# Patient Record
Sex: MALE | Race: OTHER | URBAN - METROPOLITAN AREA
[De-identification: names, ages, dates, MRNs, and addresses within clinical notes are randomized per-mention and may not be internally consistent; named-entity substitution may affect disease eponyms.]

---

## 2018-11-07 ENCOUNTER — INPATIENT (INPATIENT)
Facility: HOSPITAL | Age: 33
LOS: 6 days | Discharge: HOME | DRG: 101 | End: 2018-11-14
Attending: INTERNAL MEDICINE | Admitting: INTERNAL MEDICINE
Payer: MEDICAID

## 2018-11-07 VITALS
WEIGHT: 160.06 LBS | TEMPERATURE: 98 F | DIASTOLIC BLOOD PRESSURE: 79 MMHG | OXYGEN SATURATION: 99 % | HEART RATE: 93 BPM | RESPIRATION RATE: 16 BRPM | SYSTOLIC BLOOD PRESSURE: 123 MMHG | HEIGHT: 69 IN

## 2018-11-07 LAB
ALBUMIN SERPL ELPH-MCNC: 3.4 G/DL — LOW (ref 3.5–5)
ALP SERPL-CCNC: 35 U/L — LOW (ref 40–120)
ALT FLD-CCNC: 32 U/L DA — SIGNIFICANT CHANGE UP (ref 10–60)
ANION GAP SERPL CALC-SCNC: 8 MMOL/L — SIGNIFICANT CHANGE UP (ref 5–17)
AST SERPL-CCNC: 24 U/L — SIGNIFICANT CHANGE UP (ref 10–40)
BASOPHILS # BLD AUTO: 0.1 K/UL — SIGNIFICANT CHANGE UP (ref 0–0.2)
BASOPHILS NFR BLD AUTO: 0.7 % — SIGNIFICANT CHANGE UP (ref 0–2)
BILIRUB SERPL-MCNC: 0.4 MG/DL — SIGNIFICANT CHANGE UP (ref 0.2–1.2)
BUN SERPL-MCNC: 15 MG/DL — SIGNIFICANT CHANGE UP (ref 7–18)
CALCIUM SERPL-MCNC: 8.9 MG/DL — SIGNIFICANT CHANGE UP (ref 8.4–10.5)
CHLORIDE SERPL-SCNC: 104 MMOL/L — SIGNIFICANT CHANGE UP (ref 96–108)
CO2 SERPL-SCNC: 27 MMOL/L — SIGNIFICANT CHANGE UP (ref 22–31)
CREAT SERPL-MCNC: 1.18 MG/DL — SIGNIFICANT CHANGE UP (ref 0.5–1.3)
EOSINOPHIL # BLD AUTO: 0.5 K/UL — SIGNIFICANT CHANGE UP (ref 0–0.5)
EOSINOPHIL NFR BLD AUTO: 5.7 % — SIGNIFICANT CHANGE UP (ref 0–6)
ETHANOL SERPL-MCNC: <3 MG/DL — SIGNIFICANT CHANGE UP (ref 0–10)
GLUCOSE SERPL-MCNC: 90 MG/DL — SIGNIFICANT CHANGE UP (ref 70–99)
HCT VFR BLD CALC: 41.9 % — SIGNIFICANT CHANGE UP (ref 39–50)
HGB BLD-MCNC: 14.2 G/DL — SIGNIFICANT CHANGE UP (ref 13–17)
LYMPHOCYTES # BLD AUTO: 1.6 K/UL — SIGNIFICANT CHANGE UP (ref 1–3.3)
LYMPHOCYTES # BLD AUTO: 17.2 % — SIGNIFICANT CHANGE UP (ref 13–44)
MAGNESIUM SERPL-MCNC: 1.8 MG/DL — SIGNIFICANT CHANGE UP (ref 1.6–2.6)
MCHC RBC-ENTMCNC: 31.8 PG — SIGNIFICANT CHANGE UP (ref 27–34)
MCHC RBC-ENTMCNC: 34 GM/DL — SIGNIFICANT CHANGE UP (ref 32–36)
MCV RBC AUTO: 93.5 FL — SIGNIFICANT CHANGE UP (ref 80–100)
MONOCYTES # BLD AUTO: 0.8 K/UL — SIGNIFICANT CHANGE UP (ref 0–0.9)
MONOCYTES NFR BLD AUTO: 8.2 % — SIGNIFICANT CHANGE UP (ref 2–14)
NEUTROPHILS # BLD AUTO: 6.5 K/UL — SIGNIFICANT CHANGE UP (ref 1.8–7.4)
NEUTROPHILS NFR BLD AUTO: 68.1 % — SIGNIFICANT CHANGE UP (ref 43–77)
PLATELET # BLD AUTO: 226 K/UL — SIGNIFICANT CHANGE UP (ref 150–400)
POTASSIUM SERPL-MCNC: 4.2 MMOL/L — SIGNIFICANT CHANGE UP (ref 3.5–5.3)
POTASSIUM SERPL-SCNC: 4.2 MMOL/L — SIGNIFICANT CHANGE UP (ref 3.5–5.3)
PROT SERPL-MCNC: 7.4 G/DL — SIGNIFICANT CHANGE UP (ref 6–8.3)
RBC # BLD: 4.48 M/UL — SIGNIFICANT CHANGE UP (ref 4.2–5.8)
RBC # FLD: 11.8 % — SIGNIFICANT CHANGE UP (ref 10.3–14.5)
SODIUM SERPL-SCNC: 139 MMOL/L — SIGNIFICANT CHANGE UP (ref 135–145)
WBC # BLD: 9.5 K/UL — SIGNIFICANT CHANGE UP (ref 3.8–10.5)
WBC # FLD AUTO: 9.5 K/UL — SIGNIFICANT CHANGE UP (ref 3.8–10.5)

## 2018-11-07 PROCEDURE — 71045 X-RAY EXAM CHEST 1 VIEW: CPT | Mod: 26

## 2018-11-07 NOTE — ED PROVIDER NOTE - MEDICAL DECISION MAKING DETAILS
33 year old M Pt w/ seizure, unknown history as Pt is not providing history, seizure protocol, will check labs, CAT scan, and reassess

## 2018-11-07 NOTE — ED PROVIDER NOTE - PROGRESS NOTE DETAILS
Pt not speaking, not giving any verbal information on multiple reevaluations, but is breathing and not in any distress. When attempted to get CT, pt became extremely hostile, agitated and angry. Pt was screaming and getting up in a threatening posture, yelling and spitting. We tried multiple times to reason with him, offering food, etc and pt seemed to initially agree, but then started yelling and cursing. Pt received 4mg Versed for agitation and aggression. CT scan was performed. Will admit pt for possibly prolonged post-ictal stage. Did not give pt Haldol because of lower seizure threshold. Scribe Statement (Attending): I Tabitha Mai (Scibe) attest that this documentation has been prepared under the direction and in the presence of Christianne Clayton). Pt not speaking, not giving any verbal information on multiple reevaluations, but is breathing and not in any distress. When attempted to get CT, pt became extremely hostile, agitated and angry. Pt was screaming and getting up in a threatening posture, yelling and spitting. We tried multiple times to reason with him, offering food, etc and pt seemed to initially agree, but then started yelling and cursing sitting up from CT table and swinging his arms in aggressive manner. The staff was forced to leave the CT scan room due to risk of bodily harm.  Pt received 4mg Versed for agitation and aggression with me and multiple nurses and PCAs and security personel present.  CT scan was performed to rule out ICH. Will admit pt for altered mental status prolonged post-ictal stage. Did not give pt Haldol because it might lower seizure threshold. AMS possibly due to postictal state vs psych vs tox. after CT patient was undressed, placed on monitor placed on 1:1 for his own and staff safety. once stabilized, patient will benefit from psych evaluation while inpatient.

## 2018-11-07 NOTE — ED PROVIDER NOTE - OBJECTIVE STATEMENT
33 year old M Pt w/ PMHx of Seizures [place] x today. Pt is sleeping but arousal-able. History limited. 33 year old M Pt w/ PMHx of Seizures [place] x today. Pt is sleeping but arousal-able. History limited, patient not speaking at all. according to EMS report, [place] and have [place]. HE had a seizure 10 days prior, denies N/V

## 2018-11-07 NOTE — ED ADULT NURSE NOTE - ED STAT RN HANDOFF DETAILS 2
Received pt in bed Aox3, transported pt to room,. No nursing intervention required by documenting RN.

## 2018-11-07 NOTE — ED ADULT NURSE NOTE - OBJECTIVE STATEMENT
Pt [place] seizures [place]. Pt has a laceration on his left eyebrow.

## 2018-11-07 NOTE — ED ADULT NURSE NOTE - NSIMPLEMENTINTERV_GEN_ALL_ED
Implemented All Fall with Harm Risk Interventions:  [place] to call system. Call bell, personal items and telephone within reach. Instruct patient to call for assistance. Room bathroom lighting operational. Non-slip footwear when patient is off stretcher. Physically safe environment: no spills, clutter or unnecessary equipment. Stretcher in lowest position, wheels locked, appropriate side rails in place. Provide visual cue, wrist band, yellow gown, etc. Monitor gait and stability. Monitor for mental status changes and reorient to person, place, and time. Review medications for side effects contributing to fall risk. Reinforce activity limits and safety measures with patient and family. Provide visual clues: red socks.

## 2018-11-08 DIAGNOSIS — F19.10 OTHER PSYCHOACTIVE SUBSTANCE ABUSE, UNCOMPLICATED: ICD-10-CM

## 2018-11-08 DIAGNOSIS — Z29.9 ENCOUNTER FOR PROPHYLACTIC MEASURES, UNSPECIFIED: ICD-10-CM

## 2018-11-08 DIAGNOSIS — R41.82 ALTERED MENTAL STATUS, UNSPECIFIED: ICD-10-CM

## 2018-11-08 DIAGNOSIS — G93.40 ENCEPHALOPATHY, UNSPECIFIED: ICD-10-CM

## 2018-11-08 LAB
ALBUMIN SERPL ELPH-MCNC: 3.3 G/DL — LOW (ref 3.5–5)
ALP SERPL-CCNC: 34 U/L — LOW (ref 40–120)
ALT FLD-CCNC: 30 U/L DA — SIGNIFICANT CHANGE UP (ref 10–60)
ANION GAP SERPL CALC-SCNC: 7 MMOL/L — SIGNIFICANT CHANGE UP (ref 5–17)
APPEARANCE UR: CLEAR — SIGNIFICANT CHANGE UP
AST SERPL-CCNC: 22 U/L — SIGNIFICANT CHANGE UP (ref 10–40)
BILIRUB DIRECT SERPL-MCNC: 0.2 MG/DL — SIGNIFICANT CHANGE UP (ref 0–0.2)
BILIRUB INDIRECT FLD-MCNC: 0.3 MG/DL — SIGNIFICANT CHANGE UP (ref 0.2–1)
BILIRUB SERPL-MCNC: 0.5 MG/DL — SIGNIFICANT CHANGE UP (ref 0.2–1.2)
BILIRUB UR-MCNC: NEGATIVE — SIGNIFICANT CHANGE UP
BUN SERPL-MCNC: 15 MG/DL — SIGNIFICANT CHANGE UP (ref 7–18)
CALCIUM SERPL-MCNC: 8.9 MG/DL — SIGNIFICANT CHANGE UP (ref 8.4–10.5)
CHLORIDE SERPL-SCNC: 103 MMOL/L — SIGNIFICANT CHANGE UP (ref 96–108)
CO2 SERPL-SCNC: 31 MMOL/L — SIGNIFICANT CHANGE UP (ref 22–31)
COLOR SPEC: YELLOW — SIGNIFICANT CHANGE UP
CREAT SERPL-MCNC: 1.2 MG/DL — SIGNIFICANT CHANGE UP (ref 0.5–1.3)
DIFF PNL FLD: NEGATIVE — SIGNIFICANT CHANGE UP
GLUCOSE SERPL-MCNC: 74 MG/DL — SIGNIFICANT CHANGE UP (ref 70–99)
GLUCOSE UR QL: NEGATIVE — SIGNIFICANT CHANGE UP
KETONES UR-MCNC: NEGATIVE — SIGNIFICANT CHANGE UP
LEUKOCYTE ESTERASE UR-ACNC: NEGATIVE — SIGNIFICANT CHANGE UP
MAGNESIUM SERPL-MCNC: 1.8 MG/DL — SIGNIFICANT CHANGE UP (ref 1.6–2.6)
NITRITE UR-MCNC: NEGATIVE — SIGNIFICANT CHANGE UP
PCP SPEC-MCNC: SIGNIFICANT CHANGE UP
PH UR: 7 — SIGNIFICANT CHANGE UP (ref 5–8)
PHOSPHATE SERPL-MCNC: 4 MG/DL — SIGNIFICANT CHANGE UP (ref 2.5–4.5)
POTASSIUM SERPL-MCNC: 4.1 MMOL/L — SIGNIFICANT CHANGE UP (ref 3.5–5.3)
POTASSIUM SERPL-SCNC: 4.1 MMOL/L — SIGNIFICANT CHANGE UP (ref 3.5–5.3)
PROT SERPL-MCNC: 7.3 G/DL — SIGNIFICANT CHANGE UP (ref 6–8.3)
PROT UR-MCNC: 30 MG/DL
SODIUM SERPL-SCNC: 141 MMOL/L — SIGNIFICANT CHANGE UP (ref 135–145)
SP GR SPEC: 1.01 — SIGNIFICANT CHANGE UP (ref 1.01–1.02)
UROBILINOGEN FLD QL: 1

## 2018-11-08 PROCEDURE — 99254 IP/OBS CNSLTJ NEW/EST MOD 60: CPT

## 2018-11-08 PROCEDURE — 95819 EEG AWAKE AND ASLEEP: CPT | Mod: 26

## 2018-11-08 PROCEDURE — 93010 ELECTROCARDIOGRAM REPORT: CPT

## 2018-11-08 PROCEDURE — 99222 1ST HOSP IP/OBS MODERATE 55: CPT

## 2018-11-08 PROCEDURE — 99285 EMERGENCY DEPT VISIT HI MDM: CPT

## 2018-11-08 PROCEDURE — 70450 CT HEAD/BRAIN W/O DYE: CPT | Mod: 26

## 2018-11-08 RX ORDER — MIDAZOLAM HYDROCHLORIDE 1 MG/ML
4 INJECTION, SOLUTION INTRAMUSCULAR; INTRAVENOUS ONCE
Qty: 0 | Refills: 0 | Status: DISCONTINUED | OUTPATIENT
Start: 2018-11-08 | End: 2018-11-08

## 2018-11-08 RX ORDER — SODIUM CHLORIDE 9 MG/ML
1000 INJECTION, SOLUTION INTRAVENOUS
Qty: 0 | Refills: 0 | Status: DISCONTINUED | OUTPATIENT
Start: 2018-11-08 | End: 2018-11-12

## 2018-11-08 RX ORDER — LEVETIRACETAM 250 MG/1
1000 TABLET, FILM COATED ORAL ONCE
Qty: 0 | Refills: 0 | Status: COMPLETED | OUTPATIENT
Start: 2018-11-08 | End: 2018-11-08

## 2018-11-08 RX ORDER — THIAMINE MONONITRATE (VIT B1) 100 MG
100 TABLET ORAL DAILY
Qty: 0 | Refills: 0 | Status: COMPLETED | OUTPATIENT
Start: 2018-11-08 | End: 2018-11-12

## 2018-11-08 RX ORDER — POTASSIUM CHLORIDE 20 MEQ
40 PACKET (EA) ORAL EVERY 4 HOURS
Qty: 0 | Refills: 0 | Status: COMPLETED | OUTPATIENT
Start: 2018-11-08 | End: 2018-11-08

## 2018-11-08 RX ORDER — HEPARIN SODIUM 5000 [USP'U]/ML
5000 INJECTION INTRAVENOUS; SUBCUTANEOUS EVERY 8 HOURS
Qty: 0 | Refills: 0 | Status: DISCONTINUED | OUTPATIENT
Start: 2018-11-08 | End: 2018-11-14

## 2018-11-08 RX ORDER — FOLIC ACID 0.8 MG
1 TABLET ORAL DAILY
Qty: 0 | Refills: 0 | Status: DISCONTINUED | OUTPATIENT
Start: 2018-11-08 | End: 2018-11-14

## 2018-11-08 RX ORDER — SODIUM CHLORIDE 9 MG/ML
1000 INJECTION INTRAMUSCULAR; INTRAVENOUS; SUBCUTANEOUS ONCE
Qty: 0 | Refills: 0 | Status: COMPLETED | OUTPATIENT
Start: 2018-11-08 | End: 2018-11-08

## 2018-11-08 RX ORDER — LEVETIRACETAM 250 MG/1
1000 TABLET, FILM COATED ORAL EVERY 12 HOURS
Qty: 0 | Refills: 0 | Status: DISCONTINUED | OUTPATIENT
Start: 2018-11-08 | End: 2018-11-14

## 2018-11-08 RX ADMIN — LEVETIRACETAM 400 MILLIGRAM(S): 250 TABLET, FILM COATED ORAL at 02:00

## 2018-11-08 RX ADMIN — HEPARIN SODIUM 5000 UNIT(S): 5000 INJECTION INTRAVENOUS; SUBCUTANEOUS at 21:50

## 2018-11-08 RX ADMIN — Medication 100 MILLIGRAM(S): at 11:46

## 2018-11-08 RX ADMIN — LEVETIRACETAM 400 MILLIGRAM(S): 250 TABLET, FILM COATED ORAL at 17:18

## 2018-11-08 RX ADMIN — SODIUM CHLORIDE 100 MILLILITER(S): 9 INJECTION, SOLUTION INTRAVENOUS at 06:29

## 2018-11-08 RX ADMIN — MIDAZOLAM HYDROCHLORIDE 4 MILLIGRAM(S): 1 INJECTION, SOLUTION INTRAMUSCULAR; INTRAVENOUS at 02:00

## 2018-11-08 RX ADMIN — HEPARIN SODIUM 5000 UNIT(S): 5000 INJECTION INTRAVENOUS; SUBCUTANEOUS at 06:29

## 2018-11-08 RX ADMIN — SODIUM CHLORIDE 1000 MILLILITER(S): 9 INJECTION INTRAMUSCULAR; INTRAVENOUS; SUBCUTANEOUS at 02:30

## 2018-11-08 RX ADMIN — SODIUM CHLORIDE 100 MILLILITER(S): 9 INJECTION, SOLUTION INTRAVENOUS at 09:18

## 2018-11-08 NOTE — H&P ADULT - PROBLEM SELECTOR PLAN 1
-Patient brought in to ED after witnessed seizure/syncope by bystander  -Aggressive and combative  -U tox+ve for cannabis. BAL -ve, however seizures can still be a possibility 2/2 to Alcohol withdrawl  -Will start on CIWA protocol  -Ativan 2 mg Q4 prn  -seizure precautions  -fall precautions  -Neurology consult -Patient brought in to ED after witnessed seizure/syncope by bystander  -Aggressive and combative  -U tox+ve for cannabis. BAL -ve, however seizures can still be a possibility 2/2 to Alcohol withdrawl  -Will start on CIWA protocol  -Ativan 2 mg Q4 prn  -Banana bag, IV thiamine, folic Acid   -seizure precautions  -fall precautions  -received Kejuan luisra in Ed. Will hold on Anti-epileptics for now  -Might need an EEG. If neurological cause of AMS ruled out and patient still combative, might need psych evaluation.   -Neurology consult

## 2018-11-08 NOTE — H&P ADULT - HISTORY OF PRESENT ILLNESS
Patient is a 34 y/o M unknown PMHx was brought in by EMS after a witnessed seizure by a bystander. No description of seizure activity available. Patient remained post ictal for 5 mins as per EMS note. EMS reported patient as AAO x 3. Patient revealed he has history of seizure and had a seizure 10 days ago. In Ed patient was unresponsive. As per Ed provider patient initially unresponsive later became very aggressive and combative during CT scan, requiring extra staff to calm him down and received versed . On Resident evaluation patient was initially sleeping and woke up after 5 mins he tried to walk out of bed, patient is awake and alert but doesn't answer questions. He was eagerly eating food provided to him. ROS are unable to obtain. In Ed patient's vitals were stable. CT head showed Soft tissue swelling in the  left periorbital region and overlying the left forehead. Fracture deformities of both nasal bones, the left maxillary sinus, lateral wall the left orbit and left zygomatic arch which are probably chronic. urine tox is positive for Cannabis. BAL is <3. un known if uses Alcohol However; Alcohol with drawl seizures can occur 48 hours post Alcohol use.

## 2018-11-08 NOTE — CONSULT NOTE ADULT - SUBJECTIVE AND OBJECTIVE BOX
Patient is a 33y old  Male who presents with a chief complaint of encephalopathy/seizure (08 Nov 2018 03:56)      HPI:  Homeless; unknown history; urine positive for   PAST MEDICAL & SURGICAL HISTORY:  Seizures  No significant past surgical history      FAMILY HISTORY:  No pertinent family history in first degree relatives        Social Hx:  Nonsmoker, no drug or alcohol use    MEDICATIONS  (STANDING):  folic acid 1 milliGRAM(s) Oral daily  heparin  Injectable 5000 Unit(s) SubCutaneous every 8 hours  levETIRAcetam  IVPB 1000 milliGRAM(s) IV Intermittent every 12 hours  multivitamin/thiamine/folic acid in sodium chloride 0.9% 1000 milliLiter(s) (100 mL/Hr) IV Continuous <Continuous>  thiamine Injectable 100 milliGRAM(s) IV Push daily       Allergies    No Known Allergies    Intolerances        ROS: Pertinent positives in HPI, all other ROS were reviewed and are negative.      Vital Signs Last 24 Hrs  T(C): 36.4 (08 Nov 2018 08:55), Max: 37.1 (08 Nov 2018 07:15)  T(F): 97.6 (08 Nov 2018 08:55), Max: 98.7 (08 Nov 2018 07:15)  HR: 70 (08 Nov 2018 08:55) (60 - 93)  BP: 102/65 (08 Nov 2018 08:55) (100/60 - 123/79)  BP(mean): --  RR: 18 (08 Nov 2018 08:55) (15 - 18)  SpO2: 100% (08 Nov 2018 08:55) (97% - 100%)        Constitutional: awake and alert.  HEENT: PERRLA, EOMI,   Neck: Supple.  Respiratory: Breath sounds are clear bilaterally  Cardiovascular: S1 and S2, regular / irregular rhythm  Gastrointestinal: soft, nontender  Extremities:  no edema  Vascular: Caritid Bruit - no  Musculoskeletal: no joint swelling/tenderness, no abnormal movements  Skin: No rashes    Neurological exam:  HF: Stuporous resists efforts; CHRISTAL; OS conjunctival and eyelid orbital swelling forehead abrasion. EOM full. face symmetrical. Moves alll 4 extremities . DTR symmetrical; plantar flexor. neck supple  CCoord:  Not tested  Gait/Balance: Cannot test        Labs:                        14.2   9.5   )-----------( 226      ( 07 Nov 2018 21:13 )             41.9     11-08    141  |  103  |  15  ----------------------------<  74  4.1   |  31  |  1.20    Ca    8.9      08 Nov 2018 05:46  Phos  4.0     11-08  Mg     1.8     11-08    TPro  7.3  /  Alb  3.3<L>  /  TBili  0.5  /  DBili  0.2  /  AST  22  /  ALT  30  /  AlkPhos  34<L>  11-08            Radiology report:  - CT head:    < from: CT Head No Cont (11.08.18 @ 01:32) >  EXAM:  CT BRAIN                            PROCEDURE DATE:  11/08/2018          INTERPRETATION:  CLINICAL INFORMATION: Status post seizure.    TECHNIQUE:  Axial CT images were acquired through the head.  Intravenous contrast: None  Two-dimensional reformats were generated.    COMPARISON STUDY: None    FINDINGS:   There is no CT evidence of acute intracranial hemorrhage,  mass effect,   midline shift or acute territorial infarct. The basal cisterns are patent.    The ventricles and sulci are normal in size.      There is mild mucosal thickening in the right maxillary sinus.    The mastoid air cells and middle ear cavities are clear.    There is soft tissue swelling in the left periorbital region and   overlying the left forehead.  There is no calvarial or skull base   fracture.    There are fracture deformities in both nasal bones, in the posterior   lateral wall the left maxillary sinus, in the lateral wall the left orbit   and in the left zygomatic arch without overlying soft tissue swelling or   soft tissue gas which are probably chronic.    IMPRESSION:   Soft tissue swelling in the  left periorbital region and overlying the   left forehead.  Nno CT evidence of acute intracranial hemorrhage, mass   effect, acute territorial infarct or calvarial fracture.  Follow-up MRI   can be obtained as clinically warranted to assess for seizure focus.    Fracture deformities of both nasal bones, the left maxillary sinus,   lateral wall the left orbit and left zygomatic arch which are probably   chronic.                MICHAEL MEYERS M.D.,ATTENDING RADIOLOGIST  This document has been electronically signed. Nov 8 2018  2:24AM

## 2018-11-08 NOTE — CONSULT NOTE ADULT - ASSESSMENT
Stupor: Unlikely encephalitis/meningitis; SAH based on the physical examination and CT head; Withdrawal, post ictal encepahalopathy, head injury or other pathology may need to an investigated. Plan EEG, repeat CT or MRI

## 2018-11-08 NOTE — EEG REPORT - NS EEG TEXT BOX
[facility]  Report of Routine EEG with Video      [facility]: [address], [address] [place] [address], Phone: [phone]  [facility]: [address] [place] [address], Phone: [phone]  Office: [address] [facility], [place] [address], Phone: [phone]    Patient Name: VAN WOMACK    Age: 33 year  : 1985  Patient ID: -, MRN #: -, Location: -  Referring Physician: DR LLANOS  EEG #:     Study Date: 2018		    Technical Information:					  On Instrument: -  Placement and Labeling of Electrodes:  The EEG was performed utilizing 20 channels referential EEG connections (coronal over temporal over parasagittal montage) using all standard 10-20 electrode placements with EKG.  Recording was at a sampling rate of 256 samples per second per channel.  Time synchronized digital video recording was done simultaneously with EEG recording.  A low light infrared camera was used for low light recording.  Ramon and seizure detection algorithms were utilized.    History:  -33 years old evaluated for seizures  -    Medication	  Home Medications: Depakote:  (2018 02:23) 	    Study Interpretation:    FINDINGS:  The background was continuous, spontaneously variable and reactive.   During wakefulness, poorly organized 7 Hz broadly distributed posterior dominant rhythm.     Background Slowing:  Background predominantly consisted of theta, delta and faster activities, superimposed high amplitude 2-2.5 Hz activity and diffuse 4 Hz activity.    Focal Slowing:   None were present.    Sleep Background:  Stage II sleep transients were not recorded.    Other Non-Epileptiform Findings:  None were present.    Interictal Epileptiform Activity:   None were present.    Events:  Clinical events: None recorded.  Seizures: None recorded.    Activation Procedures:   Hyperventilation was not performed.    Photic stimulation was performed and did not elicit any abnormalities.      Artifacts:  Intermittent myogenic and movement artifacts were noted.    ECG:  The heart rate on single channel ECG was predominantly between 60-70 BPM.      EEG Summary/Classification:   Abnormal EEG in the awake / drowsy state(s).  Highly rhythmic frontally predominant background slowing without evolution.    EEG Impression/Clinical Correlate:  Abnormal EEG study.  Moderate nonspecific diffuse or multifocal cerebral dysfunction.   Highly rhythmic frontally predominant background slowing without evolution.  Recommend repeat interval study.      Melyssa Mehta MD  Epilepsy Fellow, [facility] [place]      ________________________	  Germain Loo M.D.  Attending Physician, [facility]  Report of Routine EEG with Video      [facility]: [address], [address] [place], [place] [address], Phone: [phone]  [facility]: [address] [place] [address], Phone: [phone]  Office: 1 [facility], [address], [place] [address], Phone: [phone]    Patient Name: VAN WOMACK    Age: 33 year  : 1985  Patient ID: -, MRN #: -, Location: -  Referring Physician: DR LLANOS  EEG #:     Study Date: 2018		    Technical Information:					  On Instrument: -  Placement and Labeling of Electrodes:  The EEG was performed utilizing 20 channels referential EEG connections (coronal over temporal over parasagittal montage) using all standard 10-20 electrode placements with EKG.  Recording was at a sampling rate of 256 samples per second per channel.  Time synchronized digital video recording was done simultaneously with EEG recording.  A low light infrared camera was used for low light recording.  Ramon and seizure detection algorithms were utilized.    History:  -33 years old evaluated for seizures  -    Medication	  Home Medications: Depakote:  (2018 02:23) 	    Study Interpretation:    FINDINGS:  The background was continuous, spontaneously variable and reactive.   During wakefulness, poorly organized 7 Hz broadly distributed posterior dominant rhythm.     Background Slowing:  Background predominantly consisted of theta, delta and faster activities, superimposed high amplitude 2-2.5 Hz activity and diffuse 4 Hz activity.    Focal Slowing:   None were present.    Sleep Background:  Stage II sleep transients were not recorded.    Other Non-Epileptiform Findings:  None were present.    Interictal Epileptiform Activity:   None were present.    Events:  Clinical events: None recorded.  Seizures: None recorded.    Activation Procedures:   Hyperventilation was not performed.    Photic stimulation was performed and did not elicit any abnormalities.      Artifacts:  Intermittent myogenic and movement artifacts were noted.    ECG:  The heart rate on single channel ECG was predominantly between 60-70 BPM.      EEG Summary/Classification:  Abnormal EEG in the awake / drowsy state(s).  Atypical looking highly rhythmic bifrontally predominant generalized/multifocal background slowing without evolution.    EEG Impression/Clinical Correlate:  Abnormal EEG study.  Moderate diffuse or multifocal cerebral dysfunction. Atypical looking EEG, due to fairly rhythmic component, recommend repeat interval study if symptoms not improving.    Melyssa Mehta MD  Epilepsy Fellow, [facility]      ________________________	  Germain Loo M.D.  Attending Physician, [facility]

## 2018-11-08 NOTE — H&P ADULT - PROBLEM SELECTOR PLAN 3
RISK                                                          Points  [] Previous VTE                                           3  [] Thrombophilia                                        2  [] Lower limb paralysis                              2   [] Current Cancer                                       2   [x] Immobilization > 24 hrs                        1  [] ICU/CCU stay > 24 hours                       1  [] Age > 60                                                   1    DVT prophylaxis with heparin sc

## 2018-11-08 NOTE — H&P ADULT - ASSESSMENT
Patient is a 34 y/o M unknown PMHx was brought in by EMS after a witnessed seizure by a bystander. No description of seizure activity available. Patient remained post ictal for 5 mins as per EMS note. EMS reported patient as AAO x 3. Patient revealed he has history of seizure and had a seizure 10 days ago. In Ed patient was unresponsive. As per Ed provider patient initially unresponsive later became very aggressive and combative during CT scan, requiring extra staff to calm him down and received versed.

## 2018-11-08 NOTE — H&P ADULT - NSHPPHYSICALEXAM_GEN_ALL_CORE
________________________________________________  PHYSICAL EXAM:  GENERAL: NAD, poorly groomed   HEENT: Normocephalic; left raccoon eye, conjunctivae and sclerae clear; dry mucous membranes; left forehead abrasion currently healing   NECK : supple  CHEST/LUNG: Clear to auscultation bilaterally with good air entry   HEART: S1 S2  regular; no murmurs, gallops or rubs  ABDOMEN: Soft, Nontender, Nondistended; Bowel sounds present  EXTREMITIES: no cyanosis; no edema; no calf tenderness  SKIN: warm and dry; no rash  NERVOUS SYSTEM:  Awake and alert;

## 2018-11-08 NOTE — PATIENT PROFILE ADULT - NSPROCHRONICPAIN_GEN_A_NUR
Pt rescheduled for 1:30 injection appt today.  Spoke with Dr. Rios who gives VO for pt to continue on Invega Sustenna 234 mg Q 4 weeks.  Ok to give injection today.  With regard to provider appt, will text Dr. Rios if pt should be seen today or if reports sx are stable, okay to wait until next opening.     unable to assess; pt doesn't want to respond

## 2018-11-08 NOTE — ED ADULT NURSE REASSESSMENT NOTE - NS ED NURSE REASSESS COMMENT FT1
Pt sleeping but arousable. pt on 1:1 direct observation, mediated per order. Pt given food, pt passed urine, urine sent to lab. pt not in distress.

## 2018-11-09 LAB
ANION GAP SERPL CALC-SCNC: 7 MMOL/L — SIGNIFICANT CHANGE UP (ref 5–17)
BUN SERPL-MCNC: 19 MG/DL — HIGH (ref 7–18)
CALCIUM SERPL-MCNC: 9.3 MG/DL — SIGNIFICANT CHANGE UP (ref 8.4–10.5)
CHLORIDE SERPL-SCNC: 104 MMOL/L — SIGNIFICANT CHANGE UP (ref 96–108)
CO2 SERPL-SCNC: 25 MMOL/L — SIGNIFICANT CHANGE UP (ref 22–31)
CREAT SERPL-MCNC: 1.12 MG/DL — SIGNIFICANT CHANGE UP (ref 0.5–1.3)
GLUCOSE SERPL-MCNC: 92 MG/DL — SIGNIFICANT CHANGE UP (ref 70–99)
HCT VFR BLD CALC: 43.8 % — SIGNIFICANT CHANGE UP (ref 39–50)
HGB BLD-MCNC: 14.6 G/DL — SIGNIFICANT CHANGE UP (ref 13–17)
MAGNESIUM SERPL-MCNC: 2 MG/DL — SIGNIFICANT CHANGE UP (ref 1.6–2.6)
MCHC RBC-ENTMCNC: 31.5 PG — SIGNIFICANT CHANGE UP (ref 27–34)
MCHC RBC-ENTMCNC: 33.4 GM/DL — SIGNIFICANT CHANGE UP (ref 32–36)
MCV RBC AUTO: 94.4 FL — SIGNIFICANT CHANGE UP (ref 80–100)
PHOSPHATE SERPL-MCNC: 4.2 MG/DL — SIGNIFICANT CHANGE UP (ref 2.5–4.5)
PLATELET # BLD AUTO: 224 K/UL — SIGNIFICANT CHANGE UP (ref 150–400)
POTASSIUM SERPL-MCNC: 3.9 MMOL/L — SIGNIFICANT CHANGE UP (ref 3.5–5.3)
POTASSIUM SERPL-SCNC: 3.9 MMOL/L — SIGNIFICANT CHANGE UP (ref 3.5–5.3)
RBC # BLD: 4.64 M/UL — SIGNIFICANT CHANGE UP (ref 4.2–5.8)
RBC # FLD: 11.4 % — SIGNIFICANT CHANGE UP (ref 10.3–14.5)
SODIUM SERPL-SCNC: 136 MMOL/L — SIGNIFICANT CHANGE UP (ref 135–145)
WBC # BLD: 8.5 K/UL — SIGNIFICANT CHANGE UP (ref 3.8–10.5)
WBC # FLD AUTO: 8.5 K/UL — SIGNIFICANT CHANGE UP (ref 3.8–10.5)

## 2018-11-09 PROCEDURE — 99232 SBSQ HOSP IP/OBS MODERATE 35: CPT

## 2018-11-09 RX ADMIN — HEPARIN SODIUM 5000 UNIT(S): 5000 INJECTION INTRAVENOUS; SUBCUTANEOUS at 21:24

## 2018-11-09 RX ADMIN — LEVETIRACETAM 400 MILLIGRAM(S): 250 TABLET, FILM COATED ORAL at 05:11

## 2018-11-09 RX ADMIN — Medication 100 MILLIGRAM(S): at 12:20

## 2018-11-09 RX ADMIN — LEVETIRACETAM 400 MILLIGRAM(S): 250 TABLET, FILM COATED ORAL at 17:18

## 2018-11-09 RX ADMIN — HEPARIN SODIUM 5000 UNIT(S): 5000 INJECTION INTRAVENOUS; SUBCUTANEOUS at 05:11

## 2018-11-09 RX ADMIN — SODIUM CHLORIDE 100 MILLILITER(S): 9 INJECTION, SOLUTION INTRAVENOUS at 09:12

## 2018-11-09 NOTE — PROGRESS NOTE ADULT - SUBJECTIVE AND OBJECTIVE BOX
PGY1 Note discussed with supervising resident and primary attending.    Patient is a 33y old  Male who presents with a chief complaint of encephalopathy/seizure (2018 10:16)      INTERVAL HPI/OVERNIGHT EVENTS: Neurology recommended MRI after abnormal EEG study. We are doing Xray skull to rule out any metallic object.  is working on placement to shelter since pt is homeless.    MEDICATIONS  (STANDING):  folic acid 1 milliGRAM(s) Oral daily  heparin  Injectable 5000 Unit(s) SubCutaneous every 8 hours  levETIRAcetam  IVPB 1000 milliGRAM(s) IV Intermittent every 12 hours  multivitamin/thiamine/folic acid in sodium chloride 0.9% 1000 milliLiter(s) (100 mL/Hr) IV Continuous <Continuous>  thiamine Injectable 100 milliGRAM(s) IV Push daily    MEDICATIONS  (PRN):  LORazepam   Injectable 2 milliGRAM(s) IntraMuscular every 4 hours PRN CIWA >7      Allergies    No Known Allergies    Intolerances        REVIEW OF SYSTEMS:  CONSTITUTIONAL: No fever, weight loss, or fatigue  RESPIRATORY: No cough, wheezing, chills or hemoptysis; No shortness of breath  CARDIOVASCULAR: No chest pain, palpitations, dizziness, or leg swelling  GASTROINTESTINAL: No abdominal or epigastric pain. No nausea, vomiting, or hematemesis; No diarrhea or constipation. No melena or hematochezia.  NEUROLOGICAL: No headaches, memory loss, loss of strength, numbness, or tremors  SKIN: No itching, burning, rashes, or lesions     Vital Signs Last 24 Hrs  T(C): 36.8 (2018 04:56), Max: 36.8 (2018 04:56)  T(F): 98.2 (2018 04:56), Max: 98.2 (2018 04:56)  HR: 66 (2018 04:56) (61 - 66)  BP: 112/68 (2018 04:56) (112/68 - 115/73)  BP(mean): --  RR: 16 (2018 04:56) (16 - 18)  SpO2: 100% (2018 04:56) (100% - 100%)    PHYSICAL EXAM:  GENERAL: NAD, well-groomed, well-developed  HEAD:  Atraumatic, Normocephalic  EYES: EOMI, PERRLA, conjunctiva and sclera clear  NECK: Supple, No JVD, Normal thyroid  CHEST/LUNG: Clear to percussion bilaterally; No rales, rhonchi, wheezing, or rubs  HEART: Regular rate and rhythm; No murmurs, rubs, or gallops  ABDOMEN: Soft, Nontender, Nondistended; Bowel sounds present  NERVOUS SYSTEM:  Alert & Oriented X3, Good concentration; Motor Strength 5/5 B/L   EXTREMITIES:  2+ Peripheral Pulses, No clubbing, cyanosis, or edema  SKIN;    LABS:                        14.6   8.5   )-----------( 224      ( 2018 07:09 )             43.8         136  |  104  |  19<H>  ----------------------------<  92  3.9   |  25  |  1.12    Ca    9.3      2018 07:09  Phos  4.2       Mg     2.0         TPro  7.3  /  Alb  3.3<L>  /  TBili  0.5  /  DBili  0.2  /  AST  22  /  ALT  30  /  AlkPhos  34<L>  -08      Urinalysis Basic - ( 2018 02:50 )    Color: Yellow / Appearance: Clear / S.010 / pH: x  Gluc: x / Ketone: Negative  / Bili: Negative / Urobili: 1   Blood: x / Protein: 30 mg/dL / Nitrite: Negative   Leuk Esterase: Negative / RBC: 0-2 /HPF / WBC 0-2 /HPF   Sq Epi: x / Non Sq Epi: Few /HPF / Bacteria: Moderate /HPF      CAPILLARY BLOOD GLUCOSE          RADIOLOGY & ADDITIONAL TESTS:    Imaging Personally Reviewed:  [ ] YES  [ ] NO    Consultant(s) Notes Reviewed:  [ ] YES  [ ] NO

## 2018-11-09 NOTE — CHART NOTE - NSCHARTNOTEFT_GEN_A_CORE
11: 19: Per treatment team, patient off CIWA.  LCSW attempted to meet patient for discharge planning.  Patient asleep, not responsive to LCSW.  Patient remains on 1:1 for risk of harm to self/ agitation.

## 2018-11-09 NOTE — PROGRESS NOTE ADULT - ASSESSMENT
Likely withdrawal seizures and encephalopathy; superficial scalp bruises and ecchymoses and sub-conjunctival hemorrhages without visual impairment; social concern - homelessness and alcohol habit; recommend social work consultation

## 2018-11-09 NOTE — PROGRESS NOTE ADULT - SUBJECTIVE AND OBJECTIVE BOX
INTERVAL HPI/OVERNIGHT EVENTS: No recurrence of seizures; quiet cooperative and pleasant    HEALTH ISSUES - PROBLEM Dx:  Prophylactic measure: Prophylactic measure  Substance abuse: Substance abuse  Encephalopathy: Encephalopathy          MEDICATIONS  (STANDING):  folic acid 1 milliGRAM(s) Oral daily  heparin  Injectable 5000 Unit(s) SubCutaneous every 8 hours  levETIRAcetam  IVPB 1000 milliGRAM(s) IV Intermittent every 12 hours  multivitamin/thiamine/folic acid in sodium chloride 0.9% 1000 milliLiter(s) (100 mL/Hr) IV Continuous <Continuous>  thiamine Injectable 100 milliGRAM(s) IV Push daily    MEDICATIONS  (PRN):  LORazepam   Injectable 2 milliGRAM(s) IntraMuscular every 4 hours PRN CIWA >7      Allergies    No Known Allergies    Intolerances        REVIEW OF SYSTEMS    REVIEW OF SYSTEMS:    CONSTITUTIONAL: No weakness, fatigue, malaise, fevers or chills, no weight change, appetite change  EYES: No visual changes; No double vision,  No vertigo, eye pain  Ears: no otalgia, no otorhea, no hearing loss, tinnitus  Nose: no epistaxis, rhinorrhea, post-discharge, sinus pressure  Throat: no throat pain, no oral lesions, tooth pain   NECK: No pain or stiffness  RESPIRATORY: No cough (productive or dry), wheezing, hemoptysis; No shortness of breath, orthnopnea, PND, BROWN, snoring,  CARDIOVASCULAR: No chest pain or palpitations, no leg edema, no claudication    GASTROINTESTINAL: No abdominal or epigastric pain. No nausea, vomiting, or hematemesis; No diarrhea or constipation. No melena or hematochezia.  GENITOURINARY: No dysuria, frequency, urgency or hematuria, no pelvic pain, urinary incontinence, urgency  Muscloskeletal: no joints or muscle pain, no swelling in joints or muscles  NEUROLOGICAL: No numbness or weakness, headache, memory loss, seizures, dizziness, vertigo, syncope, ataxia  SKIN: No pruritis, rashes, lesions or new moles  Psych: No anxiety, sadness, insomnia, suicide thoughts  Endocrine: No Heat or Cold intolerance, polydipsia, polyphagia  Heme/Lymph: no LN enlargement, no easy bruising or bleeding         Vital Signs Last 24 Hrs  T(C): 36.8 (2018 04:56), Max: 36.8 (2018 04:56)  T(F): 98.2 (2018 04:56), Max: 98.2 (2018 04:56)  HR: 66 (2018 04:56) (61 - 66)  BP: 112/68 (2018 04:56) (112/68 - 115/73)  BP(mean): --  RR: 16 (2018 04:56) (16 - 18)  SpO2: 100% (2018 04:56) (100% - 100%)    PHYSICAL EXAM:    Alert, awake, oriented in time, place, and person with normal immediate and 5 minute recall, fluent speech with normal naming, repetition and comprehension, Pupils are equal and reactive to light and accomodation. Visual fields are full by confrontation testing. Funduscopy reveals normal discs and retina. Extraocular movements are normal without nystagmus. Facial sensations, jaw strength, facial movements, hearing, palate, neck, shoulder and tongue are normal and symmetrical. Sensation for touch, pin, temperature and vibration, position sense are normal and symmetrical in the extremities and /or the trunk. Tone is normal in the extremities. Strength is 5/5. Muscle spindle reflexes (DTR) are normal. Plantar responses are flexor. Finger-to-nose and heel-to-shin are normal. Romberg sign is normal; Heel-walking and toe-walking are normal. Tandem gait is normal.      LABS:                        14.6   8.5   )-----------( 224      ( 2018 07:09 )             43.8     11    136  |  104  |  19<H>  ----------------------------<  92  3.9   |  25  |  1.12    Ca    9.3      2018 07:09  Phos  4.2     11-  Mg     2.0     -    TPro  7.3  /  Alb  3.3<L>  /  TBili  0.5  /  DBili  0.2  /  AST  22  /  ALT  30  /  AlkPhos  34<L>  11-08      Urinalysis Basic - ( 2018 02:50 )    Color: Yellow / Appearance: Clear / S.010 / pH: x  Gluc: x / Ketone: Negative  / Bili: Negative / Urobili: 1   Blood: x / Protein: 30 mg/dL / Nitrite: Negative   Leuk Esterase: Negative / RBC: 0-2 /HPF / WBC 0-2 /HPF   Sq Epi: x / Non Sq Epi: Few /HPF / Bacteria: Moderate /HPF        RADIOLOGY & ADDITIONAL TESTS:  < from: CT Head No Cont (18 @ 01:32) >      PROCEDURE DATE:  2018          INTERPRETATION:  CLINICAL INFORMATION: Status post seizure.    TECHNIQUE:  Axial CT images were acquired through the head.  Intravenous contrast: None  Two-dimensional reformats were generated.    COMPARISON STUDY: None    FINDINGS:   There is no CT evidence of acute intracranial hemorrhage,  mass effect,   midline shift or acute territorial infarct. The basal cisterns are patent.    The ventricles and sulci are normal in size.      There is mild mucosal thickening in the right maxillary sinus.    The mastoid air cells and middle ear cavities are clear.    There is soft tissue swelling in the left periorbital region and   overlying the left forehead.  There is no calvarial or skull base   fracture.    There are fracture deformities in both nasal bones, in the posterior   lateral wall the left maxillary sinus, in the lateral wall the left orbit   and in the left zygomatic arch without overlying soft tissue swelling or   soft tissue gas which are probably chronic.    IMPRESSION:   Soft tissue swelling in the  left periorbital region and overlying the   left forehead.  Nno CT evidence of acute intracranial hemorrhage, mass   effect, acute territorial infarct or calvarial fracture.  Follow-up MRI   can be obtained as clinically warranted to assess for seizure focus.    Fracture deformities of both nasal bones, the left maxillary sinus,   lateral wall the left orbit and left zygomatic arch which are probably   chronic.        MICHAEL MEYERS M.D.,ATTENDING RADIOLOGIST    EEG  Generalized symmetrical slowing There are no epileptiform discharges: consistent with diffuse encephalopathy

## 2018-11-09 NOTE — PROGRESS NOTE ADULT - ASSESSMENT
Patient is a 32 y/o M unknown PMHx was brought in by EMS after a witnessed seizure by a bystander. No description of seizure activity available. Patient remained post ictal for 5 mins as per EMS note. EMS reported patient as AAO x 3. Patient revealed he has history of seizure and had a seizure 10 days ago. In Ed patient was unresponsive. As per Ed provider patient initially unresponsive later became very aggressive and combative during CT scan, requiring extra staff to calm him down and received versed.

## 2018-11-09 NOTE — PROGRESS NOTE ADULT - PROBLEM SELECTOR PLAN 1
-Patient brought in to ED after witnessed seizure/syncope by bystander  -Aggressive and combative  -U tox+ve for cannabis. BAL -ve, however seizures can still be a possibility 2/2 to Alcohol withdrawl  -on CIWA protocol  -Ativan 2 mg Q4 prn  -Banana bag, IV thiamine, folic Acid day 2  -seizure precautions  -fall precautions  -received Keppra in Ed.   -EEG showed abnormal waveform pattren. Neurology recommended MRI for brain.

## 2018-11-10 RX ADMIN — HEPARIN SODIUM 5000 UNIT(S): 5000 INJECTION INTRAVENOUS; SUBCUTANEOUS at 05:10

## 2018-11-10 RX ADMIN — Medication 100 MILLIGRAM(S): at 12:06

## 2018-11-10 RX ADMIN — LEVETIRACETAM 400 MILLIGRAM(S): 250 TABLET, FILM COATED ORAL at 17:14

## 2018-11-10 RX ADMIN — HEPARIN SODIUM 5000 UNIT(S): 5000 INJECTION INTRAVENOUS; SUBCUTANEOUS at 14:50

## 2018-11-10 RX ADMIN — LEVETIRACETAM 400 MILLIGRAM(S): 250 TABLET, FILM COATED ORAL at 05:09

## 2018-11-10 RX ADMIN — HEPARIN SODIUM 5000 UNIT(S): 5000 INJECTION INTRAVENOUS; SUBCUTANEOUS at 21:21

## 2018-11-10 RX ADMIN — Medication 1 MILLIGRAM(S): at 12:06

## 2018-11-10 NOTE — PROGRESS NOTE ADULT - SUBJECTIVE AND OBJECTIVE BOX
VAN WOMACK  MR# [id number]  33yMale        Patient is a 33y old  Male who presents with a chief complaint of encephalopathy/seizure (09 Nov 2018 15:44)      INTERVAL HPI/OVERNIGHT EVENTS:  Patient seen and examined at bedside. Remains on 1:1, awaiting on MRI of the brain once stable. No notations of chest pain, palpitation, SOB, orthopnea, nausea, vomiting or abdominal pain.    ALLERGIES  No Known Allergies      MEDICATIONS  folic acid 1 milliGRAM(s) Oral daily  heparin  Injectable 5000 Unit(s) SubCutaneous every 8 hours  levETIRAcetam  IVPB 1000 milliGRAM(s) IV Intermittent every 12 hours  LORazepam   Injectable 2 milliGRAM(s) IntraMuscular every 4 hours PRN CIWA >7  multivitamin/thiamine/folic acid in sodium chloride 0.9% 1000 milliLiter(s) IV Continuous <Continuous>  thiamine Injectable 100 milliGRAM(s) IV Push daily              REVIEW OF SYSTEMS:  CONSTITUTIONAL: No fever, weight loss, or fatigue  EYES: No eye pain, visual disturbances, or discharge  ENT:  No difficulty hearing, tinnitus, vertigo; No sinus or throat pain  NECK: No pain or stiffness  RESPIRATORY: No cough, wheezing, chills or hemoptysis; No Shortness of Breath  CARDIOVASCULAR: No chest pain, palpitations, passing out, dizziness, or leg swelling  GASTROINTESTINAL: No abdominal or epigastric pain. No nausea, vomiting, or hematemesis; No diarrhea or constipation. No melena or hematochezia.  GENITOURINARY: No dysuria, frequency, hematuria, or incontinence  NEUROLOGICAL: No headaches, memory loss, loss of strength, numbness, or tremors  SKIN: No itching, burning, rashes, or lesions   LYMPH Nodes: No enlarged glands  ENDOCRINE: No heat or cold intolerance; No hair loss  MUSCULOSKELETAL: No joint pain or swelling; No muscle, back, or extremity pain  PSYCHIATRIC: No depression, anxiety, mood swings, or difficulty sleeping  HEME/LYMPH: No easy bruising, or bleeding gums  ALLERGY AND IMMUNOLOGIC: No hives or eczema	    [ ] All others negative	  [ ] Unable to obtain      T(C): 36.1 (11-10-18 @ 13:59), Max: 36.9 (11-09-18 @ 21:30)  T(F): 97 (11-10-18 @ 13:59), Max: 98.4 (11-09-18 @ 21:30)  HR: 78 (11-10-18 @ 13:59) (58 - 78)  BP: 129/69 (11-10-18 @ 13:59) (93/49 - 131/71)  RR: 15 (11-10-18 @ 13:59) (15 - 18)  SpO2: 99% (11-10-18 @ 13:59) (95% - 99%)  Wt(kg): --    I&O's Summary        PHYSICAL EXAM:  A X O x  HEAD:  Atraumatic, Normocephalic  EYES: EOMI, PERRLA, conjunctiva and sclera clear  NECK: Supple, No JVD, Normal thyroid  Resp: CTAB, No crackles, wheezing,   CVS: Regular rate and rhythm; No discernable murmurs, rubs, or gallops  ABD: Soft, Nontender, Nondistended; Bowel sounds present  EXTREMITIES:  2+ Peripheral Pulses, No edema  LYMPH: No dicernable lymphadenopathy noted  GENERAL: NAD, well-groomed, well-developed      LABS:                        14.6   8.5   )-----------( 224      ( 09 Nov 2018 07:09 )             43.8     11-09    136  |  104  |  19<H>  ----------------------------<  92  3.9   |  25  |  1.12    Ca    9.3      09 Nov 2018 07:09  Phos  4.2     11-09  Mg     2.0     11-09          CAPILLARY BLOOD GLUCOSE          Troponins:  ProBNP:  Lipid Profile:   HgA1c:  TSH:           RADIOLOGY & ADDITIONAL TESTS:    Imaging Personally Reviewed:  [ ] YES  [ ] NO      Consultant(s) Notes Reviewed:  [x ] YES  [ ] NO    Care Discussed with Consultants/Other Providers [ x] YES  [ ] NO          PAST MEDICAL & SURGICAL HISTORY:  Seizures  No significant past surgical history        Altered mental status  No pertinent family history in first degree relatives  Handoff  MEWS Score  Seizures  Altered mental status  Prophylactic measure  Substance abuse  Encephalopathy  No significant past surgical history  No significant past surgical history  No significant past surgical history  A-SEIZURE  Seizures

## 2018-11-11 RX ADMIN — HEPARIN SODIUM 5000 UNIT(S): 5000 INJECTION INTRAVENOUS; SUBCUTANEOUS at 15:05

## 2018-11-11 RX ADMIN — Medication 100 MILLIGRAM(S): at 12:27

## 2018-11-11 RX ADMIN — HEPARIN SODIUM 5000 UNIT(S): 5000 INJECTION INTRAVENOUS; SUBCUTANEOUS at 05:09

## 2018-11-11 RX ADMIN — LEVETIRACETAM 400 MILLIGRAM(S): 250 TABLET, FILM COATED ORAL at 17:39

## 2018-11-11 RX ADMIN — LEVETIRACETAM 400 MILLIGRAM(S): 250 TABLET, FILM COATED ORAL at 05:10

## 2018-11-11 RX ADMIN — HEPARIN SODIUM 5000 UNIT(S): 5000 INJECTION INTRAVENOUS; SUBCUTANEOUS at 21:57

## 2018-11-11 NOTE — PROGRESS NOTE ADULT - SUBJECTIVE AND OBJECTIVE BOX
PGY1 Note discussed with supervising resident and primary attending.    Patient is a 33y old  Male who presents with a chief complaint of encephalopathy/seizure (10 Nov 2018 20:05)      INTERVAL HPI/OVERNIGHT EVENTS:  is working on shelter placement since he is homeless.   MRI brain tomorrow if he is agreeable.    MEDICATIONS  (STANDING):  folic acid 1 milliGRAM(s) Oral daily  heparin  Injectable 5000 Unit(s) SubCutaneous every 8 hours  levETIRAcetam  IVPB 1000 milliGRAM(s) IV Intermittent every 12 hours  multivitamin/thiamine/folic acid in sodium chloride 0.9% 1000 milliLiter(s) (100 mL/Hr) IV Continuous <Continuous>  thiamine Injectable 100 milliGRAM(s) IV Push daily    MEDICATIONS  (PRN):  LORazepam   Injectable 2 milliGRAM(s) IntraMuscular every 4 hours PRN CIWA >7      Allergies    No Known Allergies    Intolerances        REVIEW OF SYSTEMS:  CONSTITUTIONAL: No fever, weight loss, or fatigue  RESPIRATORY: No cough, wheezing, chills or hemoptysis; No shortness of breath  CARDIOVASCULAR: No chest pain, palpitations, dizziness, or leg swelling  GASTROINTESTINAL: No abdominal or epigastric pain. No nausea, vomiting, or hematemesis; No diarrhea or constipation. No melena or hematochezia.  NEUROLOGICAL: No headaches, memory loss, loss of strength, numbness, or tremors  SKIN: No itching, burning, rashes, or lesions     Vital Signs Last 24 Hrs  T(C): 37 (11 Nov 2018 05:03), Max: 37 (11 Nov 2018 05:03)  T(F): 98.6 (11 Nov 2018 05:03), Max: 98.6 (11 Nov 2018 05:03)  HR: 94 (11 Nov 2018 05:03) (67 - 94)  BP: 120/72 (11 Nov 2018 05:03) (117/69 - 120/72)  BP(mean): --  RR: 18 (11 Nov 2018 05:03) (16 - 18)  SpO2: 98% (11 Nov 2018 05:03) (98% - 99%)    PHYSICAL EXAM:  GENERAL: NAD, well-groomed, well-developed  HEAD:  Atraumatic, Normocephalic  EYES: EOMI, PERRLA, conjunctiva and sclera clear  NECK: Supple, No JVD, Normal thyroid  CHEST/LUNG: Clear to percussion bilaterally; No rales, rhonchi, wheezing, or rubs  HEART: Regular rate and rhythm; No murmurs, rubs, or gallops  ABDOMEN: Soft, Nontender, Nondistended; Bowel sounds present  NERVOUS SYSTEM:  Alert & Oriented X3, Good concentration; Motor Strength 5/5 B/L   EXTREMITIES:  2+ Peripheral Pulses, No clubbing, cyanosis, or edema  SKIN;    LABS:              CAPILLARY BLOOD GLUCOSE

## 2018-11-12 ENCOUNTER — TRANSCRIPTION ENCOUNTER (OUTPATIENT)
Age: 33
End: 2018-11-12

## 2018-11-12 PROCEDURE — 70551 MRI BRAIN STEM W/O DYE: CPT | Mod: 26

## 2018-11-12 RX ADMIN — HEPARIN SODIUM 5000 UNIT(S): 5000 INJECTION INTRAVENOUS; SUBCUTANEOUS at 05:28

## 2018-11-12 RX ADMIN — Medication 1 MILLIGRAM(S): at 12:16

## 2018-11-12 RX ADMIN — Medication 1 MILLIGRAM(S): at 11:46

## 2018-11-12 RX ADMIN — LEVETIRACETAM 400 MILLIGRAM(S): 250 TABLET, FILM COATED ORAL at 17:06

## 2018-11-12 RX ADMIN — Medication 100 MILLIGRAM(S): at 11:46

## 2018-11-12 RX ADMIN — LEVETIRACETAM 400 MILLIGRAM(S): 250 TABLET, FILM COATED ORAL at 05:28

## 2018-11-12 RX ADMIN — HEPARIN SODIUM 5000 UNIT(S): 5000 INJECTION INTRAVENOUS; SUBCUTANEOUS at 13:42

## 2018-11-12 NOTE — PROGRESS NOTE ADULT - ATTENDING COMMENTS
Agreed and concurred on the above.    D/C planning for a.m. pending MRI brain.
Agreed and concurred on the above.    D/C planning for a.m. pending MRI brain.

## 2018-11-12 NOTE — PROGRESS NOTE ADULT - PROBLEM SELECTOR PLAN 1
-Patient brought in to ED after witnessed seizure/syncope by bystander  -Aggressive and combative  -U tox+ve for cannabis. BAL -ve, however seizures can still be a possibility 2/2 to Alcohol withdrawl  -on [facility] protocol  -Ativan 2 mg Q4 prn  -Banana bag, IV thiamine, folic Acid day 2  -seizure precautions  -fall precautions  -received Keppra in Ed.   -EEG showed abnormal waveform pattren. Neurology recommended MRI for brain.  -Off 1:1 observation, no further episodes of agitation, cooperative. CIWA scoring low, declining etoh rehab on d/c. Awaiting MRI brain prior to d/c planning to shelter, likely today.

## 2018-11-12 NOTE — PROGRESS NOTE ADULT - SUBJECTIVE AND OBJECTIVE BOX
VAN WOMACK  MR# [id number]  33yMale        Patient is a 33y old  Male who presents with a chief complaint of encephalopathy/seizure (11 Nov 2018 14:18)      INTERVAL HPI/OVERNIGHT EVENTS:  Patient seen and examined at bedside. Off 1:1 observation, no further episodes of agitation, cooperative. CIWA scoring low, declining etoh rehab on d/c. Awaiting MRI brain prior to d/c planning to shelter, likely today. No notations of chest pain, palpitation, SOB, orthopnea, nausea, vomiting or abdominal pain.    ALLERGIES  No Known Allergies      MEDICATIONS  folic acid 1 milliGRAM(s) Oral daily  heparin  Injectable 5000 Unit(s) SubCutaneous every 8 hours  levETIRAcetam  IVPB 1000 milliGRAM(s) IV Intermittent every 12 hours  LORazepam   Injectable 2 milliGRAM(s) IntraMuscular every 4 hours PRN CIWA >7  thiamine Injectable 100 milliGRAM(s) IV Push daily              REVIEW OF SYSTEMS:  CONSTITUTIONAL: No fever, weight loss, or fatigue  EYES: No eye pain, visual disturbances, or discharge  ENT:  No difficulty hearing, tinnitus, vertigo; No sinus or throat pain  NECK: No pain or stiffness  RESPIRATORY: No cough, wheezing, chills or hemoptysis; No Shortness of Breath  CARDIOVASCULAR: No chest pain, palpitations, passing out, dizziness, or leg swelling  GASTROINTESTINAL: No abdominal or epigastric pain. No nausea, vomiting, or hematemesis; No diarrhea or constipation. No melena or hematochezia.  GENITOURINARY: No dysuria, frequency, hematuria, or incontinence  NEUROLOGICAL: No headaches, memory loss, loss of strength, numbness, or tremors  SKIN: No itching, burning, rashes, or lesions   LYMPH Nodes: No enlarged glands  ENDOCRINE: No heat or cold intolerance; No hair loss  MUSCULOSKELETAL: No joint pain or swelling; No muscle, back, or extremity pain  PSYCHIATRIC: No depression, anxiety, mood swings, or difficulty sleeping  HEME/LYMPH: No easy bruising, or bleeding gums  ALLERGY AND IMMUNOLOGIC: No hives or eczema	    [ ] All others negative	  [ ] Unable to obtain      T(C): 36.7 (11-12-18 @ 05:14), Max: 36.7 (11-12-18 @ 05:14)  T(F): 98 (11-12-18 @ 05:14), Max: 98 (11-12-18 @ 05:14)  HR: 69 (11-12-18 @ 05:14) (56 - 69)  BP: 146/76 (11-12-18 @ 05:14) (112/67 - 146/76)  RR: 16 (11-12-18 @ 05:14) (16 - 18)  SpO2: 100% (11-12-18 @ 05:14) (99% - 100%)  Wt(kg): --    I&O's Summary        PHYSICAL EXAM:  A X O x  HEAD:  Atraumatic, Normocephalic  EYES: EOMI, PERRLA, conjunctiva and sclera clear  NECK: Supple, No JVD, Normal thyroid  Resp: CTAB, No crackles, wheezing,   CVS: Regular rate and rhythm; No discernable murmurs, rubs, or gallops  ABD: Soft, Nontender, Nondistended; Bowel sounds present  EXTREMITIES:  2+ Peripheral Pulses, No edema  LYMPH: No dicernable lymphadenopathy noted  GENERAL: NAD, well-groomed, well-developed      LABS:              CAPILLARY BLOOD GLUCOSE          Troponins:  ProBNP:  Lipid Profile:   HgA1c:  TSH:           RADIOLOGY & ADDITIONAL TESTS:    Imaging Personally Reviewed:  [ ] YES  [ ] NO      Consultant(s) Notes Reviewed:  [x ] YES  [ ] NO    Care Discussed with Consultants/Other Providers [ x] YES  [ ] NO          PAST MEDICAL & SURGICAL HISTORY:  Seizures  No significant past surgical history        Altered mental status  No pertinent family history in first degree relatives  Handoff  MEWS Score  Seizures  Altered mental status  Prophylactic measure  Substance abuse  Encephalopathy  No significant past surgical history  No significant past surgical history  No significant past surgical history  A-SEIZURE  Seizures

## 2018-11-12 NOTE — DISCHARGE NOTE ADULT - PLAN OF CARE
Resolved Pt was admitted for alcohol withdrawal seizure.  Pt was started on CIWA protocol and was given IV fluids and IV thiamine. [facility] EEG was done and neurologist read it as abnormal EEG activity and we planned MRI. Pt was initially very aggressive but currently he is calmed down. On discharge CIWA is zero and he has not required any ativan in last 24 hours.  Pt is cleared for discharge per neurology. prevention of further episodes. Pt is started on Keppra. pt has no seizure while admitted.

## 2018-11-12 NOTE — DISCHARGE NOTE ADULT - CARE PLAN
Principal Discharge DX:	Encephalopathy  Goal:	Resolved  Assessment and plan of treatment:	Pt was admitted for alcohol withdrawal seizure.  Pt was started on CIWA protocol and was given IV fluids and IV thiamine. Pt was observed for seizures and was started on Keppra Neurology recommended EEG. EEG was done and neurologist read it as abnormal EEG activity and we planned MRI. Pt was initially very aggressive but currently he is calmed down. On discharge CIWA is zero and he has not required any ativan in last 24 hours.  Pt is cleared for discharge per neurology.  Secondary Diagnosis:	Seizures  Goal:	prevention of further episodes.  Assessment and plan of treatment:	Pt is started on Keppra. pt has no seizure while admitted.

## 2018-11-12 NOTE — DISCHARGE NOTE ADULT - HOSPITAL COURSE
HPI:  Patient is a 32 y/o M unknown PMHx was brought in by EMS after a witnessed seizure by a bystander. No description of seizure activity available. Patient remained post ictal for 5 mins as per EMS note. EMS reported patient as AAO x 3. Patient revealed he has history of seizure and had a seizure 10 days ago. In Ed patient was unresponsive. As per Ed provider patient initially unresponsive later became very aggressive and combative during CT scan, requiring extra staff to calm him down and received versed . On Resident evaluation patient was initially sleeping and woke up after 5 mins he tried to walk out of bed, patient is awake and alert but doesn't answer questions. He was eagerly eating food provided to him. ROS are unable to obtain. In Ed patient's vitals were stable. CT head showed Soft tissue swelling in the  left periorbital region and overlying the left forehead. Fracture deformities of both nasal bones, the left maxillary sinus, lateral wall the left orbit and left zygomatic arch which are probably chronic. urine tox is positive for Cannabis. BAL is <3. un known if uses Alcohol However; Alcohol with drawl seizures can occur 48 hours post Alcohol use.  Pt was started on CIWA protocol and was given IV fluids and IV thiamine. Pt was observed for seizures and was started on Keppra Neurology recommended EEG. EEG was done and neurologist read it as abnormal EEG activity and we planned MRI. Pt was initially very aggressive but currently he is calmed down. On discharge CIWA is zero and he has not required any ativan in last 24 hours.  Pt is cleared for discharge per neurology.

## 2018-11-12 NOTE — DISCHARGE NOTE ADULT - MEDICATION SUMMARY - MEDICATIONS TO TAKE
I will START or STAY ON the medications listed below when I get home from the hospital:    Keppra 1000 mg oral tablet  -- 1 tab(s) by mouth 2 times a day   -- Check with your doctor before becoming pregnant.  It is very important that you take or use this exactly as directed.  Do not skip doses or discontinue unless directed by your doctor.  May cause drowsiness or dizziness.  Obtain medical advice before taking any non-prescription drugs as some may affect the action of this medication.  Swallow whole.  Do not crush.  This drug may impair the ability to drive or operate machinery.  Use care until you become familiar with its effects.    -- Indication: For Anti seizure medication.

## 2018-11-12 NOTE — DISCHARGE NOTE ADULT - PATIENT PORTAL LINK FT
You can access the [facility] Patient Portal, offered by [facility], by registering with the following website: [email or web address]

## 2018-11-12 NOTE — DISCHARGE NOTE ADULT - COMMUNITY RESOURCES
[facility]: [address] [place] [address], [phone], 11/20/18, 3:30 PM, with Dr. Chi.

## 2018-11-12 NOTE — PROGRESS NOTE ADULT - ASSESSMENT
Patient is a 34 y/o M unknown PMHx was brought in by EMS after a witnessed seizure by a bystander. No description of seizure activity available. Patient remained post ictal for 5 mins as per EMS note. EMS reported patient as AAO x 3. Patient revealed he has history of seizure and had a seizure 10 days ago. In Ed patient was unresponsive. As per Ed provider patient initially unresponsive later became very aggressive and combative during CT scan, requiring extra staff to calm him down and received versed.       Off 1:1 observation, no further episodes of agitation, cooperative. CIWA scoring low, declining etoh rehab on d/c. Awaiting MRI brain prior to d/c planning to shelter, likely today.

## 2018-11-13 RX ORDER — DIVALPROEX SODIUM 500 MG/1
0 TABLET, DELAYED RELEASE ORAL
Qty: 0 | Refills: 0 | COMMUNITY

## 2018-11-13 RX ORDER — LEVETIRACETAM 250 MG/1
1 TABLET, FILM COATED ORAL
Qty: 28 | Refills: 0
Start: 2018-11-13 | End: 2018-11-26

## 2018-11-13 RX ORDER — LEVETIRACETAM 250 MG/1
1 TABLET, FILM COATED ORAL
Qty: 60 | Refills: 0 | OUTPATIENT
Start: 2018-11-13 | End: 2018-12-12

## 2018-11-13 RX ADMIN — HEPARIN SODIUM 5000 UNIT(S): 5000 INJECTION INTRAVENOUS; SUBCUTANEOUS at 21:25

## 2018-11-13 RX ADMIN — HEPARIN SODIUM 5000 UNIT(S): 5000 INJECTION INTRAVENOUS; SUBCUTANEOUS at 05:28

## 2018-11-13 RX ADMIN — LEVETIRACETAM 400 MILLIGRAM(S): 250 TABLET, FILM COATED ORAL at 17:40

## 2018-11-13 RX ADMIN — LEVETIRACETAM 400 MILLIGRAM(S): 250 TABLET, FILM COATED ORAL at 05:28

## 2018-11-13 NOTE — PROGRESS NOTE ADULT - ASSESSMENT
Patient is a 34 y/o M unknown PMHx was brought in by EMS after a witnessed seizure by a bystander. No description of seizure activity available. Patient remained post ictal for 5 mins as per EMS note. EMS reported patient as AAO x 3. Patient revealed he has history of seizure and had a seizure 10 days ago. In Ed patient was unresponsive. As per Ed provider patient initially unresponsive later became very aggressive and combative during CT scan, requiring extra staff to calm him down and received versed.       Off 1:1 observation, no further episodes of agitation, cooperative. CIWA scoring low, declining etoh rehab on d/c. Awaiting MRI brain prior to d/c planning to shelter, likely in a.m.. Medically cleared for d/c, awaiting d/c pending shelter placement and medication arrangement with outpt pt medical follow ups.

## 2018-11-13 NOTE — PROGRESS NOTE ADULT - SUBJECTIVE AND OBJECTIVE BOX
VAN WOMACK  MR# [id number]  33yMale        Patient is a 33y old  Male who presents with a chief complaint of encephalopathy/seizure (12 Nov 2018 11:16)      INTERVAL HPI/OVERNIGHT EVENTS:  Patient seen and examined at bedside. Medically cleared for d/c, awaiting d/c pending shelter placement and medication arrangement with outpt pt medical follow ups. No notations of chest pain, palpitation, SOB, orthopnea, nausea, vomiting or abdominal pain.    ALLERGIES  No Known Allergies      MEDICATIONS  folic acid 1 milliGRAM(s) Oral daily  heparin  Injectable 5000 Unit(s) SubCutaneous every 8 hours  levETIRAcetam  IVPB 1000 milliGRAM(s) IV Intermittent every 12 hours  LORazepam   Injectable 2 milliGRAM(s) IntraMuscular every 4 hours PRN CIWA >7              REVIEW OF SYSTEMS:  CONSTITUTIONAL: No fever, weight loss, or fatigue  EYES: No eye pain, visual disturbances, or discharge  ENT:  No difficulty hearing, tinnitus, vertigo; No sinus or throat pain  NECK: No pain or stiffness  RESPIRATORY: No cough, wheezing, chills or hemoptysis; No Shortness of Breath  CARDIOVASCULAR: No chest pain, palpitations, passing out, dizziness, or leg swelling  GASTROINTESTINAL: No abdominal or epigastric pain. No nausea, vomiting, or hematemesis; No diarrhea or constipation. No melena or hematochezia.  GENITOURINARY: No dysuria, frequency, hematuria, or incontinence  NEUROLOGICAL: No headaches, memory loss, loss of strength, numbness, or tremors  SKIN: No itching, burning, rashes, or lesions   LYMPH Nodes: No enlarged glands  ENDOCRINE: No heat or cold intolerance; No hair loss  MUSCULOSKELETAL: No joint pain or swelling; No muscle, back, or extremity pain  PSYCHIATRIC: No depression, anxiety, mood swings, or difficulty sleeping  HEME/LYMPH: No easy bruising, or bleeding gums  ALLERGY AND IMMUNOLOGIC: No hives or eczema	    [ ] All others negative	  [ ] Unable to obtain      T(C): 36.7 (11-13-18 @ 13:42), Max: 36.7 (11-13-18 @ 13:42)  T(F): 98 (11-13-18 @ 13:42), Max: 98 (11-13-18 @ 13:42)  HR: 59 (11-13-18 @ 13:42) (54 - 65)  BP: 117/63 (11-13-18 @ 13:42) (113/63 - 126/72)  RR: 16 (11-13-18 @ 13:42) (15 - 16)  SpO2: 98% (11-13-18 @ 13:42) (98% - 100%)  Wt(kg): --    I&O's Summary        PHYSICAL EXAM:  A X O x  HEAD:  Atraumatic, Normocephalic  EYES: EOMI, PERRLA, conjunctiva and sclera clear  NECK: Supple, No JVD, Normal thyroid  Resp: CTAB, No crackles, wheezing,   CVS: Regular rate and rhythm; No discernable murmurs, rubs, or gallops  ABD: Soft, Nontender, Nondistended; Bowel sounds present  EXTREMITIES:  2+ Peripheral Pulses, No edema  LYMPH: No dicernable lymphadenopathy noted  GENERAL: NAD, well-groomed, well-developed      LABS:              CAPILLARY BLOOD GLUCOSE          Troponins:  ProBNP:  Lipid Profile:   HgA1c:  TSH:           RADIOLOGY & ADDITIONAL TESTS:    Imaging Personally Reviewed:  [ ] YES  [ ] NO      Consultant(s) Notes Reviewed:  [x ] YES  [ ] NO    Care Discussed with Consultants/Other Providers [ x] YES  [ ] NO          PAST MEDICAL & SURGICAL HISTORY:  Seizures  No significant past surgical history        Altered mental status  No pertinent family history in first degree relatives  Handoff  MEWS Score  Seizures  Encephalopathy  Altered mental status  Prophylactic measure  Substance abuse  Encephalopathy  No significant past surgical history  No significant past surgical history  No significant past surgical history  A-SEIZURE  Seizures

## 2018-11-13 NOTE — PROGRESS NOTE ADULT - PROBLEM SELECTOR PLAN 1
-Patient brought in to ED after witnessed seizure/syncope by bystander  -Aggressive and combative  -U tox+ve for cannabis. BAL -ve, however seizures can still be a possibility 2/2 to Alcohol withdrawl  -on [facility] protocol  -Ativan 2 mg Q4 prn  -Banana bag, IV thiamine, folic Acid day 2  -seizure precautions  -fall precautions  -received Keppra in Ed.   -EEG showed abnormal waveform pattren. Neurology recommended MRI for brain.  -Off 1:1 observation, no further episodes of agitation, cooperative. CIWA scoring low, declining etoh rehab on d/c. Awaiting MRI brain prior to d/c planning to shelter, likely today. -Patient brought in to ED after witnessed seizure/syncope by bystander  -Aggressive and combative on admission  -U tox+ve for cannabis. BAL -ve, however seizures can still be a possibility 2/2 to Alcohol withdrawl  -on [facility] protocol  -Ativan 2 mg Q4 prn  -Banana bag, IV thiamine, folic Acid day 2  -seizure precautions  -fall precautions  -received Keppra in Ed.   -EEG showed abnormal waveform pattren. Neurology recommended MRI for brain.  -Off 1:1 observation, no further episodes of agitation, cooperative. CIWA scoring low, declining etoh rehab on d/c. MRI brain prior WNL. D/C planning to shelter, likely today.

## 2018-11-13 NOTE — PROGRESS NOTE ADULT - REASON FOR ADMISSION
encephalopathy/seizure

## 2018-11-14 VITALS
TEMPERATURE: 98 F | HEART RATE: 48 BPM | OXYGEN SATURATION: 100 % | RESPIRATION RATE: 18 BRPM | DIASTOLIC BLOOD PRESSURE: 52 MMHG | SYSTOLIC BLOOD PRESSURE: 103 MMHG

## 2018-11-14 PROCEDURE — 82962 GLUCOSE BLOOD TEST: CPT

## 2018-11-14 PROCEDURE — 85027 COMPLETE CBC AUTOMATED: CPT

## 2018-11-14 PROCEDURE — 95957 EEG DIGITAL ANALYSIS: CPT

## 2018-11-14 PROCEDURE — 80048 BASIC METABOLIC PNL TOTAL CA: CPT

## 2018-11-14 PROCEDURE — 70450 CT HEAD/BRAIN W/O DYE: CPT

## 2018-11-14 PROCEDURE — 96374 THER/PROPH/DIAG INJ IV PUSH: CPT

## 2018-11-14 PROCEDURE — 81001 URINALYSIS AUTO W/SCOPE: CPT

## 2018-11-14 PROCEDURE — 80053 COMPREHEN METABOLIC PANEL: CPT

## 2018-11-14 PROCEDURE — 83735 ASSAY OF MAGNESIUM: CPT

## 2018-11-14 PROCEDURE — 99285 EMERGENCY DEPT VISIT HI MDM: CPT | Mod: 25

## 2018-11-14 PROCEDURE — 95819 EEG AWAKE AND ASLEEP: CPT

## 2018-11-14 PROCEDURE — 80307 DRUG TEST PRSMV CHEM ANLYZR: CPT

## 2018-11-14 PROCEDURE — 84100 ASSAY OF PHOSPHORUS: CPT

## 2018-11-14 PROCEDURE — 70551 MRI BRAIN STEM W/O DYE: CPT

## 2018-11-14 PROCEDURE — 71045 X-RAY EXAM CHEST 1 VIEW: CPT

## 2018-11-14 PROCEDURE — 96375 TX/PRO/DX INJ NEW DRUG ADDON: CPT

## 2018-11-14 PROCEDURE — 80076 HEPATIC FUNCTION PANEL: CPT

## 2018-11-14 PROCEDURE — 93005 ELECTROCARDIOGRAM TRACING: CPT

## 2018-11-14 RX ADMIN — Medication 1 MILLIGRAM(S): at 11:10

## 2018-11-14 RX ADMIN — HEPARIN SODIUM 5000 UNIT(S): 5000 INJECTION INTRAVENOUS; SUBCUTANEOUS at 05:43

## 2018-11-14 RX ADMIN — LEVETIRACETAM 400 MILLIGRAM(S): 250 TABLET, FILM COATED ORAL at 05:43

## 2018-11-14 RX ADMIN — HEPARIN SODIUM 5000 UNIT(S): 5000 INJECTION INTRAVENOUS; SUBCUTANEOUS at 14:31

## 2019-06-22 ENCOUNTER — EMERGENCY (EMERGENCY)
Facility: HOSPITAL | Age: 34
LOS: 0 days | Discharge: HOME | End: 2019-06-22
Attending: EMERGENCY MEDICINE | Admitting: EMERGENCY MEDICINE
Payer: MEDICAID

## 2019-06-22 VITALS
OXYGEN SATURATION: 98 % | HEART RATE: 93 BPM | RESPIRATION RATE: 18 BRPM | DIASTOLIC BLOOD PRESSURE: 85 MMHG | TEMPERATURE: 98 F | SYSTOLIC BLOOD PRESSURE: 125 MMHG

## 2019-06-22 DIAGNOSIS — G40.909 EPILEPSY, UNSPECIFIED, NOT INTRACTABLE, WITHOUT STATUS EPILEPTICUS: ICD-10-CM

## 2019-06-22 DIAGNOSIS — R56.9 UNSPECIFIED CONVULSIONS: ICD-10-CM

## 2019-06-22 PROCEDURE — 99284 EMERGENCY DEPT VISIT MOD MDM: CPT

## 2019-06-22 NOTE — ED PROVIDER NOTE - OBJECTIVE STATEMENT
36y m hx seizures presenting to ED from psych facility [facility] after seizure. Pt compliant with depakote, follows care with neurologist at residential care facility. Last seizure was one month ago. Currently post-ictal. He denies chest pain, shortness of breath, abdominal pain, or other complaints 36y m hx seizures presenting to ED from psych facility [facility] after seizure. Pt compliant with depakote, follows care with neurologist at residential care facility. Last seizure was one month ago. Currently gradually returning back to baseline per staff member from psych facility. He denies chest pain, shortness of breath, abdominal pain, or other complaints

## 2019-06-22 NOTE — ED PROVIDER NOTE - CLINICAL SUMMARY MEDICAL DECISION MAKING FREE TEXT BOX
Patient presented s/p seizure. Patient on depakote for seizure and at baseline has 1-2 breakthrough seizures per month. This current seizure was similar to prior seizures per aide at bedside. On arrival patient asymptomatic. FS WNL. Observed in ED without recurrence. Patient tolerated PO without difficulty. Will discharge home with outpatient follow up. Patient agreeable with plan. Agrees to return to ED for any new or worsening symptoms.

## 2019-06-22 NOTE — ED PROVIDER NOTE - PHYSICAL EXAMINATION
CONSTITUTIONAL: Well-developed; well-nourished;   SKIN: warm, dry  HEAD: Normocephalic; atraumatic.  EYES: no conjunctival injection. PERRL.   ENT: No nasal discharge; airway clear.  NECK: Supple; non tender.  CARD: S1, S2 normal; no murmurs, gallops, or rubs. Regular rate and rhythm.   RESP: No wheezes, rales or rhonchi.  ABD: soft ntnd  EXT: Normal ROM.  No clubbing, cyanosis or edema.   NEURO: AAO x3; , grossly unremarkable; lethargic  PSYCH: Cooperative, appropriate. CONSTITUTIONAL: Well-developed; well-nourished; in no acute distress. +soiled pants s/p urinary incontinence   SKIN: warm, dry  HEAD: Normocephalic; atraumatic.  EYES: no conj injection  ENT: no abrasions or lacerations noted; No nasal discharge; airway clear.  NECK: Supple; non tender.  CARD: S1, S2 normal; no murmurs, gallops, or rubs. Regular rate and rhythm.   RESP: No wheezes, rales or rhonchi.  ABD: soft ntnd  EXT: Normal ROM.  No clubbing, cyanosis or edema.   NEURO: answering questions appropriately, A&Ox3  PSYCH: Cooperative, appropriate.

## 2019-06-22 NOTE — ED PROVIDER NOTE - ATTENDING CONTRIBUTION TO CARE
36 year old male, pmhx seizures on depakote, presenting s/p seizure from psych facility. Per aide at bedside and patient, patient has 1-2 breakthrough seizures per month which is his baseline, and this was typical of those prior seizures. Patient at this time denies complaints. Denies fevers, headache, vision changes, weakness/numbness, confusion, URI symptoms, neck pain, chest pain, back pain, dyspnea, cough, palpitations, nausea, vomiting, abdominal pain, diarrhea, constipation, blood in stool/dark stools, urinary symptoms, penile discharge/testicular pain, leg swelling, rash, recent travel or sick contacts.    Vital Signs: I have reviewed the initial vital signs.  Constitutional: NAD, well-nourished, appears stated age, no acute distress.  HEENT: Airway patent, moist MM, no erythema/swelling/deformity of oral structures. EOMI, PERRLA.  CV: regular rate, regular rhythm, well-perfused extremities, 2+ b/l DP and radial pulses equal.  Lungs: BCTA, no increased WOB.  ABD: NTND, no guarding or rebound, no pulsatile mass, no hernias.   MSK: Neck supple, nontender, nl ROM, no stepoff. Chest nontender. Back nontender in TLS spine or to b/l bony structures or flanks. Ext nontender, nl rom, no deformity.   INTEG: Skin warm, dry, no rash.  NEURO: A&Ox3, normal strength, nl sensation throughout, normal speech.   PSYCH: Calm, cooperative, normal affect and interaction.    Will check FS but otherwise patient with baseline breakthrough seizures 1-2x per month. Likely discharge back to facility.

## 2019-06-22 NOTE — ED ADULT NURSE REASSESSMENT NOTE - NS ED NURSE REASSESS COMMENT FT1
Pt tolerated PO food and is awake, alert and oriented x 3. Pt DC, no IV access, awaiting transportation back to [facility].

## 2019-06-22 NOTE — ED PROVIDER NOTE - NSFOLLOWUPINSTRUCTIONS_ED_ALL_ED_FT
Seizure, Adult  A seizure is a sudden burst of abnormal electrical activity in the brain. The abnormal activity temporarily interrupts normal brain function, causing a person to experience any of the following:    Involuntary movements.  Changes in awareness or consciousness.  Uncontrollable shaking (convulsions).    Seizures usually last from 30 seconds to 2 minutes. They usually do not cause permanent brain damage unless they are prolonged.    What can cause a seizure to happen?     Seizures can happen for many reasons including:    A fever.  Low blood sugar.  A medicine.  An illnesses.  A brain injury.    Some people who have a seizure never have another one. People who have repeated seizures have a condition called epilepsy.    What are the symptoms of a seizure?     Symptoms of a seizure vary greatly from person to person. They include:    Convulsions.  Stiffening of the body.  Involuntary movements of the arms or legs.  Loss of consciousness.  Breathing problems.  Falling suddenly.  Confusion.  Head nodding.  Eye blinking or fluttering.  Lip smacking.  Drooling.  Rapid eye movements.  Grunting.  Loss of bladder control and bowel control.  Staring.  Unresponsiveness.    Some people have symptoms right before a seizure happens (aura) and right after a seizure happens. Symptoms of an aura include:    Fear or anxiety.  Nausea.  Feeling like the room is spinning (vertigo).  A feeling of having seen or heard something before (orly vu).  Odd tastes or smells.  Changes in vision, such as seeing flashing lights or spots.    Symptoms that may follow a seizure include:    Confusion.  Sleepiness.  Headache.  Weakness of one side of the body.    Follow these instructions at home:  Medicines     Image   Take over-the-counter and prescription medicines only as told by your health care provider.  Avoid any substances that may prevent your medicine from working properly, such as alcohol.  Activity     Do not drive, swim, or do any other activities that would be dangerous if you had another seizure. Wait until your health care provider approves.  If you live in the [place] check with your local DMV (department of motor vehicles) to find out about the local driving laws. Each state has specific rules about when you can legally return to driving.  Get enough rest. Lack of sleep can make seizures more likely to occur.  Educating others     Teach friends and family what to do if you have a seizure. They should:    Lay you on the ground to prevent a fall.  Cushion your head and body.  Loosen any tight clothing around your neck.  Turn you on your side. If vomiting occurs, this helps keep your airway clear.  Stay with you until you recover.  Not hold you down. Holding you down will not stop the seizure.  Not put anything in your mouth.  Know whether or not you need emergency care.    General instructions     Contact your health care provider each time you have a seizure.  Avoid anything that has ever triggered a seizure for you.  Keep a seizure diary. Record what you remember about each seizure, especially anything that might have triggered the seizure.  Keep all follow-up visits as told by your health care provider. This is important.  Contact a health care provider if:  You have another seizure.  You have seizures more often.  Your seizure symptoms change.  You continue to have seizures with treatment.  You have symptoms of an infection or illness. They might increase your risk of having a seizure.  Get help right away if:  You have a seizure:    That lasts longer than 5 minutes.  That is different than previous seizures.  That leaves you unable to speak or use a part of your body.  That makes it harder to breathe.  After a head injury.    You have:    Multiple seizures in a row.  Confusion or a severe headache right after a seizure.    You are having seizures more often.  You do not wake up immediately after a seizure.  You injure yourself during a seizure.  These symptoms may represent a serious problem that is an emergency. Do not wait to see if the symptoms will go away. Get medical help right away. Call your local emergency services (911 in the [place] Do not drive yourself to the hospital.     This information is not intended to replace advice given to you by your health care provider. Make sure you discuss any questions you have with your health care provider.

## 2019-06-30 ENCOUNTER — EMERGENCY (EMERGENCY)
Facility: HOSPITAL | Age: 34
LOS: 0 days | Discharge: HOME | End: 2019-06-30
Attending: STUDENT IN AN ORGANIZED HEALTH CARE EDUCATION/TRAINING PROGRAM | Admitting: STUDENT IN AN ORGANIZED HEALTH CARE EDUCATION/TRAINING PROGRAM
Payer: MEDICAID

## 2019-06-30 VITALS — TEMPERATURE: 98 F | SYSTOLIC BLOOD PRESSURE: 142 MMHG | DIASTOLIC BLOOD PRESSURE: 74 MMHG | HEART RATE: 82 BPM

## 2019-06-30 VITALS
RESPIRATION RATE: 17 BRPM | SYSTOLIC BLOOD PRESSURE: 130 MMHG | HEART RATE: 85 BPM | DIASTOLIC BLOOD PRESSURE: 77 MMHG | WEIGHT: 160.06 LBS | OXYGEN SATURATION: 98 % | TEMPERATURE: 98 F

## 2019-06-30 DIAGNOSIS — F20.9 SCHIZOPHRENIA, UNSPECIFIED: ICD-10-CM

## 2019-06-30 DIAGNOSIS — Z04.6 ENCOUNTER FOR GENERAL PSYCHIATRIC EXAMINATION, REQUESTED BY AUTHORITY: ICD-10-CM

## 2019-06-30 LAB
ALBUMIN SERPL ELPH-MCNC: 4.1 G/DL — SIGNIFICANT CHANGE UP (ref 3.5–5.2)
ALP SERPL-CCNC: 11 U/L — LOW (ref 30–115)
ALT FLD-CCNC: 23 U/L — SIGNIFICANT CHANGE UP (ref 0–41)
ANION GAP SERPL CALC-SCNC: 10 MMOL/L — SIGNIFICANT CHANGE UP (ref 7–14)
APAP SERPL-MCNC: <5 UG/ML — LOW (ref 10–30)
AST SERPL-CCNC: 49 U/L — HIGH (ref 0–41)
BASE EXCESS BLDV CALC-SCNC: 2.8 MMOL/L — HIGH (ref -2–2)
BASOPHILS # BLD AUTO: 0.01 K/UL — SIGNIFICANT CHANGE UP (ref 0–0.2)
BASOPHILS NFR BLD AUTO: 0.1 % — SIGNIFICANT CHANGE UP (ref 0–1)
BILIRUB SERPL-MCNC: 0.4 MG/DL — SIGNIFICANT CHANGE UP (ref 0.2–1.2)
BUN SERPL-MCNC: 19 MG/DL — SIGNIFICANT CHANGE UP (ref 10–20)
CA-I SERPL-SCNC: 1.33 MMOL/L — HIGH (ref 1.12–1.3)
CALCIUM SERPL-MCNC: 10 MG/DL — SIGNIFICANT CHANGE UP (ref 8.5–10.1)
CHLORIDE SERPL-SCNC: 105 MMOL/L — SIGNIFICANT CHANGE UP (ref 98–110)
CO2 SERPL-SCNC: 26 MMOL/L — SIGNIFICANT CHANGE UP (ref 17–32)
CREAT SERPL-MCNC: 1.3 MG/DL — SIGNIFICANT CHANGE UP (ref 0.7–1.5)
EOSINOPHIL # BLD AUTO: 0.01 K/UL — SIGNIFICANT CHANGE UP (ref 0–0.7)
EOSINOPHIL NFR BLD AUTO: 0.1 % — SIGNIFICANT CHANGE UP (ref 0–8)
ETHANOL SERPL-MCNC: <10 MG/DL — SIGNIFICANT CHANGE UP
GAS PNL BLDV: 142 MMOL/L — SIGNIFICANT CHANGE UP (ref 136–145)
GAS PNL BLDV: SIGNIFICANT CHANGE UP
GLUCOSE SERPL-MCNC: 93 MG/DL — SIGNIFICANT CHANGE UP (ref 70–99)
HCO3 BLDV-SCNC: 29 MMOL/L — SIGNIFICANT CHANGE UP (ref 22–29)
HCT VFR BLD CALC: 41.1 % — LOW (ref 42–52)
HCT VFR BLDA CALC: 48.7 % — HIGH (ref 34–44)
HGB BLD CALC-MCNC: 15.9 G/DL — SIGNIFICANT CHANGE UP (ref 14–18)
HGB BLD-MCNC: 14.4 G/DL — SIGNIFICANT CHANGE UP (ref 14–18)
IMM GRANULOCYTES NFR BLD AUTO: 0.5 % — HIGH (ref 0.1–0.3)
LACTATE BLDV-MCNC: 1.7 MMOL/L — HIGH (ref 0.5–1.6)
LACTATE SERPL-SCNC: 2 MMOL/L — SIGNIFICANT CHANGE UP (ref 0.5–2.2)
LIDOCAIN IGE QN: 28 U/L — SIGNIFICANT CHANGE UP (ref 7–60)
LYMPHOCYTES # BLD AUTO: 2.19 K/UL — SIGNIFICANT CHANGE UP (ref 1.2–3.4)
LYMPHOCYTES # BLD AUTO: 23.2 % — SIGNIFICANT CHANGE UP (ref 20.5–51.1)
MAGNESIUM SERPL-MCNC: 1.9 MG/DL — SIGNIFICANT CHANGE UP (ref 1.8–2.4)
MCHC RBC-ENTMCNC: 32.6 PG — HIGH (ref 27–31)
MCHC RBC-ENTMCNC: 35 G/DL — SIGNIFICANT CHANGE UP (ref 32–37)
MCV RBC AUTO: 93 FL — SIGNIFICANT CHANGE UP (ref 80–94)
MONOCYTES # BLD AUTO: 1.2 K/UL — HIGH (ref 0.1–0.6)
MONOCYTES NFR BLD AUTO: 12.7 % — HIGH (ref 1.7–9.3)
NEUTROPHILS # BLD AUTO: 5.98 K/UL — SIGNIFICANT CHANGE UP (ref 1.4–6.5)
NEUTROPHILS NFR BLD AUTO: 63.4 % — SIGNIFICANT CHANGE UP (ref 42.2–75.2)
NRBC # BLD: 0 /100 WBCS — SIGNIFICANT CHANGE UP (ref 0–0)
PCO2 BLDV: 47 MMHG — SIGNIFICANT CHANGE UP (ref 41–51)
PH BLDV: 7.39 — SIGNIFICANT CHANGE UP (ref 7.26–7.43)
PLATELET # BLD AUTO: 119 K/UL — LOW (ref 130–400)
PO2 BLDV: 49 MMHG — HIGH (ref 20–40)
POTASSIUM BLDV-SCNC: 3.9 MMOL/L — SIGNIFICANT CHANGE UP (ref 3.3–5.6)
POTASSIUM SERPL-MCNC: 5.5 MMOL/L — HIGH (ref 3.5–5)
POTASSIUM SERPL-SCNC: 5.5 MMOL/L — HIGH (ref 3.5–5)
PROT SERPL-MCNC: 7.5 G/DL — SIGNIFICANT CHANGE UP (ref 6–8)
RBC # BLD: 4.42 M/UL — LOW (ref 4.7–6.1)
RBC # FLD: 12.4 % — SIGNIFICANT CHANGE UP (ref 11.5–14.5)
SALICYLATES SERPL-MCNC: <0.3 MG/DL — LOW (ref 4–30)
SAO2 % BLDV: 84 % — SIGNIFICANT CHANGE UP
SODIUM SERPL-SCNC: 141 MMOL/L — SIGNIFICANT CHANGE UP (ref 135–146)
VALPROATE SERPL-MCNC: 95 UG/ML — SIGNIFICANT CHANGE UP (ref 50–100)
WBC # BLD: 9.44 K/UL — SIGNIFICANT CHANGE UP (ref 4.8–10.8)
WBC # FLD AUTO: 9.44 K/UL — SIGNIFICANT CHANGE UP (ref 4.8–10.8)

## 2019-06-30 PROCEDURE — 93010 ELECTROCARDIOGRAM REPORT: CPT

## 2019-06-30 PROCEDURE — 99053 MED SERV 10PM-8AM 24 HR FAC: CPT

## 2019-06-30 PROCEDURE — 70450 CT HEAD/BRAIN W/O DYE: CPT | Mod: 26

## 2019-06-30 PROCEDURE — 99284 EMERGENCY DEPT VISIT MOD MDM: CPT

## 2019-06-30 NOTE — ED PROVIDER NOTE - OBJECTIVE STATEMENT
Patient is a 35 yo M w/ hx of seizure disorder on depakote, schizophrenia on respiradone Patient is a 35 yo M w/ hx of seizure disorder on depakote, schizophrenia on respiradone and clonazepam brought in by police after found walking on the [facility] bridge. Patient states he was visiting his mother in [place]; does not recall walking on the bridge but states he was on the bus on his way home to [facility]. Patient denies taking drugs; denies suicidal/homicidal ideations, denies auditory/visual hallucinations. Per nurse at [facility] (at [phone]) patient is usually sleepy at baseline but no further information given about patient's mental status.

## 2019-06-30 NOTE — ED ADULT NURSE NOTE - OBJECTIVE STATEMENT
Pt BIBA after seen [place] coming from [place]. Pt reports that he was not trying to hurt himself and did not have money to get a ride across the bridge. Pt lives in [facility]. No distressed noted. Responds to questions.

## 2019-06-30 NOTE — ED ADULT NURSE REASSESSMENT NOTE - NS ED NURSE REASSESS COMMENT FT1
Patient being discharged from the Er, no signs or symptoms of SOB, discomfort or pain. Discharge instructions provided with understanding noted,.

## 2019-06-30 NOTE — ED PROVIDER NOTE - ATTENDING CONTRIBUTION TO CARE
37 yo male PMH as noted, resident at [facility] found  walking on the [facility] [address], stated he sis not have money for transportation.  Denies any specific complaints, there are no signs of trauma, exam as note.  Patient is a very poor historian, will check labs and observe in ED prior to dispo.

## 2019-06-30 NOTE — ED PROVIDER NOTE - PHYSICAL EXAMINATION
CONSTITUTIONAL: Well-developed; well-nourished; in no acute distress.   SKIN: warm, dry.  HEAD: Normocephalic; atraumatic.  EYES: Pupils 7mm bilaterally; reactive to light bilaterally. EOMI, no conjunctival erythema  ENT: No nasal discharge; airway clear.  NECK: Supple; non tender. no midline cervical tenderness.   CARD: S1, S2 normal; no murmurs, gallops, or rubs. Regular rate and rhythm.   RESP: No wheezes, rales or rhonchi.  ABD: soft ntnd.  EXT: Normal ROM.   NEURO: Alert, oriented x2; does not know date. Moving all four extremities symmetrically.   PSYCH: Cooperative, appropriate. Denies suicidal/homicidal ideation.

## 2019-06-30 NOTE — ED ADULT NURSE REASSESSMENT NOTE - NS ED NURSE REASSESS COMMENT FT1
Initial shift assessment complete, all lines and tubes intact and patent. No signs or symptoms of SOB, discomfort or pain. Alert and oriented x 4. Initial shift assessment complete, all lines and tubes intact and patent. No signs or symptoms of SOB, discomfort or pain. Alert and oriented x 2.

## 2019-06-30 NOTE — ED ADULT TRIAGE NOTE - CHIEF COMPLAINT QUOTE
Pt was walking on the [facility] bridge and bridge and tunnel authorities called 911. Pt is a resident at the [facility]. # 2, stated he was visiting a friend in [place] and was trying to get home to [place]. Pt has no complaints.

## 2019-06-30 NOTE — ED PROVIDER NOTE - CLINICAL SUMMARY MEDICAL DECISION MAKING FREE TEXT BOX
Pt accepted in good condition from Dr. Cross. Pt with schizoprhenia, found walking on [place] bridge. Pt remained sleepy from long time. Work up negative for acute serious pathology requiring further emergent intervention or hospitalization. Pt eventually fully awake, reiterates visiting friend/ family in [place], had trouble getting back to [place]. Denies SI/ HI/ hallucination. Pt has no complaints. PO tolerant and walking w.o assistance. Pt stable for dc.

## 2019-06-30 NOTE — ED PROVIDER NOTE - NS ED ROS FT
Constitutional: No fevers.   Eyes:  No visual changes.  Cardiac:  No chest pain.  Respiratory:  No cough or respiratory distress.   GI:  No nausea, vomiting, diarrhea or abdominal pain.  :  No dysuria.   MS:  No myalgia, muscle weakness, joint pain or back pain.  Neuro:  No headache. +seizure disorder.   Skin:  No skin rash.   Endocrine: No history of thyroid disease or diabetes.  Psych: No suicidal/homicidal ideation; no hallucinations.

## 2019-06-30 NOTE — ED ADULT NURSE NOTE - CHIEF COMPLAINT QUOTE
Pt was walking on the [facility] bridge and bridge and tunnel authorities called 911. Pt is a resident at the [facility]. # 2, stated he was visiting a friend in [place] and was trying to get home to [place]. Pt has no complaints.

## 2019-06-30 NOTE — ED PROVIDER NOTE - PROGRESS NOTE DETAILS
When sleeping, patient very difficult to arouse. Patient wakes up to painful stimuli. When awake, answers questions and wants to eat. Endorsed to Dr Galvan to reassess when awake and dispo. pt alert, ate, had no complaints, denies homicidal or suicidal ideations.

## 2019-07-10 ENCOUNTER — OUTPATIENT (OUTPATIENT)
Dept: OUTPATIENT SERVICES | Facility: HOSPITAL | Age: 34
LOS: 1 days | Discharge: HOME | End: 2019-07-10

## 2019-07-10 DIAGNOSIS — Z79.899 OTHER LONG TERM (CURRENT) DRUG THERAPY: ICD-10-CM

## 2019-07-10 DIAGNOSIS — F20.9 SCHIZOPHRENIA, UNSPECIFIED: ICD-10-CM

## 2019-08-20 PROBLEM — R56.9 UNSPECIFIED CONVULSIONS: Chronic | Status: ACTIVE | Noted: 2018-11-07

## 2019-09-24 ENCOUNTER — EMERGENCY (EMERGENCY)
Facility: HOSPITAL | Age: 34
LOS: 0 days | Discharge: HOME | End: 2019-09-24
Attending: EMERGENCY MEDICINE | Admitting: EMERGENCY MEDICINE
Payer: MEDICAID

## 2019-09-24 VITALS
DIASTOLIC BLOOD PRESSURE: 91 MMHG | OXYGEN SATURATION: 96 % | RESPIRATION RATE: 18 BRPM | TEMPERATURE: 99 F | SYSTOLIC BLOOD PRESSURE: 151 MMHG | HEART RATE: 111 BPM

## 2019-09-24 DIAGNOSIS — Z87.891 PERSONAL HISTORY OF NICOTINE DEPENDENCE: ICD-10-CM

## 2019-09-24 DIAGNOSIS — G40.909 EPILEPSY, UNSPECIFIED, NOT INTRACTABLE, WITHOUT STATUS EPILEPTICUS: ICD-10-CM

## 2019-09-24 LAB
ALBUMIN SERPL ELPH-MCNC: 4.3 G/DL — SIGNIFICANT CHANGE UP (ref 3.5–5.2)
ALP SERPL-CCNC: 24 U/L — LOW (ref 30–115)
ALT FLD-CCNC: 12 U/L — SIGNIFICANT CHANGE UP (ref 0–41)
ANION GAP SERPL CALC-SCNC: 16 MMOL/L — HIGH (ref 7–14)
AST SERPL-CCNC: 18 U/L — SIGNIFICANT CHANGE UP (ref 0–41)
BILIRUB SERPL-MCNC: 0.4 MG/DL — SIGNIFICANT CHANGE UP (ref 0.2–1.2)
BUN SERPL-MCNC: 13 MG/DL — SIGNIFICANT CHANGE UP (ref 10–20)
CALCIUM SERPL-MCNC: 9.7 MG/DL — SIGNIFICANT CHANGE UP (ref 8.5–10.1)
CHLORIDE SERPL-SCNC: 103 MMOL/L — SIGNIFICANT CHANGE UP (ref 98–110)
CO2 SERPL-SCNC: 23 MMOL/L — SIGNIFICANT CHANGE UP (ref 17–32)
CREAT SERPL-MCNC: 1.2 MG/DL — SIGNIFICANT CHANGE UP (ref 0.7–1.5)
GLUCOSE SERPL-MCNC: 91 MG/DL — SIGNIFICANT CHANGE UP (ref 70–99)
HCT VFR BLD CALC: 44.8 % — SIGNIFICANT CHANGE UP (ref 42–52)
HGB BLD-MCNC: 15.9 G/DL — SIGNIFICANT CHANGE UP (ref 14–18)
MCHC RBC-ENTMCNC: 32 PG — HIGH (ref 27–31)
MCHC RBC-ENTMCNC: 35.5 G/DL — SIGNIFICANT CHANGE UP (ref 32–37)
MCV RBC AUTO: 90.1 FL — SIGNIFICANT CHANGE UP (ref 80–94)
NRBC # BLD: 0 /100 WBCS — SIGNIFICANT CHANGE UP (ref 0–0)
PLATELET # BLD AUTO: 128 K/UL — LOW (ref 130–400)
POTASSIUM SERPL-MCNC: 4.2 MMOL/L — SIGNIFICANT CHANGE UP (ref 3.5–5)
POTASSIUM SERPL-SCNC: 4.2 MMOL/L — SIGNIFICANT CHANGE UP (ref 3.5–5)
PROT SERPL-MCNC: 7.1 G/DL — SIGNIFICANT CHANGE UP (ref 6–8)
RBC # BLD: 4.97 M/UL — SIGNIFICANT CHANGE UP (ref 4.7–6.1)
RBC # FLD: 11.5 % — SIGNIFICANT CHANGE UP (ref 11.5–14.5)
SODIUM SERPL-SCNC: 142 MMOL/L — SIGNIFICANT CHANGE UP (ref 135–146)
VALPROATE SERPL-MCNC: 24 UG/ML — LOW (ref 50–100)
WBC # BLD: 8.2 K/UL — SIGNIFICANT CHANGE UP (ref 4.8–10.8)
WBC # FLD AUTO: 8.2 K/UL — SIGNIFICANT CHANGE UP (ref 4.8–10.8)

## 2019-09-24 PROCEDURE — 99284 EMERGENCY DEPT VISIT MOD MDM: CPT

## 2019-09-24 RX ORDER — DIVALPROEX SODIUM 500 MG/1
500 TABLET, DELAYED RELEASE ORAL ONCE
Refills: 0 | Status: COMPLETED | OUTPATIENT
Start: 2019-09-24 | End: 2019-09-24

## 2019-09-24 RX ORDER — SODIUM CHLORIDE 9 MG/ML
1000 INJECTION INTRAMUSCULAR; INTRAVENOUS; SUBCUTANEOUS ONCE
Refills: 0 | Status: COMPLETED | OUTPATIENT
Start: 2019-09-24 | End: 2019-09-24

## 2019-09-24 NOTE — ED PROVIDER NOTE - PROGRESS NOTE DETAILS
Paitent up and stable he Is requesting meal tray he was able to tolerate po found to be subtherapeutic on depakote I will administer dose patient is improved asymptomatic at this time I will discharge with follow up by neurology.

## 2019-09-24 NOTE — ED PROVIDER NOTE - CLINICAL SUMMARY MEDICAL DECISION MAKING FREE TEXT BOX
Paitent seizure free here in the Ed he tolerated lunch tray, vital signs normal I will discharge at this time with follow up to neurology

## 2019-09-24 NOTE — ED ADULT NURSE NOTE - CHIEF COMPLAINT QUOTE
as per pt had whitenessed seizure , it lasted about 30 seconds . pt was seated. did not have any head trauma. pt has seizure disorder but unknown when last seizure occurred. pt from [address] [place], [place] [id number]

## 2019-09-24 NOTE — ED PROVIDER NOTE - PATIENT PORTAL LINK FT
You can access the FollowMyHealth Patient Portal offered by [facility] by registering at the following website: [email or web address]. By joining [facility]’s FollowMyHealth portal, you will also be able to view your health information using other applications (apps) compatible with our system.

## 2019-09-24 NOTE — ED ADULT TRIAGE NOTE - CHIEF COMPLAINT QUOTE
as per pt had whitenessed seizure , it lasted about 30 seconds . pt was seated. did not have any head trauma. pt has seizure disorder but unknown when last seizure occurred. pt from [address] [place], [place] [address]

## 2019-09-24 NOTE — ED ADULT NURSE NOTE - NSIMPLEMENTINTERV_GEN_ALL_ED
Implemented All Fall Risk Interventions:  [place] to call system. Call bell, personal items and telephone within reach. Instruct patient to call for assistance. Room bathroom lighting operational. Non-slip footwear when patient is off stretcher. Physically safe environment: no spills, clutter or unnecessary equipment. Stretcher in lowest position, wheels locked, appropriate side rails in place. Provide visual cue, wrist band, yellow gown, etc. Monitor gait and stability. Monitor for mental status changes and reorient to person, place, and time. Review medications for side effects contributing to fall risk. Reinforce activity limits and safety measures with patient and family.

## 2019-09-24 NOTE — ED PROVIDER NOTE - OBJECTIVE STATEMENT
Patient is a 33 yo m with seizure history presents after having a witnessed seizure he denies any fevers or chills he denies any vomiting, He denies any abdominal pain or back pain, patient takes keppra as well as depakote.  he denies any headaches or visual changes.

## 2019-09-24 NOTE — ED ADULT TRIAGE NOTE - HEART RATE (BEATS/MIN)
Pt has been on medication since Monday for Pneumonia and was wondering if it is okay for her to be around her daughter-in-law because she is pregnant. Please advise. 111

## 2019-09-25 PROBLEM — Z00.00 ENCOUNTER FOR PREVENTIVE HEALTH EXAMINATION: Status: ACTIVE | Noted: 2019-09-25

## 2019-09-25 PROBLEM — Z00.00 ENCOUNTER FOR PREVENTIVE HEALTH EXAMINATION: Noted: 2019-09-25

## 2019-10-03 ENCOUNTER — OUTPATIENT (OUTPATIENT)
Dept: OUTPATIENT SERVICES | Facility: HOSPITAL | Age: 34
LOS: 1 days | Discharge: HOME | End: 2019-10-03

## 2019-10-03 DIAGNOSIS — B18.2 CHRONIC VIRAL HEPATITIS C: ICD-10-CM

## 2019-10-03 DIAGNOSIS — F25.0 SCHIZOAFFECTIVE DISORDER, BIPOLAR TYPE: ICD-10-CM

## 2019-10-15 ENCOUNTER — APPOINTMENT (OUTPATIENT)
Dept: NEUROLOGY | Facility: CLINIC | Age: 34
End: 2019-10-15
Payer: MEDICAID

## 2019-10-15 ENCOUNTER — OUTPATIENT (OUTPATIENT)
Dept: OUTPATIENT SERVICES | Facility: HOSPITAL | Age: 34
LOS: 1 days | Discharge: HOME | End: 2019-10-15

## 2019-10-15 DIAGNOSIS — Z86.69 PERSONAL HISTORY OF OTHER DISEASES OF THE NERVOUS SYSTEM AND SENSE ORGANS: ICD-10-CM

## 2019-10-15 DIAGNOSIS — F43.20 ADJUSTMENT DISORDER, UNSPECIFIED: ICD-10-CM

## 2019-10-15 DIAGNOSIS — Z86.19 PERSONAL HISTORY OF OTHER INFECTIOUS AND PARASITIC DISEASES: ICD-10-CM

## 2019-10-15 DIAGNOSIS — B19.20 UNSPECIFIED VIRAL HEPATITIS C W/OUT HEPATIC COMA: ICD-10-CM

## 2019-10-15 DIAGNOSIS — Z86.59 PERSONAL HISTORY OF OTHER MENTAL AND BEHAVIORAL DISORDERS: ICD-10-CM

## 2019-10-15 PROCEDURE — 99203 OFFICE O/P NEW LOW 30 MIN: CPT | Mod: NC

## 2019-10-15 NOTE — HISTORY OF PRESENT ILLNESS
[Other: _____] : [unfilled] [FreeTextEntry1] : Seizure d/o [de-identified] : Patient from [facility]\par 34 Y M with pmh seizure disorder, schizophrenia, adjustment disorder, hepatitis C who presents with breakthrough seizures. Per caregivers, last witnessed seizure at end of September. Caregivers also state that patient's roommate reported that patient possibly had seizure yesterday but patient states that he did not. Currently on depakote 1375mg qd and keppra 1000mg bid.

## 2019-10-15 NOTE — ASSESSMENT
[FreeTextEntry1] : 34 Y M with pmh adjustment disorder, schizophrenia, hepatitis C, and seizure disorder presents with breakthrough seizures.  \par \par #seizure disorder \par - c/w depakote 1375 mg qd \par - increase Keppra to 1250mg bid\par \par

## 2019-10-15 NOTE — END OF VISIT
[] : Resident [FreeTextEntry3] : patient seen and examined\par patient with known epilepsy and breakthrough sz\par cont vpa 1375 q 12. would not adjust this as his psychiatrist is adjusting\par increase keppra to 1250 q 12\par f/u in 4 mos

## 2019-10-15 NOTE — PHYSICAL EXAM
[No Acute Distress] : no acute distress [Well Nourished] : well nourished [Well Developed] : well developed [Normal Sclera/Conjunctiva] : normal sclera/conjunctiva [Well-Appearing] : well-appearing [PERRL] : pupils equal round and reactive to light [EOMI] : extraocular movements intact [Normal Outer Ear/Nose] : the outer ears and nose were normal in appearance [Normal Oropharynx] : the oropharynx was normal [No Lymphadenopathy] : no lymphadenopathy [No JVD] : no jugular venous distention [Supple] : supple [Thyroid Normal, No Nodules] : the thyroid was normal and there were no nodules present [No Respiratory Distress] : no respiratory distress  [No Accessory Muscle Use] : no accessory muscle use [Clear to Auscultation] : lungs were clear to auscultation bilaterally [Normal Rate] : normal rate  [Regular Rhythm] : with a regular rhythm [Normal S1, S2] : normal S1 and S2 [No Murmur] : no murmur heard [No Carotid Bruits] : no carotid bruits [No Abdominal Bruit] : a ~M bruit was not heard ~T in the abdomen [No Varicosities] : no varicosities [No Edema] : there was no peripheral edema [Pedal Pulses Present] : the pedal pulses are present [No Extremity Clubbing/Cyanosis] : no extremity clubbing/cyanosis [No Palpable Aorta] : no palpable aorta [Soft] : abdomen soft [Non Tender] : non-tender [Non-distended] : non-distended [No Masses] : no abdominal mass palpated [No HSM] : no HSM [Normal Posterior Cervical Nodes] : no posterior cervical lymphadenopathy [Normal Bowel Sounds] : normal bowel sounds [No CVA Tenderness] : no CVA  tenderness [No Spinal Tenderness] : no spinal tenderness [Normal Anterior Cervical Nodes] : no anterior cervical lymphadenopathy [No Joint Swelling] : no joint swelling [Grossly Normal Strength/Tone] : grossly normal strength/tone [No Focal Deficits] : no focal deficits [Coordination Grossly Intact] : coordination grossly intact [No Rash] : no rash [Deep Tendon Reflexes (DTR)] : deep tendon reflexes were 2+ and symmetric [Normal Gait] : normal gait [de-identified] : Slow speech, lethargic

## 2019-12-03 ENCOUNTER — APPOINTMENT (OUTPATIENT)
Dept: NEUROLOGY | Facility: CLINIC | Age: 34
End: 2019-12-03

## 2019-12-26 ENCOUNTER — INPATIENT (INPATIENT)
Facility: HOSPITAL | Age: 34
LOS: 4 days | Discharge: HOME | End: 2019-12-31
Attending: INTERNAL MEDICINE | Admitting: INTERNAL MEDICINE
Payer: MEDICAID

## 2019-12-26 VITALS
WEIGHT: 169.98 LBS | DIASTOLIC BLOOD PRESSURE: 69 MMHG | SYSTOLIC BLOOD PRESSURE: 117 MMHG | RESPIRATION RATE: 18 BRPM | OXYGEN SATURATION: 100 % | HEART RATE: 80 BPM | TEMPERATURE: 98 F

## 2019-12-26 DIAGNOSIS — Z79.899 OTHER LONG TERM (CURRENT) DRUG THERAPY: ICD-10-CM

## 2019-12-26 DIAGNOSIS — R56.9 UNSPECIFIED CONVULSIONS: ICD-10-CM

## 2019-12-26 LAB
ALBUMIN SERPL ELPH-MCNC: 4.3 G/DL — SIGNIFICANT CHANGE UP (ref 3.5–5.2)
ALP SERPL-CCNC: 16 U/L — LOW (ref 30–115)
ALT FLD-CCNC: 11 U/L — SIGNIFICANT CHANGE UP (ref 0–41)
ANION GAP SERPL CALC-SCNC: 20 MMOL/L — HIGH (ref 7–14)
AST SERPL-CCNC: 27 U/L — SIGNIFICANT CHANGE UP (ref 0–41)
BASE EXCESS BLDV CALC-SCNC: 3.1 MMOL/L — HIGH (ref -2–2)
BASOPHILS # BLD AUTO: 0.01 K/UL — SIGNIFICANT CHANGE UP (ref 0–0.2)
BASOPHILS NFR BLD AUTO: 0.1 % — SIGNIFICANT CHANGE UP (ref 0–1)
BILIRUB SERPL-MCNC: 0.4 MG/DL — SIGNIFICANT CHANGE UP (ref 0.2–1.2)
BUN SERPL-MCNC: 17 MG/DL — SIGNIFICANT CHANGE UP (ref 10–20)
CA-I SERPL-SCNC: 1.27 MMOL/L — SIGNIFICANT CHANGE UP (ref 1.12–1.3)
CALCIUM SERPL-MCNC: 9.7 MG/DL — SIGNIFICANT CHANGE UP (ref 8.5–10.1)
CHLORIDE SERPL-SCNC: 104 MMOL/L — SIGNIFICANT CHANGE UP (ref 98–110)
CK SERPL-CCNC: 252 U/L — HIGH (ref 0–225)
CO2 SERPL-SCNC: 20 MMOL/L — SIGNIFICANT CHANGE UP (ref 17–32)
CREAT SERPL-MCNC: 1.1 MG/DL — SIGNIFICANT CHANGE UP (ref 0.7–1.5)
EOSINOPHIL # BLD AUTO: 0.02 K/UL — SIGNIFICANT CHANGE UP (ref 0–0.7)
EOSINOPHIL NFR BLD AUTO: 0.3 % — SIGNIFICANT CHANGE UP (ref 0–8)
GAS PNL BLDV: 142 MMOL/L — SIGNIFICANT CHANGE UP (ref 136–145)
GAS PNL BLDV: SIGNIFICANT CHANGE UP
GLUCOSE SERPL-MCNC: 83 MG/DL — SIGNIFICANT CHANGE UP (ref 70–99)
HCO3 BLDV-SCNC: 30 MMOL/L — HIGH (ref 22–29)
HCT VFR BLD CALC: 46.4 % — SIGNIFICANT CHANGE UP (ref 42–52)
HCT VFR BLDA CALC: 43.4 % — SIGNIFICANT CHANGE UP (ref 34–44)
HGB BLD CALC-MCNC: 14.1 G/DL — SIGNIFICANT CHANGE UP (ref 14–18)
HGB BLD-MCNC: 15.5 G/DL — SIGNIFICANT CHANGE UP (ref 14–18)
IMM GRANULOCYTES NFR BLD AUTO: 0.3 % — SIGNIFICANT CHANGE UP (ref 0.1–0.3)
LACTATE BLDV-MCNC: 1.3 MMOL/L — SIGNIFICANT CHANGE UP (ref 0.5–1.6)
LYMPHOCYTES # BLD AUTO: 2.53 K/UL — SIGNIFICANT CHANGE UP (ref 1.2–3.4)
LYMPHOCYTES # BLD AUTO: 37.5 % — SIGNIFICANT CHANGE UP (ref 20.5–51.1)
MAGNESIUM SERPL-MCNC: 2 MG/DL — SIGNIFICANT CHANGE UP (ref 1.8–2.4)
MCHC RBC-ENTMCNC: 31.4 PG — HIGH (ref 27–31)
MCHC RBC-ENTMCNC: 33.4 G/DL — SIGNIFICANT CHANGE UP (ref 32–37)
MCV RBC AUTO: 93.9 FL — SIGNIFICANT CHANGE UP (ref 80–94)
MONOCYTES # BLD AUTO: 0.81 K/UL — HIGH (ref 0.1–0.6)
MONOCYTES NFR BLD AUTO: 12 % — HIGH (ref 1.7–9.3)
NEUTROPHILS # BLD AUTO: 3.35 K/UL — SIGNIFICANT CHANGE UP (ref 1.4–6.5)
NEUTROPHILS NFR BLD AUTO: 49.8 % — SIGNIFICANT CHANGE UP (ref 42.2–75.2)
NRBC # BLD: 0 /100 WBCS — SIGNIFICANT CHANGE UP (ref 0–0)
PCO2 BLDV: 54 MMHG — HIGH (ref 41–51)
PH BLDV: 7.36 — SIGNIFICANT CHANGE UP (ref 7.26–7.43)
PLATELET # BLD AUTO: 143 K/UL — SIGNIFICANT CHANGE UP (ref 130–400)
PO2 BLDV: 36 MMHG — SIGNIFICANT CHANGE UP (ref 20–40)
POTASSIUM BLDV-SCNC: 3.7 MMOL/L — SIGNIFICANT CHANGE UP (ref 3.3–5.6)
POTASSIUM SERPL-MCNC: 5.1 MMOL/L — HIGH (ref 3.5–5)
POTASSIUM SERPL-SCNC: 5.1 MMOL/L — HIGH (ref 3.5–5)
PROT SERPL-MCNC: 7.4 G/DL — SIGNIFICANT CHANGE UP (ref 6–8)
RBC # BLD: 4.94 M/UL — SIGNIFICANT CHANGE UP (ref 4.7–6.1)
RBC # FLD: 12.4 % — SIGNIFICANT CHANGE UP (ref 11.5–14.5)
SAO2 % BLDV: 65 % — SIGNIFICANT CHANGE UP
SODIUM SERPL-SCNC: 144 MMOL/L — SIGNIFICANT CHANGE UP (ref 135–146)
VALPROATE SERPL-MCNC: 58 UG/ML — SIGNIFICANT CHANGE UP (ref 50–100)
WBC # BLD: 6.74 K/UL — SIGNIFICANT CHANGE UP (ref 4.8–10.8)
WBC # FLD AUTO: 6.74 K/UL — SIGNIFICANT CHANGE UP (ref 4.8–10.8)

## 2019-12-26 PROCEDURE — 99221 1ST HOSP IP/OBS SF/LOW 40: CPT

## 2019-12-26 PROCEDURE — 93010 ELECTROCARDIOGRAM REPORT: CPT

## 2019-12-26 PROCEDURE — 99285 EMERGENCY DEPT VISIT HI MDM: CPT

## 2019-12-26 PROCEDURE — 71045 X-RAY EXAM CHEST 1 VIEW: CPT | Mod: 26

## 2019-12-26 PROCEDURE — 70450 CT HEAD/BRAIN W/O DYE: CPT | Mod: 26

## 2019-12-26 RX ORDER — SODIUM CHLORIDE 9 MG/ML
1000 INJECTION INTRAMUSCULAR; INTRAVENOUS; SUBCUTANEOUS ONCE
Refills: 0 | Status: COMPLETED | OUTPATIENT
Start: 2019-12-26 | End: 2019-12-26

## 2019-12-26 RX ORDER — HEPARIN SODIUM 5000 [USP'U]/ML
5000 INJECTION INTRAVENOUS; SUBCUTANEOUS
Refills: 0 | Status: DISCONTINUED | OUTPATIENT
Start: 2019-12-28 | End: 2019-12-31

## 2019-12-26 RX ORDER — DIVALPROEX SODIUM 500 MG/1
250 TABLET, DELAYED RELEASE ORAL
Refills: 0 | Status: DISCONTINUED | OUTPATIENT
Start: 2019-12-26 | End: 2019-12-29

## 2019-12-26 RX ORDER — VALPROIC ACID (AS SODIUM SALT) 250 MG/5ML
250 SOLUTION, ORAL ORAL
Refills: 0 | Status: DISCONTINUED | OUTPATIENT
Start: 2019-12-26 | End: 2019-12-26

## 2019-12-26 RX ORDER — SODIUM CHLORIDE 9 MG/ML
1000 INJECTION INTRAMUSCULAR; INTRAVENOUS; SUBCUTANEOUS
Refills: 0 | Status: DISCONTINUED | OUTPATIENT
Start: 2019-12-26 | End: 2019-12-27

## 2019-12-26 RX ADMIN — SODIUM CHLORIDE 1000 MILLILITER(S): 9 INJECTION INTRAMUSCULAR; INTRAVENOUS; SUBCUTANEOUS at 07:00

## 2019-12-26 NOTE — ED ADULT NURSE NOTE - OBJECTIVE STATEMENT
pt arrives from group home,  roomate  witnessed pt have seizure x4 minutes. pt presents lethargic, responds to verbal ; aox3. States " I had a seizure , I want to go home" IV placed , labs sent. ivf infusing as per order. pt placed on cardiac monitor + continuous pulse ox.  will continue to monitor / assess

## 2019-12-26 NOTE — H&P ADULT - HISTORY OF PRESENT ILLNESS
33yo male arrives in an apparent post-ictal state 35yo male arrives in an apparent post-ictal state. To be assessed for seizures ut currently is not able to give any information

## 2019-12-26 NOTE — ED ADULT TRIAGE NOTE - CHIEF COMPLAINT QUOTE
pt with witnessed seizure x4 minutes  pt's roommate noticed pt having convulsions and called for staff  pt was postictal when EMS arrived   pt remains postictal in triage

## 2019-12-26 NOTE — ED PROVIDER NOTE - NS ED ROS FT
Constitutional: See HPI.  Eyes: No visual changes, eye pain or discharge.  ENMT: No hearing changes, pain, discharge or infections. No neck pain or stiffness.  Cardiac: No chest pain, SOB or edema. No chest pain with exertion.  Respiratory: No cough or respiratory distress.   GI: No nausea, vomiting, diarrhea or abdominal pain.  : No dysuria, frequency or burning.  MS: No myalgia, muscle weakness, joint pain or back pain.  Neuro: +seizures; No headache or weakness. No LOC.  Skin: No skin rash.  Endo: No hx of DM, thyroid disease  Except as documented in HPI, all other review of systems is negative

## 2019-12-26 NOTE — ED PROVIDER NOTE - CLINICAL SUMMARY MEDICAL DECISION MAKING FREE TEXT BOX
33yo M history of seizure d/o on depakote presenting with seizure. Per EMS, witnessed seizure. Upon arrival, pt postictal. labs imaging reviewed. dw neuro, can admit to [facility] for EMU. VSS

## 2019-12-26 NOTE — ED ADULT NURSE REASSESSMENT NOTE - NS ED NURSE REASSESS COMMENT FT1
pt assessed, arousable/postictal cardiac monitoring maintained, VSS, CT Pending, will cont to assess and monitor.

## 2019-12-26 NOTE — ED PROVIDER NOTE - OBJECTIVE STATEMENT
35 y/o male with pmhx of seizures presents s/p seizure. Patient is brought in by EMS after roommate called 911 - patient is currently postictal however is able to state that he had a seizure and is on depakote for seizure prophylaxis. Patient denies recent illness, fevers/chills, sob, chest pain, n/v/d, dysuria, trauma/fall/head trauma, alcohol/drug use.

## 2019-12-26 NOTE — ED PROVIDER NOTE - ATTENDING CONTRIBUTION TO CARE
Patient is BIB EMS for evaluation of seizure, patient is post-ictal and not able to give much details, as per EMS, patient had witnessed seizure, no falls/injuries.   vitals noted  no externals signs of trauma noted  lungs: CTA, no crackles  abd: +BS, NT, ND, soft  A/P: Seizure   labs, reevaluation  will check valproic acid level and obtain more information on patient seizure medications when improved from post-ictal.

## 2019-12-26 NOTE — H&P ADULT - NSHPLABSRESULTS_GEN_ALL_CORE
15.5   6.74  )-----------( 143      ( 26 Dec 2019 06:58 )             46.4     12-26    144  |  104  |  17  ----------------------------<  83  5.1<H>   |  20  |  1.1    Ca    9.7      26 Dec 2019 06:58  Mg     2.0     12-26    TPro  7.4  /  Alb  4.3  /  TBili  0.4  /  DBili  x   /  AST  27  /  ALT  11  /  AlkPhos  16<L>  12-26              Lactate Trend    CARDIAC MARKERS ( 26 Dec 2019 06:58 )  x     / x     / 252 U/L / x     / x          CAPILLARY BLOOD GLUCOSE

## 2019-12-26 NOTE — ED PROVIDER NOTE - PHYSICAL EXAMINATION
CONSTITUTIONAL: Well-developed; well-nourished; in no acute distress.   SKIN: warm, dry  HEAD: Normocephalic; atraumatic.  EYES: no conj injection  ENT: +small bite sierra noted to inner lower lip, unable to assess oral mucosa due to patient not following commands; No nasal discharge;   NECK: Supple; non tender.  CARD: S1, S2 normal; no murmurs, gallops, or rubs. Regular rate and rhythm.   RESP: No wheezes, rales or rhonchi.  ABD: soft ntnd  EXT: No clubbing, cyanosis or edema.   NEURO: Post ictal; oriented to place, person, and time; not opening eyes spontaneously; unable to follow commands

## 2019-12-26 NOTE — ED ADULT NURSE NOTE - NSIMPLEMENTINTERV_GEN_ALL_ED
Implemented All Fall with Harm Risk Interventions:  [place] to call system. Call bell, personal items and telephone within reach. Instruct patient to call for assistance. Room bathroom lighting operational. Non-slip footwear when patient is off stretcher. Physically safe environment: no spills, clutter or unnecessary equipment. Stretcher in lowest position, wheels locked, appropriate side rails in place. Provide visual cue, wrist band, yellow gown, etc. Monitor gait and stability. Monitor for mental status changes and reorient to person, place, and time. Review medications for side effects contributing to fall risk. Reinforce activity limits and safety measures with patient and family. Provide visual clues: red socks.

## 2019-12-27 ENCOUNTER — TRANSCRIPTION ENCOUNTER (OUTPATIENT)
Age: 34
End: 2019-12-27

## 2019-12-27 LAB
ALBUMIN SERPL ELPH-MCNC: 3.9 G/DL — SIGNIFICANT CHANGE UP (ref 3.5–5.2)
ALP SERPL-CCNC: 22 U/L — LOW (ref 30–115)
ALT FLD-CCNC: 8 U/L — SIGNIFICANT CHANGE UP (ref 0–41)
ANION GAP SERPL CALC-SCNC: 9 MMOL/L — SIGNIFICANT CHANGE UP (ref 7–14)
AST SERPL-CCNC: 13 U/L — SIGNIFICANT CHANGE UP (ref 0–41)
BILIRUB SERPL-MCNC: 0.4 MG/DL — SIGNIFICANT CHANGE UP (ref 0.2–1.2)
BUN SERPL-MCNC: 13 MG/DL — SIGNIFICANT CHANGE UP (ref 10–20)
CALCIUM SERPL-MCNC: 9.3 MG/DL — SIGNIFICANT CHANGE UP (ref 8.5–10.1)
CHLORIDE SERPL-SCNC: 104 MMOL/L — SIGNIFICANT CHANGE UP (ref 98–110)
CO2 SERPL-SCNC: 26 MMOL/L — SIGNIFICANT CHANGE UP (ref 17–32)
CREAT SERPL-MCNC: 1 MG/DL — SIGNIFICANT CHANGE UP (ref 0.7–1.5)
GLUCOSE SERPL-MCNC: 127 MG/DL — HIGH (ref 70–99)
HCT VFR BLD CALC: 42.2 % — SIGNIFICANT CHANGE UP (ref 42–52)
HGB BLD-MCNC: 14.5 G/DL — SIGNIFICANT CHANGE UP (ref 14–18)
MCHC RBC-ENTMCNC: 31.3 PG — HIGH (ref 27–31)
MCHC RBC-ENTMCNC: 34.4 G/DL — SIGNIFICANT CHANGE UP (ref 32–37)
MCV RBC AUTO: 90.9 FL — SIGNIFICANT CHANGE UP (ref 80–94)
NRBC # BLD: 0 /100 WBCS — SIGNIFICANT CHANGE UP (ref 0–0)
PLATELET # BLD AUTO: 128 K/UL — LOW (ref 130–400)
POTASSIUM SERPL-MCNC: 3.7 MMOL/L — SIGNIFICANT CHANGE UP (ref 3.5–5)
POTASSIUM SERPL-SCNC: 3.7 MMOL/L — SIGNIFICANT CHANGE UP (ref 3.5–5)
PROT SERPL-MCNC: 6.7 G/DL — SIGNIFICANT CHANGE UP (ref 6–8)
RBC # BLD: 4.64 M/UL — LOW (ref 4.7–6.1)
RBC # FLD: 11.9 % — SIGNIFICANT CHANGE UP (ref 11.5–14.5)
SODIUM SERPL-SCNC: 139 MMOL/L — SIGNIFICANT CHANGE UP (ref 135–146)
WBC # BLD: 6.68 K/UL — SIGNIFICANT CHANGE UP (ref 4.8–10.8)
WBC # FLD AUTO: 6.68 K/UL — SIGNIFICANT CHANGE UP (ref 4.8–10.8)

## 2019-12-27 PROCEDURE — 99251: CPT

## 2019-12-27 PROCEDURE — 99233 SBSQ HOSP IP/OBS HIGH 50: CPT

## 2019-12-27 RX ORDER — CHOLECALCIFEROL (VITAMIN D3) 125 MCG
1000 CAPSULE ORAL DAILY
Refills: 0 | Status: DISCONTINUED | OUTPATIENT
Start: 2019-12-27 | End: 2019-12-31

## 2019-12-27 RX ORDER — LEVETIRACETAM 250 MG/1
1000 TABLET, FILM COATED ORAL
Refills: 0 | Status: DISCONTINUED | OUTPATIENT
Start: 2019-12-27 | End: 2019-12-31

## 2019-12-27 RX ORDER — CLONAZEPAM 1 MG
1 TABLET ORAL
Refills: 0 | Status: DISCONTINUED | OUTPATIENT
Start: 2019-12-27 | End: 2019-12-30

## 2019-12-27 RX ORDER — RISPERIDONE 4 MG/1
37.5 TABLET ORAL ONCE
Refills: 0 | Status: DISCONTINUED | OUTPATIENT
Start: 2020-01-01 | End: 2019-12-31

## 2019-12-27 RX ORDER — CLONAZEPAM 1 MG
1 TABLET ORAL
Qty: 0 | Refills: 0 | DISCHARGE
Start: 2019-12-27

## 2019-12-27 RX ORDER — LEVETIRACETAM 250 MG/1
250 TABLET, FILM COATED ORAL
Refills: 0 | Status: DISCONTINUED | OUTPATIENT
Start: 2019-12-27 | End: 2019-12-30

## 2019-12-27 RX ORDER — DIVALPROEX SODIUM 500 MG/1
1000 TABLET, DELAYED RELEASE ORAL
Refills: 0 | Status: DISCONTINUED | OUTPATIENT
Start: 2019-12-27 | End: 2019-12-31

## 2019-12-27 RX ADMIN — DIVALPROEX SODIUM 250 MILLIGRAM(S): 500 TABLET, DELAYED RELEASE ORAL at 05:39

## 2019-12-27 RX ADMIN — DIVALPROEX SODIUM 1000 MILLIGRAM(S): 500 TABLET, DELAYED RELEASE ORAL at 17:32

## 2019-12-27 RX ADMIN — Medication 1000 UNIT(S): at 17:31

## 2019-12-27 RX ADMIN — LEVETIRACETAM 1000 MILLIGRAM(S): 250 TABLET, FILM COATED ORAL at 17:31

## 2019-12-27 RX ADMIN — LEVETIRACETAM 250 MILLIGRAM(S): 250 TABLET, FILM COATED ORAL at 17:32

## 2019-12-27 RX ADMIN — Medication 1 MILLIGRAM(S): at 17:31

## 2019-12-27 RX ADMIN — SODIUM CHLORIDE 50 MILLILITER(S): 9 INJECTION INTRAMUSCULAR; INTRAVENOUS; SUBCUTANEOUS at 00:03

## 2019-12-27 RX ADMIN — DIVALPROEX SODIUM 250 MILLIGRAM(S): 500 TABLET, DELAYED RELEASE ORAL at 17:32

## 2019-12-27 NOTE — CONSULT NOTE ADULT - ATTENDING COMMENTS
Attempted to review history with patient without significant clarity. He states previous EEGs have been normal. He cannot recall when last seizure occurred. Purpose of admission is for VEEG to try to characterize these events and determine if medication adjustment required.

## 2019-12-27 NOTE — DISCHARGE NOTE PROVIDER - PROVIDER TOKENS
FREE:[LAST:[louisa],FIRST:[Nayeli],PHONE:[(   )    -],FAX:[(   )    -]],PROVIDER:[TOKEN:[[id number]]] PROVIDER:[TOKEN:[[id number]],SCHEDULEDAPPT:[01/14/2020]],FREE:[LAST:[louisa],FIRST:[Nayeli],PHONE:[(   )    -],FAX:[(   )    -]]

## 2019-12-27 NOTE — CONSULT NOTE ADULT - ASSESSMENT
Assessment:  This is a 34 year old right handed male with PMH of schizophrenia, epilepsy, Hep C and adjustment disorder presented to the ER for breakthrough seizure.        Discussed with Dr. Baker    Plan:   - VEEG for further characterization and treatment plan  - Seizure precautions  - Continue Keppra 1250mg q12hrs for now  - Depakore 1250mg po q 12hrs  - Check Mag, Keppra and Depakote levels(ordered)  - Ativan 2mg IV PRN for generalized tonic-clonic seizure lasting longer than 2 minutes, or two consecutive seizures without return to baseline in-between (ordered)  - Keep Mg above 2    Plan discussed with patient.    Discussed with PA.

## 2019-12-27 NOTE — CONSULT NOTE ADULT - SUBJECTIVE AND OBJECTIVE BOX
Neurology/Epilepsy Consult:    Patient is a 34y old right handed male presented to the ER yesterday for a breakthrough seizure.       HPI: History obtained from patient, EMR and  at [facility] [phone]. Patient was transferred to EMU from [facility] for seizure evaluation.   Patient is a resident at [facility]. He is 34 year old right handed male with pmh seizure disorder, schizophrenia, adjustment disorder, hepatitis C who presented with a breakthrough seizure yesterday. Pts roommate informed the nurses that pt has making noises and 911 was called. Unable to obtain description of a seizure from pt or staff. Pt states usually doesnt remember anything and never has any warning signs before the seizure.  at his residence stated that he has many seizures and often refuses to go to the hospital. As per EMR pt had 3 breakthrough seizures for which he came to the ER for but never admitted. Pt goes to the Neuro clinic. Last seen there in October and his Keppra dose was increased to 1250 mg po q 12hrs. Pt is also on Depakote 1375 mg po q 12hrs.   Pt is a poor historian and unable to provide information about when he was diagnosed and who diagnosed him.    PAST MEDICAL & SURGICAL HISTORY:  Schizophrenia  Hepatitis C  Adjustment disorder  Epilepsy        FAMILY HISTORY:  unknown      Social History:   Resident at [facility] [place]        Allergy:  NKDA        MEDICATIONS  (STANDING):  clonazePAM  Tablet 1 milliGRAM(s) Oral two times a day  diVALproex  milliGRAM(s) Oral two times a day  diVALproex DR 1000 milliGRAM(s) Oral two times a day  levETIRAcetam 1000 milliGRAM(s) Oral two times a day  levETIRAcetam 250 milliGRAM(s) Oral two times a day  sodium chloride 0.9%. 1000 milliLiter(s) (50 mL/Hr) IV Continuous <Continuous>    MEDICATIONS  (PRN):  LORazepam   Injectable 2 milliGRAM(s) IV Push three times a day PRN generalized tonic-clonic seizure lasting longer than 2 minutes      Vital Signs Last 24 Hrs  T(C): 36.8 (27 Dec 2019 14:39), Max: 36.8 (27 Dec 2019 14:39)  T(F): 98.2 (27 Dec 2019 14:39), Max: 98.2 (27 Dec 2019 14:39)  HR: 77 (27 Dec 2019 14:39) (43 - 77)  BP: 126/60 (27 Dec 2019 14:39) (91/53 - 126/60)  BP(mean): --  RR: 18 (27 Dec 2019 14:39) (16 - 18)  SpO2: --    Neurologic Examination:  General:  Appearance is consistent with chronologic age.  No abnormal facies.   General: The patient is oriented to person, place, time.  Follows simple commands. Slow to respond.   Cranial nerves: EOMI w/o nystagmus, skew or reported double vision.  PERRL.  No ptosis/weakness of eyelid closure.  Facial sensation is normal with normal bite.  No facial asymmetry.  Hearing grossly intact b/l.  Palate elevates midline.  Tongue midline.  Motor examination:   Normal tone, bulk and range of motion.  No tenderness, twitching, tremors or involuntary movements.  Formal Muscle Strength Testin/5 UE; 5/5 LE.  No observable drift.  Reflexes:   2+ b/l   Sensory examination:   Intact to light touch and pinprick, pain.  Cerebellum:   FTN/HKS intact with normal KALIE in all limbs.  No dysmetria or dysdiadokinesia.  Gait narrow based and normal.      Labs:   CBC Full  -  ( 27 Dec 2019 08:57 )  WBC Count : 6.68 K/uL  RBC Count : 4.64 M/uL  Hemoglobin : 14.5 g/dL  Hematocrit : 42.2 %  Platelet Count - Automated : 128 K/uL  Mean Cell Volume : 90.9 fL  Mean Cell Hemoglobin : 31.3 pg  Mean Cell Hemoglobin Concentration : 34.4 g/dL  Auto Neutrophil # : x  Auto Lymphocyte # : x  Auto Monocyte # : x  Auto Eosinophil # : x  Auto Basophil # : x  Auto Neutrophil % : x  Auto Lymphocyte % : x  Auto Monocyte % : x  Auto Eosinophil % : x  Auto Basophil % : x      139  |  104  |  13  ----------------------------<  127<H>  3.7   |  26  |  1.0    Ca    9.3      27 Dec 2019 08:57  Mg     2.0         TPro  6.7  /  Alb  3.9  /  TBili  0.4  /  DBili  x   /  AST  13  /  ALT  8   /  AlkPhos  22<L>        LIVER FUNCTIONS - ( 27 Dec 2019 08:57 )  Alb: 3.9 g/dL / Pro: 6.7 g/dL / ALK PHOS: 22 U/L / ALT: 8 U/L / AST: 13 U/L / GGT: x             Neuroimaging:  EXAM:  CT BRAIN            PROCEDURE DATE:  2019            INTERPRETATION:  Clinical History / Reason for exam: Seizure    Technique: Noncontrast head CT.  Contiguous unenhanced CT axial images of the head from the base to the vertex with coronal and sagittal reformats.    Comparison: CT head 2019    Findings:    The ventricles and cortical sulci are normal in size and configuration.   There is no acute intracranial hemorrhage, extra-axial fluid collection or midline shift.  Gray-white matter differentiation is maintained.     The visualized paranasal sinuses and mastoids are well-aerated.    IMPRESSION:     No evidence of acute intracranial pathology. Stable exam since 2019.    No previous EEGs found

## 2019-12-27 NOTE — DISCHARGE NOTE PROVIDER - CARE PROVIDER_API CALL
Nayeli jasso  Phone: (   )    -  Fax: (   )    -  Follow Up Time:     Aguilar Barraza)  Neurology  [address] [place] [address], [place] [address]  Phone: [phone]  Fax: [phone]  Follow Up Time: Aguilar Barraza)  Neurology  [address] [place] [address], [address]  [place] [address]  Phone: [phone]  Fax: [phone]  Scheduled Appointment: 01/14/2020    Nayeli jasso  Phone: (   )    -  Fax: (   )    -  Follow Up Time:

## 2019-12-27 NOTE — PROGRESS NOTE ADULT - SUBJECTIVE AND OBJECTIVE BOX
VAN WOMACK  34y  Male      Patient is a 34y old  Male who presents with a chief complaint of Seizure (27 Dec 2019 14:46)      INTERVAL HPI/OVERNIGHT EVENTS:  Patient seen and examined earlier this morning.  Lying comfortably in bed on VEEG.  No complaints.        REVIEW OF SYSTEMS:  CONSTITUTIONAL: No fever, weight loss, or fatigue  EYES: No eye pain, visual disturbances, or discharge  ENMT:  No difficulty hearing, tinnitus, vertigo; No sinus or throat pain  NECK: No pain or stiffness  RESPIRATORY: No cough, wheezing, chills or hemoptysis; No shortness of breath  CARDIOVASCULAR: No chest pain, palpitations, dizziness, or leg swelling  GASTROINTESTINAL: No abdominal or epigastric pain. No nausea, vomiting, or hematemesis; No diarrhea or constipation. No melena or hematochezia.  GENITOURINARY: No dysuria, frequency, hematuria, or incontinence  NEUROLOGICAL: No headaches, memory loss, loss of strength, numbness, or tremors  SKIN: No itching, burning, rashes, or lesions   LYMPH NODES: No enlarged glands  ENDOCRINE: No heat or cold intolerance; No hair loss  MUSCULOSKELETAL: No joint pain or swelling; No muscle, back, or extremity pain  PSYCHIATRIC: No depression, anxiety, mood swings, or difficulty sleeping  HEME/LYMPH: No easy bruising, or bleeding gums  ALLERY AND IMMUNOLOGIC: No hives or eczema    T(C): 36.8 (12-27-19 @ 14:39), Max: 36.8 (12-27-19 @ 14:39)  HR: 77 (12-27-19 @ 14:39) (43 - 77)  BP: 126/60 (12-27-19 @ 14:39) (91/53 - 126/60)  RR: 18 (12-27-19 @ 14:39) (16 - 18)  SpO2: --    PHYSICAL EXAM:  GENERAL: NAD, well-groomed, well-developed; slow speech  HEAD:  Atraumatic, Normocephalic  EYES: EOMI, PERRLA, conjunctiva and sclera clear  ENMT: No tonsillar erythema, exudates, or enlargement; Moist mucous membranes, Good dentition, No lesions  NECK: Supple, No JVD, Normal thyroid  NERVOUS SYSTEM:  Alert & Oriented X3, Good concentration; Motor Strength 5/5 B/L upper and lower extremities; DTRs 2+ intact and symmetric  CHEST/LUNG: Clear to percussion bilaterally; No rales, rhonchi, wheezing, or rubs  HEART: Regular rate and rhythm; No murmurs, rubs, or gallops  ABDOMEN: Soft, Nontender, Nondistended; Bowel sounds present  EXTREMITIES:  2+ Peripheral Pulses, No clubbing, cyanosis, or edema  LYMPH: No lymphadenopathy noted  SKIN: No rashes or lesions    Consultant(s) Notes Reviewed:  [x ] YES  [ ] NO  Care Discussed with Consultants/Other Providers [ x] YES  [ ] NO    LAB:                        14.5   6.68  )-----------( 128      ( 27 Dec 2019 08:57 )             42.2     12-27    139  |  104  |  13  ----------------------------<  127<H>  3.7   |  26  |  1.0    Ca    9.3      27 Dec 2019 08:57  Mg     2.0     12-26    TPro  6.7  /  Alb  3.9  /  TBili  0.4  /  DBili  x   /  AST  13  /  ALT  8   /  AlkPhos  22<L>  12-27    LIVER FUNCTIONS - ( 27 Dec 2019 08:57 )  Alb: 3.9 g/dL / Pro: 6.7 g/dL / ALK PHOS: 22 U/L / ALT: 8 U/L / AST: 13 U/L / GGT: x           CARDIAC MARKERS ( 26 Dec 2019 06:58 )  x     / x     / 252 U/L / x     / x            Drug Dosing Weight  Height (cm): 167.6 (26 Dec 2019 15:57)  Weight (kg): 73.1 (26 Dec 2019 15:57)  BMI (kg/m2): 26 (26 Dec 2019 15:57)  BSA (m2): 1.82 (26 Dec 2019 15:57)      RADIOLOGY & ADDITIONAL TESTS:  Imaging Personally Reviewed:  [x] YES  [ ] NO    HEALTH ISSUES - PROBLEM Dx:  Medication management: Medication management  Seizure: Seizure        MEDS:  cholecalciferol 1000 Unit(s) Oral daily  clonazePAM  Tablet 1 milliGRAM(s) Oral two times a day  diVALproex  milliGRAM(s) Oral two times a day  diVALproex DR 1000 milliGRAM(s) Oral two times a day  levETIRAcetam 1000 milliGRAM(s) Oral two times a day  levETIRAcetam 250 milliGRAM(s) Oral two times a day  LORazepam   Injectable 2 milliGRAM(s) IV Push three times a day PRN  sodium chloride 0.9%. 1000 milliLiter(s) IV Continuous <Continuous>

## 2019-12-27 NOTE — DISCHARGE NOTE PROVIDER - NSDCCPCAREPLAN_GEN_ALL_CORE_FT
PRINCIPAL DISCHARGE DIAGNOSIS  Diagnosis: Seizure  Assessment and Plan of Treatment: You were admitted for elective V-EEG monitoring and were seen by neurology.  Your medications.... PRINCIPAL DISCHARGE DIAGNOSIS  Diagnosis: Seizure  Assessment and Plan of Treatment: You were admitted for elective V-EEG monitoring and were seen by neurology.  Take medication as prescribed  follow up with neurology PRINCIPAL DISCHARGE DIAGNOSIS  Diagnosis: Seizure  Assessment and Plan of Treatment: -   - You were admitted for elective V-EEG monitoring and were seen by neurology.  Take medication as prescribed. Follow up with neurology on January 14th.  - Avoid driving, avoid operating machinery, avoid working in heights, avoid unsupervised swimming, avoid sharp objects, avoid hot water temperature; no seizure provoking OTC stimulants till cleared by neurologist.

## 2019-12-27 NOTE — DISCHARGE NOTE PROVIDER - NSDCMRMEDTOKEN_GEN_ALL_CORE_FT
clonazePAM 1 mg oral tablet: 1 tab(s) orally 2 times a day  RisperDAL Consta 37.5 mg/2 weeks intramuscular injection, extended release:   Vitamin D3 1000 intl units (25 mcg) oral tablet: 1 tab(s) orally once a day clonazePAM 1 mg oral tablet: 1 tab(s) orally 2 times a day  divalproex sodium 500 mg oral delayed release tablet: 2 tab(s) orally 2 times a day  levETIRAcetam 1000 mg oral tablet: 1 tab(s) orally 2 times a day  RisperDAL Consta 37.5 mg/2 weeks intramuscular injection, extended release:   Vitamin D3 1000 intl units (25 mcg) oral tablet: 1 tab(s) orally once a day

## 2019-12-27 NOTE — DISCHARGE NOTE PROVIDER - CARE PROVIDERS DIRECT ADDRESSES
,DirectAddress_Unknown,[email or web address],DirectAddress_Unknown

## 2019-12-27 NOTE — DISCHARGE NOTE PROVIDER - HOSPITAL COURSE
to be completed by attending 34 year old with schizophrenia presents with a breakthrough seizure     he was placed on veeg    aed's were adjusted as per neurology    patient needs close follow up with neurology    He is stable for d/c to [facility] 34 year old with schizophrenia presents with a breakthrough seizure     he was placed on veeg for several days    aed's were adjusted as per neurology due to lethargy     patient needs close follow up with neurology upon d/c    He is stable for d/c to [facility]

## 2019-12-27 NOTE — PROGRESS NOTE ADULT - ASSESSMENT
1. Breakthrough seizure  -place on VEEG  -seizure precautions  -AED's as per neurology  -seizure precautions  -ativan for seizure lasting over 2 min or two consecutive seizures      2. Schizophrenia  -continue current tx  -return to [facility] upon d/c    Progress Note Handoff  Pending Consults: none  Pending Tests: veeg  Pending Results: veeg  Family Discussion: discussed veeg and d/c to [facility] when cleared by neuro - pt in agreement  Disposition: Home_____/SNF______/Other__[facility]___/Unknown at this time_____    Please call me with any questions at extension [phone]

## 2019-12-28 LAB
MAGNESIUM SERPL-MCNC: 1.8 MG/DL — SIGNIFICANT CHANGE UP (ref 1.8–2.4)
VALPROATE SERPL-MCNC: 114 UG/ML — CRITICAL HIGH (ref 50–100)

## 2019-12-28 PROCEDURE — 99233 SBSQ HOSP IP/OBS HIGH 50: CPT

## 2019-12-28 PROCEDURE — 95951: CPT | Mod: 26

## 2019-12-28 PROCEDURE — 99231 SBSQ HOSP IP/OBS SF/LOW 25: CPT | Mod: 25

## 2019-12-28 RX ADMIN — DIVALPROEX SODIUM 1000 MILLIGRAM(S): 500 TABLET, DELAYED RELEASE ORAL at 05:29

## 2019-12-28 RX ADMIN — Medication 1 MILLIGRAM(S): at 17:51

## 2019-12-28 RX ADMIN — LEVETIRACETAM 250 MILLIGRAM(S): 250 TABLET, FILM COATED ORAL at 05:29

## 2019-12-28 RX ADMIN — LEVETIRACETAM 250 MILLIGRAM(S): 250 TABLET, FILM COATED ORAL at 17:49

## 2019-12-28 RX ADMIN — DIVALPROEX SODIUM 1000 MILLIGRAM(S): 500 TABLET, DELAYED RELEASE ORAL at 17:50

## 2019-12-28 RX ADMIN — Medication 1000 UNIT(S): at 14:31

## 2019-12-28 RX ADMIN — HEPARIN SODIUM 5000 UNIT(S): 5000 INJECTION INTRAVENOUS; SUBCUTANEOUS at 17:53

## 2019-12-28 RX ADMIN — LEVETIRACETAM 1000 MILLIGRAM(S): 250 TABLET, FILM COATED ORAL at 17:49

## 2019-12-28 RX ADMIN — DIVALPROEX SODIUM 250 MILLIGRAM(S): 500 TABLET, DELAYED RELEASE ORAL at 05:29

## 2019-12-28 RX ADMIN — LEVETIRACETAM 1000 MILLIGRAM(S): 250 TABLET, FILM COATED ORAL at 05:29

## 2019-12-28 RX ADMIN — Medication 1 MILLIGRAM(S): at 05:29

## 2019-12-28 RX ADMIN — DIVALPROEX SODIUM 250 MILLIGRAM(S): 500 TABLET, DELAYED RELEASE ORAL at 17:50

## 2019-12-28 NOTE — PROGRESS NOTE ADULT - ASSESSMENT
34 year old male history of Epilepsy though unclear type of Epilepsy or seizure frequency as patient is poor historian. No records available to confirm diagnosis. He is admitted for management of break through seizure. EEG overnight nondiagnostic so will continue VEEG. Will continue current medication doses for now.

## 2019-12-28 NOTE — PROGRESS NOTE ADULT - ASSESSMENT
1. Breakthrough seizure  -continue VEEG as per neuro  -seizure precautions  -AED's as per neurology  -ativan for seizure lasting over 2 min or two consecutive seizures      2. Schizophrenia  -continue current tx  -return to [facility] upon d/c - will not accept pt over the weekend as per case management     Progress Note Handoff  Pending Consults: none  Pending Tests: veeg  Pending Results: veeg  Family Discussion: discussed veeg and d/c to [facility] when cleared by neuro - pt in agreement  Disposition: Home_____/SNF______/Other__[facility]___/Unknown at this time_____    Please call me with any questions at extension [phone]

## 2019-12-28 NOTE — PROGRESS NOTE ADULT - SUBJECTIVE AND OBJECTIVE BOX
VAN WOMACK  34y  Male      Patient is a 34y old  Male who presents with a chief complaint of Seizure (27 Dec 2019 14:46)      INTERVAL HPI/OVERNIGHT EVENTS:  Patient seen and examined earlier this morning.  Lying comfortably in bed on VEEG.  No complaints.    Neurology wants to continue VEEG and AED's at current doses      REVIEW OF SYSTEMS:  CONSTITUTIONAL: No fever, weight loss, or fatigue  EYES: No eye pain, visual disturbances, or discharge  ENMT:  No difficulty hearing, tinnitus, vertigo; No sinus or throat pain  NECK: No pain or stiffness  RESPIRATORY: No cough, wheezing, chills or hemoptysis; No shortness of breath  CARDIOVASCULAR: No chest pain, palpitations, dizziness, or leg swelling  GASTROINTESTINAL: No abdominal or epigastric pain. No nausea, vomiting, or hematemesis; No diarrhea or constipation. No melena or hematochezia.  GENITOURINARY: No dysuria, frequency, hematuria, or incontinence  NEUROLOGICAL: No headaches, memory loss, loss of strength, numbness, or tremors  SKIN: No itching, burning, rashes, or lesions   LYMPH NODES: No enlarged glands  ENDOCRINE: No heat or cold intolerance; No hair loss  MUSCULOSKELETAL: No joint pain or swelling; No muscle, back, or extremity pain  PSYCHIATRIC: No depression, anxiety, mood swings, or difficulty sleeping  HEME/LYMPH: No easy bruising, or bleeding gums  ALLERY AND IMMUNOLOGIC: No hives or eczema    Vital Signs Last 24 Hrs  T(C): 36.1 (28 Dec 2019 04:50), Max: 36.8 (27 Dec 2019 14:39)  T(F): 97 (28 Dec 2019 04:50), Max: 98.2 (27 Dec 2019 14:39)  HR: 46 (28 Dec 2019 04:50) (46 - 77)  BP: 123/70 (28 Dec 2019 04:50) (107/62 - 126/60)  BP(mean): --  RR: 16 (28 Dec 2019 04:50) (16 - 18)  SpO2: --    PHYSICAL EXAM:  GENERAL: NAD, well-groomed, well-developed; slow speech  HEAD:  Atraumatic, Normocephalic  EYES: EOMI, PERRLA, conjunctiva and sclera clear  ENMT: No tonsillar erythema, exudates, or enlargement; Moist mucous membranes, Good dentition, No lesions  NECK: Supple, No JVD, Normal thyroid  NERVOUS SYSTEM:  Alert & Oriented X3, Good concentration; Motor Strength 5/5 B/L upper and lower extremities; DTRs 2+ intact and symmetric  CHEST/LUNG: Clear to percussion bilaterally; No rales, rhonchi, wheezing, or rubs  HEART: Regular rate and rhythm; No murmurs, rubs, or gallops  ABDOMEN: Soft, Nontender, Nondistended; Bowel sounds present  EXTREMITIES:  2+ Peripheral Pulses, No clubbing, cyanosis, or edema  LYMPH: No lymphadenopathy noted  SKIN: No rashes or lesions    Consultant(s) Notes Reviewed:  [x ] YES  [ ] NO  Care Discussed with Consultants/Other Providers [ x] YES  [ ] NO    LAB:                        14.5   6.68  )-----------( 128      ( 27 Dec 2019 08:57 )             42.2     12-27    139  |  104  |  13  ----------------------------<  127<H>  3.7   |  26  |  1.0    Ca    9.3      27 Dec 2019 08:57  Mg     2.0     12-26    TPro  6.7  /  Alb  3.9  /  TBili  0.4  /  DBili  x   /  AST  13  /  ALT  8   /  AlkPhos  22<L>  12-27    LIVER FUNCTIONS - ( 27 Dec 2019 08:57 )  Alb: 3.9 g/dL / Pro: 6.7 g/dL / ALK PHOS: 22 U/L / ALT: 8 U/L / AST: 13 U/L / GGT: x           CARDIAC MARKERS ( 26 Dec 2019 06:58 )  x     / x     / 252 U/L / x     / x            Drug Dosing Weight  Height (cm): 167.6 (26 Dec 2019 15:57)  Weight (kg): 73.1 (26 Dec 2019 15:57)  BMI (kg/m2): 26 (26 Dec 2019 15:57)  BSA (m2): 1.82 (26 Dec 2019 15:57)      RADIOLOGY & ADDITIONAL TESTS:  Imaging Personally Reviewed:  [x] YES  [ ] NO    HEALTH ISSUES - PROBLEM Dx:  Medication management: Medication management  Seizure: Seizure      MEDICATIONS  (STANDING):  cholecalciferol 1000 Unit(s) Oral daily  clonazePAM  Tablet 1 milliGRAM(s) Oral two times a day  diVALproex  milliGRAM(s) Oral two times a day  diVALproex DR 1000 milliGRAM(s) Oral two times a day  heparin  Injectable 5000 Unit(s) SubCutaneous two times a day  levETIRAcetam 1000 milliGRAM(s) Oral two times a day  levETIRAcetam 250 milliGRAM(s) Oral two times a day    MEDICATIONS  (PRN):  LORazepam   Injectable 2 milliGRAM(s) IV Push three times a day PRN generalized tonic-clonic seizure lasting longer than 2 minutes

## 2019-12-28 NOTE — PROGRESS NOTE ADULT - SUBJECTIVE AND OBJECTIVE BOX
[id number]  VAN WOMACK  34y    Male    Allergies: No Known Allergies      Medications: cholecalciferol 1000 Unit(s) Oral daily  clonazePAM  Tablet 1 milliGRAM(s) Oral two times a day  diVALproex  milliGRAM(s) Oral two times a day  diVALproex DR 1000 milliGRAM(s) Oral two times a day  heparin  Injectable 5000 Unit(s) SubCutaneous two times a day  levETIRAcetam 1000 milliGRAM(s) Oral two times a day  levETIRAcetam 250 milliGRAM(s) Oral two times a day  LORazepam   Injectable 2 milliGRAM(s) IV Push three times a day PRN      T(C): 36.1 (12-28-19 @ 04:50), Max: 36.8 (12-27-19 @ 14:39)  HR: 46 (12-28-19 @ 04:50) (46 - 77)  BP: 123/70 (12-28-19 @ 04:50) (107/62 - 126/60)  RR: 16 (12-28-19 @ 04:50) (16 - 18)    No events overnight.    VEEG shows well organized and normal background. There is occassional frontal slowing. There are no epileptiform discharges or electrographic seizures.    PHYSICAL EXAM:    Awakens easily. Slow ledy of speech. Follows directions well.     Neurological: CN II-XII in tact. No nystagmus. Motor full strength x4.

## 2019-12-29 PROCEDURE — 95951: CPT | Mod: 26

## 2019-12-29 PROCEDURE — 99233 SBSQ HOSP IP/OBS HIGH 50: CPT

## 2019-12-29 PROCEDURE — 99231 SBSQ HOSP IP/OBS SF/LOW 25: CPT | Mod: 25

## 2019-12-29 RX ADMIN — DIVALPROEX SODIUM 1000 MILLIGRAM(S): 500 TABLET, DELAYED RELEASE ORAL at 17:10

## 2019-12-29 RX ADMIN — Medication 1 MILLIGRAM(S): at 05:00

## 2019-12-29 RX ADMIN — DIVALPROEX SODIUM 250 MILLIGRAM(S): 500 TABLET, DELAYED RELEASE ORAL at 05:00

## 2019-12-29 RX ADMIN — DIVALPROEX SODIUM 1000 MILLIGRAM(S): 500 TABLET, DELAYED RELEASE ORAL at 05:00

## 2019-12-29 RX ADMIN — LEVETIRACETAM 250 MILLIGRAM(S): 250 TABLET, FILM COATED ORAL at 17:10

## 2019-12-29 RX ADMIN — LEVETIRACETAM 1000 MILLIGRAM(S): 250 TABLET, FILM COATED ORAL at 17:10

## 2019-12-29 RX ADMIN — HEPARIN SODIUM 5000 UNIT(S): 5000 INJECTION INTRAVENOUS; SUBCUTANEOUS at 05:00

## 2019-12-29 RX ADMIN — LEVETIRACETAM 1000 MILLIGRAM(S): 250 TABLET, FILM COATED ORAL at 05:00

## 2019-12-29 RX ADMIN — LEVETIRACETAM 250 MILLIGRAM(S): 250 TABLET, FILM COATED ORAL at 05:00

## 2019-12-29 RX ADMIN — Medication 1 MILLIGRAM(S): at 17:09

## 2019-12-29 NOTE — PROGRESS NOTE ADULT - ASSESSMENT
34 year old male reported history of seizure admitted for management break through seizure. EEG thus far does not show evidence of seizures. As he appears drowsy throughout the day I will reduce Depakote to 1000 mg BID. Labwork shows normal CMP and Valproic acid level was peak draw. Will continue VEEG to assess for seizure activity.

## 2019-12-29 NOTE — PROGRESS NOTE ADULT - SUBJECTIVE AND OBJECTIVE BOX
[id number]  VAN WOMACK  34y    Male    Allergies: No Known Allergies      Medications: cholecalciferol 1000 Unit(s) Oral daily  clonazePAM  Tablet 1 milliGRAM(s) Oral two times a day  diVALproex  milliGRAM(s) Oral two times a day  diVALproex DR 1000 milliGRAM(s) Oral two times a day  heparin  Injectable 5000 Unit(s) SubCutaneous two times a day  levETIRAcetam 1000 milliGRAM(s) Oral two times a day  levETIRAcetam 250 milliGRAM(s) Oral two times a day  LORazepam   Injectable 2 milliGRAM(s) IV Push three times a day PRN      T(C): 35.6 (12-29-19 @ 05:28), Max: 36.4 (12-28-19 @ 13:37)  HR: 54 (12-29-19 @ 05:28) (47 - 55)  BP: 120/70 (12-29-19 @ 05:28) (103/57 - 120/70)  RR: 16 (12-29-19 @ 05:28) (16 - 16)    No events overnight  Day #2 VEEG continues to show well organized background with mild generalized slowing. There are no epileptiform discharges or electrographic seizures    PHYSICAL EXAM:    Drowsy, verbal with slow ledy of speech. Follows directions well.    Neurological: CN II-XII in tact. No nystagmus. Motor grossly in tact

## 2019-12-29 NOTE — PROGRESS NOTE ADULT - SUBJECTIVE AND OBJECTIVE BOX
VAN WOMACK  34y  Male      Patient is a 34y old  Male who presents with a chief complaint of Seizure (27 Dec 2019 14:46)      INTERVAL HPI/OVERNIGHT EVENTS:  Patient seen and examined earlier this morning.  Lying comfortably in bed on VEEG.  No complaints.    Neurology wanted to continue VEEG and AED's at current doses yesterday  await for follow up today       REVIEW OF SYSTEMS:  CONSTITUTIONAL: No fever, weight loss, or fatigue  EYES: No eye pain, visual disturbances, or discharge  ENMT:  No difficulty hearing, tinnitus, vertigo; No sinus or throat pain  NECK: No pain or stiffness  RESPIRATORY: No cough, wheezing, chills or hemoptysis; No shortness of breath  CARDIOVASCULAR: No chest pain, palpitations, dizziness, or leg swelling  GASTROINTESTINAL: No abdominal or epigastric pain. No nausea, vomiting, or hematemesis; No diarrhea or constipation. No melena or hematochezia.  GENITOURINARY: No dysuria, frequency, hematuria, or incontinence  NEUROLOGICAL: No headaches, memory loss, loss of strength, numbness, or tremors  SKIN: No itching, burning, rashes, or lesions   LYMPH NODES: No enlarged glands  ENDOCRINE: No heat or cold intolerance; No hair loss  MUSCULOSKELETAL: No joint pain or swelling; No muscle, back, or extremity pain  PSYCHIATRIC: No depression, anxiety, mood swings, or difficulty sleeping  HEME/LYMPH: No easy bruising, or bleeding gums  ALLERY AND IMMUNOLOGIC: No hives or eczema    Vital Signs Last 24 Hrs  T(C): 35.6 (29 Dec 2019 05:28), Max: 36.4 (28 Dec 2019 13:37)  T(F): 96 (29 Dec 2019 05:28), Max: 97.5 (28 Dec 2019 13:37)  HR: 54 (29 Dec 2019 05:28) (47 - 55)  BP: 120/70 (29 Dec 2019 05:28) (103/57 - 120/70)  BP(mean): --  RR: 16 (29 Dec 2019 05:28) (16 - 16)  SpO2: --    PHYSICAL EXAM:  GENERAL: NAD, well-groomed, well-developed; slow speech  HEAD:  Atraumatic, Normocephalic  EYES: EOMI, PERRLA, conjunctiva and sclera clear  ENMT: No tonsillar erythema, exudates, or enlargement; Moist mucous membranes, Good dentition, No lesions  NECK: Supple, No JVD, Normal thyroid  NERVOUS SYSTEM:  Alert & Oriented X3, Good concentration; Motor Strength 5/5 B/L upper and lower extremities; DTRs 2+ intact and symmetric  CHEST/LUNG: Clear to percussion bilaterally; No rales, rhonchi, wheezing, or rubs  HEART: Regular rate and rhythm; No murmurs, rubs, or gallops  ABDOMEN: Soft, Nontender, Nondistended; Bowel sounds present  EXTREMITIES:  2+ Peripheral Pulses, No clubbing, cyanosis, or edema  LYMPH: No lymphadenopathy noted  SKIN: No rashes or lesions    Consultant(s) Notes Reviewed:  [x ] YES  [ ] NO  Care Discussed with Consultants/Other Providers [ x] YES  [ ] NO    LAB:       Mg     1.8     12-28                         14.5   6.68  )-----------( 128      ( 27 Dec 2019 08:57 )             42.2     12-27    139  |  104  |  13  ----------------------------<  127<H>  3.7   |  26  |  1.0    Ca    9.3      27 Dec 2019 08:57      TPro  6.7  /  Alb  3.9  /  TBili  0.4  /  DBili  x   /  AST  13  /  ALT  8   /  AlkPhos  22<L>  12-27    LIVER FUNCTIONS - ( 27 Dec 2019 08:57 )  Alb: 3.9 g/dL / Pro: 6.7 g/dL / ALK PHOS: 22 U/L / ALT: 8 U/L / AST: 13 U/L / GGT: x           CARDIAC MARKERS ( 26 Dec 2019 06:58 )  x     / x     / 252 U/L / x     / x            Drug Dosing Weight  Height (cm): 167.6 (26 Dec 2019 15:57)  Weight (kg): 73.1 (26 Dec 2019 15:57)  BMI (kg/m2): 26 (26 Dec 2019 15:57)  BSA (m2): 1.82 (26 Dec 2019 15:57)      RADIOLOGY & ADDITIONAL TESTS:  Imaging Personally Reviewed:  [x] YES  [ ] NO    HEALTH ISSUES - PROBLEM Dx:  Medication management: Medication management  Seizure: Seizure      MEDICATIONS  (STANDING):  cholecalciferol 1000 Unit(s) Oral daily  clonazePAM  Tablet 1 milliGRAM(s) Oral two times a day  diVALproex  milliGRAM(s) Oral two times a day  diVALproex DR 1000 milliGRAM(s) Oral two times a day  heparin  Injectable 5000 Unit(s) SubCutaneous two times a day  levETIRAcetam 1000 milliGRAM(s) Oral two times a day  levETIRAcetam 250 milliGRAM(s) Oral two times a day    MEDICATIONS  (PRN):  LORazepam   Injectable 2 milliGRAM(s) IV Push three times a day PRN generalized tonic-clonic seizure lasting longer than 2 minutes

## 2019-12-29 NOTE — PROGRESS NOTE ADULT - ASSESSMENT
1. Breakthrough seizure  -continue VEEG as per neuro  -seizure precautions  -AED's as per neurology  -ativan for seizure lasting over 2 min or two consecutive seizures  -valproic acid elevated yesterday but was drawn several hours after medication was administered  -today's Keppra level will also be inaccurate       2. Schizophrenia  -continue current tx  -return to [facility] upon d/c - will not accept pt over the weekend as per case management     Progress Note Handoff  Pending Consults: none  Pending Tests: veeg  Pending Results: veeg  Family Discussion: discussed veeg and d/c to [facility] when cleared by neuro - pt in agreement  Disposition: Home_____/SNF______/Other__[facility]___/Unknown at this time_____    Please call me with any questions at extension [phone]

## 2019-12-30 LAB
LEVETIRACETAM SERPL-MCNC: 37 MCG/ML — SIGNIFICANT CHANGE UP (ref 12–46)
MAGNESIUM SERPL-MCNC: 2.1 MG/DL — SIGNIFICANT CHANGE UP (ref 1.8–2.4)
VALPROATE SERPL-MCNC: 76 UG/ML — SIGNIFICANT CHANGE UP (ref 50–100)

## 2019-12-30 PROCEDURE — 99233 SBSQ HOSP IP/OBS HIGH 50: CPT

## 2019-12-30 PROCEDURE — 95951: CPT | Mod: 26

## 2019-12-30 RX ORDER — MAGNESIUM SULFATE 500 MG/ML
2 VIAL (ML) INJECTION ONCE
Refills: 0 | Status: COMPLETED | OUTPATIENT
Start: 2019-12-30 | End: 2019-12-30

## 2019-12-30 RX ORDER — CLONAZEPAM 1 MG
0.5 TABLET ORAL
Refills: 0 | Status: DISCONTINUED | OUTPATIENT
Start: 2019-12-30 | End: 2019-12-31

## 2019-12-30 RX ADMIN — DIVALPROEX SODIUM 1000 MILLIGRAM(S): 500 TABLET, DELAYED RELEASE ORAL at 18:57

## 2019-12-30 RX ADMIN — LEVETIRACETAM 1000 MILLIGRAM(S): 250 TABLET, FILM COATED ORAL at 18:56

## 2019-12-30 RX ADMIN — LEVETIRACETAM 250 MILLIGRAM(S): 250 TABLET, FILM COATED ORAL at 05:41

## 2019-12-30 RX ADMIN — Medication 1 MILLIGRAM(S): at 05:41

## 2019-12-30 RX ADMIN — HEPARIN SODIUM 5000 UNIT(S): 5000 INJECTION INTRAVENOUS; SUBCUTANEOUS at 18:56

## 2019-12-30 RX ADMIN — Medication 50 GRAM(S): at 10:31

## 2019-12-30 RX ADMIN — Medication 1000 UNIT(S): at 12:12

## 2019-12-30 RX ADMIN — DIVALPROEX SODIUM 1000 MILLIGRAM(S): 500 TABLET, DELAYED RELEASE ORAL at 05:42

## 2019-12-30 RX ADMIN — LEVETIRACETAM 1000 MILLIGRAM(S): 250 TABLET, FILM COATED ORAL at 05:41

## 2019-12-30 RX ADMIN — Medication 0.5 MILLIGRAM(S): at 18:56

## 2019-12-30 NOTE — PROGRESS NOTE ADULT - ASSESSMENT
1. Breakthrough seizure  -continue VEEG as per neuro  -seizure precautions  -AED's as per neurology - depakote lowered  -ativan for seizure lasting over 2 min or two consecutive seizures  -await neuro follow up      2. Schizophrenia  -continue current tx  -return to [facility] today? - did not accept pt over the weekend as per case management     Progress Note Handoff  Pending Consults: none  Pending Tests: veeg  Pending Results: veeg  Family Discussion: discussed veeg and d/c to [facility] when cleared by neuro - pt in agreement  Disposition: Home_____/SNF______/Other__[facility]___/Unknown at this time_____    Please call me with any questions at extension [phone]

## 2019-12-30 NOTE — PROGRESS NOTE ADULT - SUBJECTIVE AND OBJECTIVE BOX
Epilepsy Attending Note:     VAN WOMACK    34y Male  MRN [id number]    Vital Signs Last 24 Hrs  T(C): 35.6 (30 Dec 2019 06:09), Max: 36.6 (29 Dec 2019 14:43)  T(F): 96 (30 Dec 2019 06:09), Max: 97.8 (29 Dec 2019 14:43)  HR: 50 (30 Dec 2019 06:09) (50 - 54)  BP: 101/68 (30 Dec 2019 06:09) (101/68 - 104/58)  BP(mean): --  RR: 16 (30 Dec 2019 06:09) (16 - 16)  SpO2: --                MEDICATIONS  (STANDING):  cholecalciferol 1000 Unit(s) Oral daily  clonazePAM  Tablet 1 milliGRAM(s) Oral two times a day  diVALproex DR 1000 milliGRAM(s) Oral two times a day  heparin  Injectable 5000 Unit(s) SubCutaneous two times a day  levETIRAcetam 1000 milliGRAM(s) Oral two times a day  levETIRAcetam 250 milliGRAM(s) Oral two times a day    MEDICATIONS  (PRN):  LORazepam   Injectable 2 milliGRAM(s) IV Push three times a day PRN generalized tonic-clonic seizure lasting longer than 2 minutes      Valproic Acid Level, Serum: 114.0 ug/mL [50.0 - 100.0] (12-28-19 @ 07:39)        VEEG in the last 24 hours:    Background-----7-8 hz    Focal and generalized slowing----mild generalized slowing                                                   mild left hemispheric focal slowing       Interictal activity----------------small to moderate number of left hemispheric mainly FT  sharps and spikes                                              small number of diffusely expressed sharps  Events-------none    Seizures---none    Impression:---- abnormal as above    Plan -  He is quite sleepy . Considering  the levels that are high on his presumed maintenance dose , there is a possibility that he was not very compliant as an out patient            will do a trough level today            this is imprtant to have a long term plan           seizure precaution

## 2019-12-30 NOTE — PROGRESS NOTE ADULT - SUBJECTIVE AND OBJECTIVE BOX
VAN WOMACK  34y  Male      Patient is a 34y old  Male who presents with a chief complaint of Seizure (27 Dec 2019 14:46)      INTERVAL HPI/OVERNIGHT EVENTS:  Patient seen and examined earlier this morning.  Lying comfortably in bed on VEEG.  No complaints.    Neurology wanted to continue VEEG and AED's at lower dose of Depakote yesterday  await for follow up today       REVIEW OF SYSTEMS:  CONSTITUTIONAL: No fever, weight loss, or fatigue  EYES: No eye pain, visual disturbances, or discharge  ENMT:  No difficulty hearing, tinnitus, vertigo; No sinus or throat pain  NECK: No pain or stiffness  RESPIRATORY: No cough, wheezing, chills or hemoptysis; No shortness of breath  CARDIOVASCULAR: No chest pain, palpitations, dizziness, or leg swelling  GASTROINTESTINAL: No abdominal or epigastric pain. No nausea, vomiting, or hematemesis; No diarrhea or constipation. No melena or hematochezia.  GENITOURINARY: No dysuria, frequency, hematuria, or incontinence  NEUROLOGICAL: No headaches, memory loss, loss of strength, numbness, or tremors  SKIN: No itching, burning, rashes, or lesions   LYMPH NODES: No enlarged glands  ENDOCRINE: No heat or cold intolerance; No hair loss  MUSCULOSKELETAL: No joint pain or swelling; No muscle, back, or extremity pain  PSYCHIATRIC: No depression, anxiety, mood swings, or difficulty sleeping  HEME/LYMPH: No easy bruising, or bleeding gums  ALLERY AND IMMUNOLOGIC: No hives or eczema      Vital Signs Last 24 Hrs  T(C): 35.6 (30 Dec 2019 06:09), Max: 36.6 (29 Dec 2019 14:43)  T(F): 96 (30 Dec 2019 06:09), Max: 97.8 (29 Dec 2019 14:43)  HR: 50 (30 Dec 2019 06:09) (50 - 54)  BP: 101/68 (30 Dec 2019 06:09) (101/68 - 104/58)  BP(mean): --  RR: 16 (30 Dec 2019 06:09) (16 - 16)  SpO2: --      PHYSICAL EXAM:  GENERAL: NAD, well-groomed, well-developed; slow speech  HEAD:  Atraumatic, Normocephalic  EYES: EOMI, PERRLA, conjunctiva and sclera clear  ENMT: No tonsillar erythema, exudates, or enlargement; Moist mucous membranes, Good dentition, No lesions  NECK: Supple, No JVD, Normal thyroid  NERVOUS SYSTEM:  Alert & Oriented X3, Good concentration; Motor Strength 5/5 B/L upper and lower extremities; DTRs 2+ intact and symmetric  CHEST/LUNG: Clear to percussion bilaterally; No rales, rhonchi, wheezing, or rubs  HEART: Regular rate and rhythm; No murmurs, rubs, or gallops  ABDOMEN: Soft, Nontender, Nondistended; Bowel sounds present  EXTREMITIES:  2+ Peripheral Pulses, No clubbing, cyanosis, or edema  LYMPH: No lymphadenopathy noted  SKIN: No rashes or lesions    Consultant(s) Notes Reviewed:  [x ] YES  [ ] NO  Care Discussed with Consultants/Other Providers [ x] YES  [ ] NO    LAB:       Mg     1.8     12-28                         14.5   6.68  )-----------( 128      ( 27 Dec 2019 08:57 )             42.2     12-27    139  |  104  |  13  ----------------------------<  127<H>  3.7   |  26  |  1.0    Ca    9.3      27 Dec 2019 08:57      TPro  6.7  /  Alb  3.9  /  TBili  0.4  /  DBili  x   /  AST  13  /  ALT  8   /  AlkPhos  22<L>  12-27    LIVER FUNCTIONS - ( 27 Dec 2019 08:57 )  Alb: 3.9 g/dL / Pro: 6.7 g/dL / ALK PHOS: 22 U/L / ALT: 8 U/L / AST: 13 U/L / GGT: x           CARDIAC MARKERS ( 26 Dec 2019 06:58 )  x     / x     / 252 U/L / x     / x            Drug Dosing Weight  Height (cm): 167.6 (26 Dec 2019 15:57)  Weight (kg): 73.1 (26 Dec 2019 15:57)  BMI (kg/m2): 26 (26 Dec 2019 15:57)  BSA (m2): 1.82 (26 Dec 2019 15:57)      RADIOLOGY & ADDITIONAL TESTS:  Imaging Personally Reviewed:  [x] YES  [ ] NO    HEALTH ISSUES - PROBLEM Dx:  Medication management: Medication management  Seizure: Seizure      MEDICATIONS  (STANDING):  cholecalciferol 1000 Unit(s) Oral daily  clonazePAM  Tablet 1 milliGRAM(s) Oral two times a day  diVALproex DR 1000 milliGRAM(s) Oral two times a day  heparin  Injectable 5000 Unit(s) SubCutaneous two times a day  levETIRAcetam 1000 milliGRAM(s) Oral two times a day  levETIRAcetam 250 milliGRAM(s) Oral two times a day    MEDICATIONS  (PRN):  LORazepam   Injectable 2 milliGRAM(s) IV Push three times a day PRN generalized tonic-clonic seizure lasting longer than 2 minutes

## 2019-12-31 ENCOUNTER — TRANSCRIPTION ENCOUNTER (OUTPATIENT)
Age: 34
End: 2019-12-31

## 2019-12-31 VITALS
TEMPERATURE: 98 F | RESPIRATION RATE: 16 BRPM | SYSTOLIC BLOOD PRESSURE: 103 MMHG | DIASTOLIC BLOOD PRESSURE: 56 MMHG | HEART RATE: 67 BPM

## 2019-12-31 LAB
ALBUMIN SERPL ELPH-MCNC: 4.1 G/DL — SIGNIFICANT CHANGE UP (ref 3.5–5.2)
ALP SERPL-CCNC: 23 U/L — LOW (ref 30–115)
ALT FLD-CCNC: 11 U/L — SIGNIFICANT CHANGE UP (ref 0–41)
ANION GAP SERPL CALC-SCNC: 12 MMOL/L — SIGNIFICANT CHANGE UP (ref 7–14)
AST SERPL-CCNC: 14 U/L — SIGNIFICANT CHANGE UP (ref 0–41)
BILIRUB SERPL-MCNC: 0.4 MG/DL — SIGNIFICANT CHANGE UP (ref 0.2–1.2)
BUN SERPL-MCNC: 17 MG/DL — SIGNIFICANT CHANGE UP (ref 10–20)
CALCIUM SERPL-MCNC: 9.9 MG/DL — SIGNIFICANT CHANGE UP (ref 8.5–10.1)
CHLORIDE SERPL-SCNC: 103 MMOL/L — SIGNIFICANT CHANGE UP (ref 98–110)
CO2 SERPL-SCNC: 26 MMOL/L — SIGNIFICANT CHANGE UP (ref 17–32)
CREAT SERPL-MCNC: 1.1 MG/DL — SIGNIFICANT CHANGE UP (ref 0.7–1.5)
GLUCOSE SERPL-MCNC: 119 MG/DL — HIGH (ref 70–99)
HCT VFR BLD CALC: 47.5 % — SIGNIFICANT CHANGE UP (ref 42–52)
HGB BLD-MCNC: 16.4 G/DL — SIGNIFICANT CHANGE UP (ref 14–18)
MAGNESIUM SERPL-MCNC: 1.8 MG/DL — SIGNIFICANT CHANGE UP (ref 1.8–2.4)
MCHC RBC-ENTMCNC: 31.5 PG — HIGH (ref 27–31)
MCHC RBC-ENTMCNC: 34.5 G/DL — SIGNIFICANT CHANGE UP (ref 32–37)
MCV RBC AUTO: 91.3 FL — SIGNIFICANT CHANGE UP (ref 80–94)
NRBC # BLD: 0 /100 WBCS — SIGNIFICANT CHANGE UP (ref 0–0)
PLATELET # BLD AUTO: 145 K/UL — SIGNIFICANT CHANGE UP (ref 130–400)
POTASSIUM SERPL-MCNC: 4.4 MMOL/L — SIGNIFICANT CHANGE UP (ref 3.5–5)
POTASSIUM SERPL-SCNC: 4.4 MMOL/L — SIGNIFICANT CHANGE UP (ref 3.5–5)
PROT SERPL-MCNC: 7.4 G/DL — SIGNIFICANT CHANGE UP (ref 6–8)
RBC # BLD: 5.2 M/UL — SIGNIFICANT CHANGE UP (ref 4.7–6.1)
RBC # FLD: 11.8 % — SIGNIFICANT CHANGE UP (ref 11.5–14.5)
SODIUM SERPL-SCNC: 141 MMOL/L — SIGNIFICANT CHANGE UP (ref 135–146)
WBC # BLD: 7.57 K/UL — SIGNIFICANT CHANGE UP (ref 4.8–10.8)
WBC # FLD AUTO: 7.57 K/UL — SIGNIFICANT CHANGE UP (ref 4.8–10.8)

## 2019-12-31 PROCEDURE — 99239 HOSP IP/OBS DSCHRG MGMT >30: CPT

## 2019-12-31 PROCEDURE — 99231 SBSQ HOSP IP/OBS SF/LOW 25: CPT

## 2019-12-31 PROCEDURE — 95951: CPT | Mod: 26

## 2019-12-31 RX ORDER — DIVALPROEX SODIUM 500 MG/1
2 TABLET, DELAYED RELEASE ORAL
Qty: 0 | Refills: 0 | DISCHARGE
Start: 2019-12-31

## 2019-12-31 RX ORDER — CLONAZEPAM 1 MG
1 TABLET ORAL
Qty: 60 | Refills: 0
Start: 2019-12-31 | End: 2020-01-29

## 2019-12-31 RX ORDER — LEVETIRACETAM 250 MG/1
1 TABLET, FILM COATED ORAL
Qty: 0 | Refills: 0 | DISCHARGE
Start: 2019-12-31

## 2019-12-31 RX ADMIN — Medication 0.5 MILLIGRAM(S): at 05:07

## 2019-12-31 RX ADMIN — Medication 1000 UNIT(S): at 11:23

## 2019-12-31 RX ADMIN — LEVETIRACETAM 1000 MILLIGRAM(S): 250 TABLET, FILM COATED ORAL at 05:07

## 2019-12-31 RX ADMIN — DIVALPROEX SODIUM 1000 MILLIGRAM(S): 500 TABLET, DELAYED RELEASE ORAL at 05:07

## 2019-12-31 NOTE — PROGRESS NOTE ADULT - ASSESSMENT
This is a 34 year old right handed male with PMH of schizophrenia, epilepsy, Hep C and adjustment disorder presented to the ER for breakthrough seizure.        Assessment & Plan:    1. Breakthrough seizure  Completed VEEG as per neuro and medications adjusted.  Discharge on Depakote 1000 mg po q12hrs and Keppra 1000 mg po q 12hrs.  Continue Klonopin at 0.5 mg q12hr.  Follow up in the Neuro clinic on January 14th and [facility] is aware.   Drug levels to be done prior to his follow up.       2. Schizophrenia:  Continued home medications.          Prophylaxis: Heparin  Code status: Full code    Progress Note Handoff:  Pending consults: None  Pending Tests: None  Family/Patient discussion: Plan of care discussed with Patient and Staff at [facility]. Discharge Papers faxed.  Disposition: [facility]      Attending: Dr. Joanne Bowers. [facility] [phone].

## 2019-12-31 NOTE — PROGRESS NOTE ADULT - SUBJECTIVE AND OBJECTIVE BOX
VAN WOMACK  34y  Male    Patient is a 34y old  Male who presents with a chief complaint of Seizure (29 Dec 2019 09:20)      INTERVAL HPI/OVERNIGHT EVENTS:  Patient seen and examined earlier this morning.    Lying comfortably in bed on VEEG and eating breakfast.  No complaints.   Patient not discharged yesterday due to lethargy and sedation.  Medications Adjusted by Neurology.      REVIEW OF SYSTEMS:  At least 10 systems were reviewed in ROS.   All systems reviewed are within normal limits.  Patient at baseline with slow speech.      VITALS:  T(F): 97.6 (12-31-19 @ 05:00), Max: 97.7 (12-30-19 @ 22:23)  HR: 67 (12-31-19 @ 05:00) (54 - 67)  BP: 103/56 (12-31-19 @ 05:00) (98/55 - 115/69)  RR: 16 (12-31-19 @ 05:00) (16 - 16)  SpO2: --      PHYSICAL EXAM:  GENERAL: NAD, well-developed  HEAD:  Atraumatic, Normocephalic  EYES: conjunctiva and sclera clear  ENMT: Moist mucous membranes  NECK: Supple, Normal thyroid  NERVOUS SYSTEM:  Alert & Oriented X3, slow speech, Motor Strength 5/5 B/L upper and lower extremities.  CHEST/LUNG: Clear to auscultation bilaterally; No rales, rhonchi, wheezing, or rubs  HEART: Regular rate and rhythm; No murmurs, rubs, or gallops  ABDOMEN: Soft, Nontender, Nondistended; Bowel sounds present  EXTREMITIES:  2+ Peripheral Pulses, No clubbing, cyanosis, or edema  LYMPH: No lymphadenopathy noted  SKIN: No rashes or lesions    Consultant(s) Notes Reviewed:  [x ] YES  [ ] NO  Care Discussed with Consultants/Other Providers [ x] YES  [ ] NO    LABS:                        16.4   7.57  )-----------( 145      ( 31 Dec 2019 08:43 )             47.5     12-31    141  |  103  |  17  ----------------------------<  119<H>  4.4   |  26  |  1.1    Ca    9.9      31 Dec 2019 08:43  Mg     1.8     12-31    TPro  7.4  /  Alb  4.1  /  TBili  0.4  /  DBili  x   /  AST  14  /  ALT  11  /  AlkPhos  23<L>  12-31        MICROBIOLOGY: None      RADIOLOGY & ADDITIONAL TESTS:  CT Head No Cont (12.26.19 @ 07:46)   No evidence of acute intracranial pathology. Stable exam since 6/30/2019.      VEEG in the last 24 hours:    Background-------------8-9 superimposed by lower range of theta     Focal and generalized slowing----left hemispheric focal slowing                                                  mild generalized slowing    Interictal activity------------------left FT sharps /spikes     Events---------------------------none    Seizures  none    Impression:  abnormal as above       Imaging Personally Reviewed:  [x] YES  [ ] NO    MEDICATIONS  (STANDING):  cholecalciferol 1000 Unit(s) Oral daily  clonazePAM  Tablet 0.5 milliGRAM(s) Oral two times a day  diVALproex DR 1000 milliGRAM(s) Oral two times a day  heparin  Injectable 5000 Unit(s) SubCutaneous two times a day  levETIRAcetam 1000 milliGRAM(s) Oral two times a day      MEDICATIONS  (PRN):  LORazepam   Injectable 2 milliGRAM(s) IV Push three times a day PRN generalized tonic-clonic seizure lasting longer than 2 minutes      Home Medications:  clonazePAM 1 mg oral tablet: 1 tab(s) orally 2 times a day (27 Dec 2019 15:20)  divalproex sodium 500 mg oral delayed release tablet: 2 tab(s) orally 2 times a day (31 Dec 2019 10:30)  levETIRAcetam 1000 mg oral tablet: 1 tab(s) orally 2 times a day (31 Dec 2019 10:30)  RisperDAL Consta 37.5 mg/2 weeks intramuscular injection, extended release:  (27 Dec 2019 15:13)  Vitamin D3 1000 intl units (25 mcg) oral tablet: 1 tab(s) orally once a day (27 Dec 2019 15:13)        HEALTH ISSUES - PROBLEM Dx:  Seizure  Hep C w/o coma, chronic  Schizophrenia

## 2019-12-31 NOTE — PROGRESS NOTE ADULT - SUBJECTIVE AND OBJECTIVE BOX
Pt doing much better this morning. More awake and is able to keep a conversation.   Called [facility] and spoke with pts nurse Cynthia . She mentioned that there is a possibility that pt missed doses of his AEDs due to the fact that sometimes he is so sedated and difficult to arouse.  On admission pts Depakote level was 56 on a dose of 1375 mg q 12hrs after dose decrease in the hospitals the pt Depakote level is 76. Will discharge on Depakote 1000 mg po q12hrs and Keppra 1000 mg po q 12hrs which is also a decrease from his previous dose of 1250 mg po q 12hrs.    Pt has a follow up in the Neuro clinic on January 14th and [facility] is aware. Rx given for drug levels prior to his follow up.     In the future consider changing his AEDs to XR as an outpt for better compliance.

## 2019-12-31 NOTE — DISCHARGE NOTE NURSING/CASE MANAGEMENT/SOCIAL WORK - PATIENT PORTAL LINK FT
You can access the FollowMyHealth Patient Portal offered by [facility] by registering at the following website: [email or web address]. By joining [facility]’s FollowMyHealth portal, you will also be able to view your health information using other applications (apps) compatible with our system.

## 2019-12-31 NOTE — PROGRESS NOTE ADULT - SUBJECTIVE AND OBJECTIVE BOX
Epilepsy Attending Note:     VAN WOMACK    34y Male  MRN [id number]    Vital Signs Last 24 Hrs  T(C): 36.4 (31 Dec 2019 05:00), Max: 36.5 (30 Dec 2019 22:23)  T(F): 97.6 (31 Dec 2019 05:00), Max: 97.7 (30 Dec 2019 22:23)  HR: 67 (31 Dec 2019 05:00) (54 - 67)  BP: 103/56 (31 Dec 2019 05:00) (98/55 - 115/69)  BP(mean): --  RR: 16 (31 Dec 2019 05:00) (16 - 16)  SpO2: --                          16.4   7.57  )-----------( 145      ( 31 Dec 2019 08:43 )             47.5       12-31    141  |  103  |  17  ----------------------------<  119<H>  4.4   |  26  |  1.1    Ca    9.9      31 Dec 2019 08:43  Mg     1.8     12-31    TPro  7.4  /  Alb  4.1  /  TBili  0.4  /  DBili  x   /  AST  14  /  ALT  11  /  AlkPhos  23<L>  12-31      MEDICATIONS  (STANDING):  cholecalciferol 1000 Unit(s) Oral daily  clonazePAM  Tablet 0.5 milliGRAM(s) Oral two times a day  diVALproex DR 1000 milliGRAM(s) Oral two times a day  heparin  Injectable 5000 Unit(s) SubCutaneous two times a day  levETIRAcetam 1000 milliGRAM(s) Oral two times a day    MEDICATIONS  (PRN):  LORazepam   Injectable 2 milliGRAM(s) IV Push three times a day PRN generalized tonic-clonic seizure lasting longer than 2 minutes      Valproic Acid Level, Serum: 76.0 ug/mL [50.0 - 100.0] (12-30-19 @ 15:18)        VEEG in the last 24 hours:    Background-------------8-9 superimposed by lower range of theta     Focal and generalized slowing----left hemispheric focal slowing                                                  mild generalized slowing    Interictal activity------------------left FT sharps /spikes     Events---------------------------none    Seizures  none    Impression:  abnormal as above     Plan - will DC the recording          please see the final adjustments in the NP note at the time of discharge

## 2020-01-01 LAB — LEVETIRACETAM SERPL-MCNC: 22.6 MCG/ML — SIGNIFICANT CHANGE UP (ref 12–46)

## 2020-01-04 ENCOUNTER — INPATIENT (INPATIENT)
Facility: HOSPITAL | Age: 35
LOS: 1 days | Discharge: HOME | End: 2020-01-06
Attending: INTERNAL MEDICINE | Admitting: INTERNAL MEDICINE
Payer: MEDICAID

## 2020-01-04 VITALS
HEART RATE: 95 BPM | TEMPERATURE: 98 F | SYSTOLIC BLOOD PRESSURE: 127 MMHG | OXYGEN SATURATION: 100 % | DIASTOLIC BLOOD PRESSURE: 79 MMHG | RESPIRATION RATE: 18 BRPM

## 2020-01-04 LAB
ALBUMIN SERPL ELPH-MCNC: 4 G/DL — SIGNIFICANT CHANGE UP (ref 3.5–5.2)
ALP SERPL-CCNC: 23 U/L — LOW (ref 30–115)
ALT FLD-CCNC: 9 U/L — SIGNIFICANT CHANGE UP (ref 0–41)
ANION GAP SERPL CALC-SCNC: 17 MMOL/L — HIGH (ref 7–14)
APPEARANCE UR: CLEAR — SIGNIFICANT CHANGE UP
AST SERPL-CCNC: 13 U/L — SIGNIFICANT CHANGE UP (ref 0–41)
BACTERIA # UR AUTO: NEGATIVE — SIGNIFICANT CHANGE UP
BASOPHILS # BLD AUTO: 0.01 K/UL — SIGNIFICANT CHANGE UP (ref 0–0.2)
BASOPHILS NFR BLD AUTO: 0.1 % — SIGNIFICANT CHANGE UP (ref 0–1)
BILIRUB SERPL-MCNC: 0.4 MG/DL — SIGNIFICANT CHANGE UP (ref 0.2–1.2)
BILIRUB UR-MCNC: NEGATIVE — SIGNIFICANT CHANGE UP
BUN SERPL-MCNC: 12 MG/DL — SIGNIFICANT CHANGE UP (ref 10–20)
CALCIUM SERPL-MCNC: 9.6 MG/DL — SIGNIFICANT CHANGE UP (ref 8.5–10.1)
CHLORIDE SERPL-SCNC: 102 MMOL/L — SIGNIFICANT CHANGE UP (ref 98–110)
CO2 SERPL-SCNC: 19 MMOL/L — SIGNIFICANT CHANGE UP (ref 17–32)
COLOR SPEC: YELLOW — SIGNIFICANT CHANGE UP
CREAT SERPL-MCNC: 1 MG/DL — SIGNIFICANT CHANGE UP (ref 0.7–1.5)
DIFF PNL FLD: NEGATIVE — SIGNIFICANT CHANGE UP
EOSINOPHIL # BLD AUTO: 0.02 K/UL — SIGNIFICANT CHANGE UP (ref 0–0.7)
EOSINOPHIL NFR BLD AUTO: 0.2 % — SIGNIFICANT CHANGE UP (ref 0–8)
EPI CELLS # UR: 0 /HPF — SIGNIFICANT CHANGE UP (ref 0–5)
GLUCOSE SERPL-MCNC: 81 MG/DL — SIGNIFICANT CHANGE UP (ref 70–99)
GLUCOSE UR QL: NEGATIVE — SIGNIFICANT CHANGE UP
HCT VFR BLD CALC: 46.5 % — SIGNIFICANT CHANGE UP (ref 42–52)
HGB BLD-MCNC: 16 G/DL — SIGNIFICANT CHANGE UP (ref 14–18)
HYALINE CASTS # UR AUTO: 1 /LPF — SIGNIFICANT CHANGE UP (ref 0–7)
IMM GRANULOCYTES NFR BLD AUTO: 0.3 % — SIGNIFICANT CHANGE UP (ref 0.1–0.3)
KETONES UR-MCNC: ABNORMAL
LEUKOCYTE ESTERASE UR-ACNC: NEGATIVE — SIGNIFICANT CHANGE UP
LYMPHOCYTES # BLD AUTO: 2.06 K/UL — SIGNIFICANT CHANGE UP (ref 1.2–3.4)
LYMPHOCYTES # BLD AUTO: 22.6 % — SIGNIFICANT CHANGE UP (ref 20.5–51.1)
MAGNESIUM SERPL-MCNC: 1.8 MG/DL — SIGNIFICANT CHANGE UP (ref 1.8–2.4)
MCHC RBC-ENTMCNC: 31.6 PG — HIGH (ref 27–31)
MCHC RBC-ENTMCNC: 34.4 G/DL — SIGNIFICANT CHANGE UP (ref 32–37)
MCV RBC AUTO: 91.7 FL — SIGNIFICANT CHANGE UP (ref 80–94)
MONOCYTES # BLD AUTO: 1 K/UL — HIGH (ref 0.1–0.6)
MONOCYTES NFR BLD AUTO: 11 % — HIGH (ref 1.7–9.3)
NEUTROPHILS # BLD AUTO: 6 K/UL — SIGNIFICANT CHANGE UP (ref 1.4–6.5)
NEUTROPHILS NFR BLD AUTO: 65.8 % — SIGNIFICANT CHANGE UP (ref 42.2–75.2)
NITRITE UR-MCNC: NEGATIVE — SIGNIFICANT CHANGE UP
NRBC # BLD: 0 /100 WBCS — SIGNIFICANT CHANGE UP (ref 0–0)
PH UR: 6.5 — SIGNIFICANT CHANGE UP (ref 5–8)
PLATELET # BLD AUTO: 158 K/UL — SIGNIFICANT CHANGE UP (ref 130–400)
POTASSIUM SERPL-MCNC: 4.2 MMOL/L — SIGNIFICANT CHANGE UP (ref 3.5–5)
POTASSIUM SERPL-SCNC: 4.2 MMOL/L — SIGNIFICANT CHANGE UP (ref 3.5–5)
PROT SERPL-MCNC: 7 G/DL — SIGNIFICANT CHANGE UP (ref 6–8)
PROT UR-MCNC: ABNORMAL
RBC # BLD: 5.07 M/UL — SIGNIFICANT CHANGE UP (ref 4.7–6.1)
RBC # FLD: 12 % — SIGNIFICANT CHANGE UP (ref 11.5–14.5)
RBC CASTS # UR COMP ASSIST: 1 /HPF — SIGNIFICANT CHANGE UP (ref 0–4)
SODIUM SERPL-SCNC: 138 MMOL/L — SIGNIFICANT CHANGE UP (ref 135–146)
SP GR SPEC: 1.03 — HIGH (ref 1.01–1.02)
UROBILINOGEN FLD QL: SIGNIFICANT CHANGE UP
VALPROATE SERPL-MCNC: 61 UG/ML — SIGNIFICANT CHANGE UP (ref 50–100)
WBC # BLD: 9.12 K/UL — SIGNIFICANT CHANGE UP (ref 4.8–10.8)
WBC # FLD AUTO: 9.12 K/UL — SIGNIFICANT CHANGE UP (ref 4.8–10.8)
WBC UR QL: 1 /HPF — SIGNIFICANT CHANGE UP (ref 0–5)

## 2020-01-04 PROCEDURE — 71045 X-RAY EXAM CHEST 1 VIEW: CPT | Mod: 26

## 2020-01-04 PROCEDURE — 99285 EMERGENCY DEPT VISIT HI MDM: CPT

## 2020-01-04 PROCEDURE — 70450 CT HEAD/BRAIN W/O DYE: CPT | Mod: 26

## 2020-01-04 PROCEDURE — 93010 ELECTROCARDIOGRAM REPORT: CPT | Mod: 76

## 2020-01-04 RX ORDER — SODIUM CHLORIDE 9 MG/ML
1000 INJECTION, SOLUTION INTRAVENOUS ONCE
Refills: 0 | Status: COMPLETED | OUTPATIENT
Start: 2020-01-04 | End: 2020-01-04

## 2020-01-04 RX ORDER — HEPARIN SODIUM 5000 [USP'U]/ML
5000 INJECTION INTRAVENOUS; SUBCUTANEOUS EVERY 8 HOURS
Refills: 0 | Status: DISCONTINUED | OUTPATIENT
Start: 2020-01-04 | End: 2020-01-06

## 2020-01-04 RX ORDER — CHOLECALCIFEROL (VITAMIN D3) 125 MCG
1000 CAPSULE ORAL DAILY
Refills: 0 | Status: DISCONTINUED | OUTPATIENT
Start: 2020-01-04 | End: 2020-01-06

## 2020-01-04 RX ORDER — DIVALPROEX SODIUM 500 MG/1
1000 TABLET, DELAYED RELEASE ORAL
Refills: 0 | Status: DISCONTINUED | OUTPATIENT
Start: 2020-01-04 | End: 2020-01-06

## 2020-01-04 RX ORDER — CHLORHEXIDINE GLUCONATE 213 G/1000ML
1 SOLUTION TOPICAL
Refills: 0 | Status: DISCONTINUED | OUTPATIENT
Start: 2020-01-04 | End: 2020-01-06

## 2020-01-04 RX ORDER — DIVALPROEX SODIUM 500 MG/1
1000 TABLET, DELAYED RELEASE ORAL ONCE
Refills: 0 | Status: COMPLETED | OUTPATIENT
Start: 2020-01-04 | End: 2020-01-04

## 2020-01-04 RX ORDER — CLONAZEPAM 1 MG
0.5 TABLET ORAL
Refills: 0 | Status: DISCONTINUED | OUTPATIENT
Start: 2020-01-04 | End: 2020-01-06

## 2020-01-04 RX ORDER — LEVETIRACETAM 250 MG/1
1000 TABLET, FILM COATED ORAL ONCE
Refills: 0 | Status: COMPLETED | OUTPATIENT
Start: 2020-01-04 | End: 2020-01-04

## 2020-01-04 RX ORDER — LEVETIRACETAM 250 MG/1
1500 TABLET, FILM COATED ORAL
Refills: 0 | Status: DISCONTINUED | OUTPATIENT
Start: 2020-01-04 | End: 2020-01-06

## 2020-01-04 RX ADMIN — DIVALPROEX SODIUM 1000 MILLIGRAM(S): 500 TABLET, DELAYED RELEASE ORAL at 14:59

## 2020-01-04 RX ADMIN — LEVETIRACETAM 1000 MILLIGRAM(S): 250 TABLET, FILM COATED ORAL at 12:45

## 2020-01-04 RX ADMIN — SODIUM CHLORIDE 2000 MILLILITER(S): 9 INJECTION, SOLUTION INTRAVENOUS at 11:55

## 2020-01-04 RX ADMIN — SODIUM CHLORIDE 1000 MILLILITER(S): 9 INJECTION, SOLUTION INTRAVENOUS at 13:00

## 2020-01-04 RX ADMIN — Medication 0.5 MILLIGRAM(S): at 19:03

## 2020-01-04 RX ADMIN — LEVETIRACETAM 400 MILLIGRAM(S): 250 TABLET, FILM COATED ORAL at 12:18

## 2020-01-04 NOTE — ED ADULT NURSE NOTE - OBJECTIVE STATEMENT
pt biba from [address] [place] [address] after witnessed seizure. pt recently dc'd from [facility] after having increase in medication.

## 2020-01-04 NOTE — ED ADULT NURSE NOTE - NSIMPLEMENTINTERV_GEN_ALL_ED
Implemented All Fall with Harm Risk Interventions:  [place] to call system. Call bell, personal items and telephone within reach. Instruct patient to call for assistance. Room bathroom lighting operational. Non-slip footwear when patient is off stretcher. Physically safe environment: no spills, clutter or unnecessary equipment. Stretcher in lowest position, wheels locked, appropriate side rails in place. Provide visual cue, wrist band, yellow gown, etc. Monitor gait and stability. Monitor for mental status changes and reorient to person, place, and time. Review medications for side effects contributing to fall risk. Reinforce activity limits and safety measures with patient and family. Provide visual clues: red socks.

## 2020-01-04 NOTE — ED PROVIDER NOTE - PHYSICAL EXAMINATION
CONSTITUTIONAL: Well-developed; well-nourished;  SKIN: warm, dry  HEAD: Normocephalic; atraumatic.  EYES: no conjunctival injection. PERRL.   ENT: No nasal discharge; airway clear.  NECK: Supple; non tender.  CARD: S1, S2 normal; no murmurs, gallops, or rubs. Regular rate and rhythm.   RESP: No wheezes, rales or rhonchi.  ABD: soft ntnd  EXT: Normal ROM.  No clubbing, cyanosis or edema.   LYMPH: No acute cervical adenopathy.  NEURO: Alert, oriented, grossly unremarkable, slow to respond, AOx4, CN 2-12 grossly intact. +5/5 strength and sensation wnl in all extremities. No pronator drift, no dysarthria, no ataxia, normal gait, no DM/DDK, negative romberg.  PSYCH: Intermittently agitated.

## 2020-01-04 NOTE — ED PROVIDER NOTE - OBJECTIVE STATEMENT
33 y/o male with PMH of schizophrenia, seizure disorder on Keppra 100 mg BID and Depakote 1000 mg BID who presents to ED for seizure. Pt states he has been taking his medications as prescribed. Pt is not sure when last siezure was. No fever, chills. No n/v, diarrhea. Pt denies head injury. 33 y/o male with PMH of schizophrenia, seizure disorder on Keppra 100 mg BID and Depakote 1000 mg BID who presents to ED for seizure. Pt states he has been taking his medications as prescribed. Pt is not sure when last seizure was. No fever, chills. No n/v, diarrhea. Pt denies head injury.

## 2020-01-04 NOTE — ED PROVIDER NOTE - CLINICAL SUMMARY MEDICAL DECISION MAKING FREE TEXT BOX
35 y/o M with PMH of seizures, had a recently change is medication dosages for Depakote and Kepra presents to ED s/p seizure, unwitnessed. On my evaluation, pt noted ot be post-ictal, confused, trying to get out of bed, stating he is hungry, uncooperative with answering other questions. Pt is drowsy appearing with no other focal deficits, placed on 1 on 1, IV Kepra and Depakote given. CT head normal. Admitted to medicine for persistent altered mentation and possible post-ictal state. 33 y/o M with PMH of seizures, had a recently change is medication dosages for Depakote and Keppra presents to ED s/p seizure, unwitnessed. On my evaluation, pt noted ot be post-ictal, confused, trying to get out of bed, stating he is hungry, uncooperative with answering other questions. Pt is drowsy appearing with no other focal deficits, placed on 1 on 1, IV Keppra and Depakote given. CT head normal. Admitted to medicine for persistent altered mentation and possible post-ictal state.

## 2020-01-04 NOTE — H&P ADULT - NSHPSOCIALHISTORY_GEN_ALL_CORE
Marital Status:  (   )    ( x  ) Single    (   )    (  )   Lives with: ( x ) alone  (  ) children   (  ) spouse   (  ) parents  (  ) other  Recent Travel: No recent travel    Substance Use (street drugs): ( x ) never used  (  ) other:  Tobacco Usage:  ( x  ) never smoked   (   ) former smoker   (   ) current smoker  (     ) pack year  Alcohol Usage: None

## 2020-01-04 NOTE — ED ADULT NURSE REASSESSMENT NOTE - NS ED NURSE REASSESS COMMENT FT1
pt resting comfortably in bed. pt appears lethargic. pt responded to verbal stimuli but only answered questions in one word answers. pt refused his 10pm medications immediately closing his eyes and laying down after the conversation. MD called and notified, stating that he would come down to check on patient.

## 2020-01-04 NOTE — ED PROVIDER NOTE - PROGRESS NOTE DETAILS
Discussed with Dr. Jamison neurology recommends increased Keppra to 1500 mg BID, pt has questionable history of compliance. Pt not at baseline mental status. Patient to be admitted to an inpatient floor. Case discussed with and care endorsed to medical admitting resident. Admitting physician notified.

## 2020-01-04 NOTE — H&P ADULT - NSHPPHYSICALEXAM_GEN_ALL_CORE
GENERAL: NAD, well-developed  PSYCH: AAOx3  HEENT:  Atraumatic, Normocephalic. EOMI, PERRLA, conjunctiva clear, sclera white, No JVD  PULMONARY: Clear to auscultation bilaterally; No wheeze  CARDIOVASCULAR: Regular rate and rhythm; No murmurs, rubs, or gallops  GASTROINTESTINAL: Soft, Nontender, Nondistended; Bowel sounds present  MUSCULOSKELETAL:  2+ Peripheral Pulses, No clubbing, cyanosis, or edema  NEUROLOGY: non-focal  SKIN: No rashes or lesions GENERAL: NAD, well-developed  PSYCH: AAOx3, Agitated   HEENT:  Atraumatic, Normocephalic. EOMI, PERRLA, conjunctiva clear, sclera white, No JVD  PULMONARY: Clear to auscultation bilaterally; No wheeze  CARDIOVASCULAR: Regular rate and rhythm; No murmurs, rubs, or gallops  GASTROINTESTINAL: Soft, Nontender, Nondistended; Bowel sounds present  MUSCULOSKELETAL:  2+ Peripheral Pulses, No clubbing, cyanosis, or edema  NEUROLOGY: non-focal  SKIN: No rashes or lesions

## 2020-01-04 NOTE — H&P ADULT - NSHPLABSRESULTS_GEN_ALL_CORE
Labs:      138  |  102  |  12  ----------------------------<  81  4.2   |  19  |  1.0    Ca    9.6      2020 12:00  Mg     1.8         TPro  7.0  /  Alb  4.0  /  TBili  0.4  /  DBili  x   /  AST  13  /  ALT  9   /  AlkPhos  23<L>                            16.0   9.12  )-----------( 158      ( 2020 12:00 )             46.5     Urinalysis Basic - ( 2020 14:30 )    Color: Yellow / Appearance: Clear / S.030 / pH: x  Gluc: x / Ketone: Small  / Bili: Negative / Urobili: <2 mg/dL   Blood: x / Protein: 30 mg/dL / Nitrite: Negative   Leuk Esterase: Negative / RBC: 1 /HPF / WBC 1 /HPF   Sq Epi: x / Non Sq Epi: 0 /HPF / Bacteria: Negative    LIVER FUNCTIONS - ( 2020 12:00 )  Alb: 4.0 g/dL / Pro: 7.0 g/dL / ALK PHOS: 23 U/L / ALT: 9 U/L / AST: 13 U/L / GGT: x           CAPILLARY BLOOD GLUCOSE      POCT Blood Glucose.: 83 mg/dL (2020 09:29)      CT BRAIN            PROCEDURE DATE:  2020       INTERPRETATION:  Clinical History / Reason for exam: Clinical indication: Seizures.    Multiple axial sections were performed base of skull to vertex contrast enhancement. Coronal and sagittal reconstructions were performed as well    The lateral ventricles have a normal configuration.    There is no evidence of acute hemorrhage mass or mass effect in the posterior fossa or supratentorial region.    Evaluation of the osseous structures with the appropriate window demonstrates irregularity seen involving the lateral wall of the left maxillary sinus, this is likely the result of of old trauma.    The visualized paranasal sinuses mastoid and middle ear regions appear clear.    Impression: No evidence of acute hemorrhage, mass or mass effect.

## 2020-01-04 NOTE — H&P ADULT - HISTORY OF PRESENT ILLNESS
34 year old with schizophrenia presenting with unwitnessed seizure from [facility] [place].  He was recently discharged from [facility] for breakthrough seizure. He was placed on veeg for several days and aed's were adjusted as per neurology due to lethargy. Patient had a recently change in medication dosages for Depakote and Keppra presents to ED s/p seizure, unwitnessed.  Patent was noted ot be post-ictal, confused, trying to get out of bed, stating he is hungry, uncooperative with answering other questions upon exam in the ED. Pt is drowsy appearing with no other focal deficits, placed on 1 on 1, IV Keppra and Depakote were given. CT head was normal. He was admitted to medicine for persistent altered mentation and possible post-ictal state.  ED discussed case with Dr. Jamison neurology,  recommended increased Keppra to 1500 mg BID. 34 year old with schizophrenia presenting with unwitnessed seizure from [facility].  He was recently discharged from [facility] for breakthrough seizure. He was placed on veeg for several days and aed's were adjusted as per neurology due to lethargy. Patient had a recently change in medication dosages for Depakote and Keppra presents to ED s/p seizure, unwitnessed.  Patent was noted ot be post-ictal, confused, trying to get out of bed, stating he is hungry, uncooperative with answering other questions upon exam in the ED. Pt is drowsy appearing with no other focal deficits, placed on 1 on 1, IV Keppra and Depakote were given. CT head was normal. He was admitted to medicine for persistent altered mentation and possible post-ictal state.  ED discussed case with Dr. Jamison neurology,  recommended increased Keppra to 1500 mg BID.     [facility], [address] [place] [address] 34 year old with schizophrenia presenting following witnessed seizure from [address] ([facility]).  He was discharged from [facility] on 12/27/19 for breakthrough seizure. During that time He was placed on veeg for several days and aed's were adjusted (Depakote and Keppra )  as per neurology due to lethargy.  Patient is a very poor historian, often shouting one word answers. Per notes when the patient presented to the ED, Patent was noted ot be post-ictal, confused, trying to get out of bed, stating he is hungry, uncooperative with answering other questions upon exam in the ED. Patient is still drowsy appearing with no other focal deficits.  In the ED IV Keppra and Depakote were given. CT head was normal. He was admitted to medicine for persistent altered mentation and possible post-ictal state.  ED discussed case with Dr. Jamison -neurology, who recommended increasing Keppra to 1500 mg BID.

## 2020-01-04 NOTE — H&P ADULT - ATTENDING COMMENTS
PHYSICAL EXAM:    CONSTITUTIONAL: NAD, comfortable lying on stretcher  ENMT: EOMI, PERRLA, No tonsillar erythema, exudates, or enlargement, neck supple, No JVD, poor dentition  PSYCH: Alert & Oriented X3, calm  RESPIRATORY: Clear to percussion bilaterally; No rales, rhonchi, wheezing, or rubs  CARDIOVASCULAR: Regular rate and rhythm; No murmurs, rubs, or gallops, negative edema  GASTROINTESTINAL: Soft, Nontender, Nondistended; Bowel sounds present  EXTREMITIES:  2+ Peripheral Pulses, No clubbing, cyanosis  SKIN: No rashes or lesions    33 yo M, poor historian, with PMHx of Schizophrenia, Epilepsy, Hep C and adjustment disorder, recently admitted and discharged on 12/31/19 at [facility] for breakthrough seizure, sent in from [facility] for reported seizure activity, patient denies recollection of event, however recalls being on the floor, was found by room-mate who alerted staff. Patient states he has been complaint with his AEDs. Patient denies head trauma, urinary or bowel incontinence, having bitten down on tongue, denies any other complaints and vocalizes that he "does not want to stay", assured patient he would be discharged pending Neurology evaluation.      #Recurrent Seizures: Awaiting Neurology evaluation, continue current anti epileptic drugs, f/u Keppra and Valproic acid levels, seizure precautions, neuro checks per unit.     #Schizophrenia: continued home medications    #Hepatitis C: Defer to outpatient     Disposition: Return to [facility] once medically stable

## 2020-01-04 NOTE — H&P ADULT - NSHPREVIEWOFSYSTEMS_GEN_ALL_CORE
CONSTITUTIONAL: No weakness, fevers or chills  RESPIRATORY: No cough, wheezing, hemoptysis; No shortness of breath  CARDIOVASCULAR: No chest pain or palpitations  GASTROINTESTINAL: No abdominal or epigastric pain. No nausea, vomiting, or hematemesis; No diarrhea or constipation. No melena or hematochezia.  GENITOURINARY: No dysuria, frequency or hematuria  NEUROLOGICAL: No numbness or weakness  SKIN: No itching, rashes

## 2020-01-04 NOTE — H&P ADULT - ASSESSMENT
34 year old with schizophrenia presenting with unwitnessed seizure from [facility]. 34 year old PMH of schizophrenia, epilepsy, Hep C and adjustment disorder presenting with breakthrough seizure from [facility].       #) Breakthrough seizure  -Check EEG   -Cont Depakote 1000 mg po q12hrs and Keppra 1500 mg po q 12hrs per neurology (inc from 1000 mg BID)  -Continue Klonopin at 0.5 mg q12hr.  -Follow up in the Neuro   -check Keppra and Depakote levels       #)  Schizophrenia  -Continued home medications.    #) Hx of Hepatitis C  -outpatient follow up       DVT Prophylaxis: Heparin    GI ppx: Not indicated     Code status: Full code    Dispo: Likely home in by Monday

## 2020-01-05 LAB
ALBUMIN SERPL ELPH-MCNC: 3.7 G/DL — SIGNIFICANT CHANGE UP (ref 3.5–5.2)
ALP SERPL-CCNC: 21 U/L — LOW (ref 30–115)
ALT FLD-CCNC: 9 U/L — SIGNIFICANT CHANGE UP (ref 0–41)
ANION GAP SERPL CALC-SCNC: 16 MMOL/L — HIGH (ref 7–14)
AST SERPL-CCNC: 12 U/L — SIGNIFICANT CHANGE UP (ref 0–41)
BILIRUB SERPL-MCNC: 0.5 MG/DL — SIGNIFICANT CHANGE UP (ref 0.2–1.2)
BUN SERPL-MCNC: 14 MG/DL — SIGNIFICANT CHANGE UP (ref 10–20)
CALCIUM SERPL-MCNC: 9.2 MG/DL — SIGNIFICANT CHANGE UP (ref 8.5–10.1)
CHLORIDE SERPL-SCNC: 101 MMOL/L — SIGNIFICANT CHANGE UP (ref 98–110)
CO2 SERPL-SCNC: 22 MMOL/L — SIGNIFICANT CHANGE UP (ref 17–32)
CREAT SERPL-MCNC: 1 MG/DL — SIGNIFICANT CHANGE UP (ref 0.7–1.5)
CULTURE RESULTS: SIGNIFICANT CHANGE UP
GLUCOSE SERPL-MCNC: 72 MG/DL — SIGNIFICANT CHANGE UP (ref 70–99)
HCT VFR BLD CALC: 44.1 % — SIGNIFICANT CHANGE UP (ref 42–52)
HGB BLD-MCNC: 15 G/DL — SIGNIFICANT CHANGE UP (ref 14–18)
LACTATE SERPL-SCNC: 0.9 MMOL/L — SIGNIFICANT CHANGE UP (ref 0.7–2)
MAGNESIUM SERPL-MCNC: 1.9 MG/DL — SIGNIFICANT CHANGE UP (ref 1.8–2.4)
MCHC RBC-ENTMCNC: 31.3 PG — HIGH (ref 27–31)
MCHC RBC-ENTMCNC: 34 G/DL — SIGNIFICANT CHANGE UP (ref 32–37)
MCV RBC AUTO: 92.1 FL — SIGNIFICANT CHANGE UP (ref 80–94)
NRBC # BLD: 0 /100 WBCS — SIGNIFICANT CHANGE UP (ref 0–0)
PHOSPHATE SERPL-MCNC: 3.6 MG/DL — SIGNIFICANT CHANGE UP (ref 2.1–4.9)
PLATELET # BLD AUTO: 150 K/UL — SIGNIFICANT CHANGE UP (ref 130–400)
POTASSIUM SERPL-MCNC: 4 MMOL/L — SIGNIFICANT CHANGE UP (ref 3.5–5)
POTASSIUM SERPL-SCNC: 4 MMOL/L — SIGNIFICANT CHANGE UP (ref 3.5–5)
PROT SERPL-MCNC: 6.6 G/DL — SIGNIFICANT CHANGE UP (ref 6–8)
RBC # BLD: 4.79 M/UL — SIGNIFICANT CHANGE UP (ref 4.7–6.1)
RBC # FLD: 11.9 % — SIGNIFICANT CHANGE UP (ref 11.5–14.5)
SODIUM SERPL-SCNC: 139 MMOL/L — SIGNIFICANT CHANGE UP (ref 135–146)
SPECIMEN SOURCE: SIGNIFICANT CHANGE UP
VALPROATE SERPL-MCNC: 99 UG/ML — SIGNIFICANT CHANGE UP (ref 50–100)
WBC # BLD: 6.8 K/UL — SIGNIFICANT CHANGE UP (ref 4.8–10.8)
WBC # FLD AUTO: 6.8 K/UL — SIGNIFICANT CHANGE UP (ref 4.8–10.8)

## 2020-01-05 PROCEDURE — 99223 1ST HOSP IP/OBS HIGH 75: CPT

## 2020-01-05 PROCEDURE — 95819 EEG AWAKE AND ASLEEP: CPT | Mod: 26

## 2020-01-05 RX ADMIN — HEPARIN SODIUM 5000 UNIT(S): 5000 INJECTION INTRAVENOUS; SUBCUTANEOUS at 06:27

## 2020-01-05 RX ADMIN — HEPARIN SODIUM 5000 UNIT(S): 5000 INJECTION INTRAVENOUS; SUBCUTANEOUS at 14:31

## 2020-01-05 RX ADMIN — DIVALPROEX SODIUM 1000 MILLIGRAM(S): 500 TABLET, DELAYED RELEASE ORAL at 06:27

## 2020-01-05 RX ADMIN — Medication 1000 UNIT(S): at 12:23

## 2020-01-05 RX ADMIN — LEVETIRACETAM 1500 MILLIGRAM(S): 250 TABLET, FILM COATED ORAL at 06:27

## 2020-01-05 RX ADMIN — Medication 0.5 MILLIGRAM(S): at 06:27

## 2020-01-05 RX ADMIN — DIVALPROEX SODIUM 1000 MILLIGRAM(S): 500 TABLET, DELAYED RELEASE ORAL at 17:27

## 2020-01-05 RX ADMIN — LEVETIRACETAM 1500 MILLIGRAM(S): 250 TABLET, FILM COATED ORAL at 17:27

## 2020-01-05 RX ADMIN — Medication 0.5 MILLIGRAM(S): at 17:26

## 2020-01-05 NOTE — CHART NOTE - NSCHARTNOTEFT_GEN_A_CORE
Patient sleepy but arousable-- awaiting neurology recommendations-- we have recalled them.  EEG did not show epileptiform activity

## 2020-01-06 ENCOUNTER — TRANSCRIPTION ENCOUNTER (OUTPATIENT)
Age: 35
End: 2020-01-06

## 2020-01-06 VITALS — HEART RATE: 50 BPM | DIASTOLIC BLOOD PRESSURE: 60 MMHG | SYSTOLIC BLOOD PRESSURE: 102 MMHG

## 2020-01-06 DIAGNOSIS — F20.9 SCHIZOPHRENIA, UNSPECIFIED: ICD-10-CM

## 2020-01-06 DIAGNOSIS — G40.909 EPILEPSY, UNSPECIFIED, NOT INTRACTABLE, WITHOUT STATUS EPILEPTICUS: ICD-10-CM

## 2020-01-06 DIAGNOSIS — F43.20 ADJUSTMENT DISORDER, UNSPECIFIED: ICD-10-CM

## 2020-01-06 DIAGNOSIS — B18.2 CHRONIC VIRAL HEPATITIS C: ICD-10-CM

## 2020-01-06 LAB
ANION GAP SERPL CALC-SCNC: 17 MMOL/L — HIGH (ref 7–14)
BUN SERPL-MCNC: 19 MG/DL — SIGNIFICANT CHANGE UP (ref 10–20)
CALCIUM SERPL-MCNC: 9.4 MG/DL — SIGNIFICANT CHANGE UP (ref 8.5–10.1)
CHLORIDE SERPL-SCNC: 102 MMOL/L — SIGNIFICANT CHANGE UP (ref 98–110)
CO2 SERPL-SCNC: 22 MMOL/L — SIGNIFICANT CHANGE UP (ref 17–32)
CREAT SERPL-MCNC: 1 MG/DL — SIGNIFICANT CHANGE UP (ref 0.7–1.5)
GLUCOSE SERPL-MCNC: 71 MG/DL — SIGNIFICANT CHANGE UP (ref 70–99)
HCT VFR BLD CALC: 44.2 % — SIGNIFICANT CHANGE UP (ref 42–52)
HGB BLD-MCNC: 15.3 G/DL — SIGNIFICANT CHANGE UP (ref 14–18)
MAGNESIUM SERPL-MCNC: 1.8 MG/DL — SIGNIFICANT CHANGE UP (ref 1.8–2.4)
MCHC RBC-ENTMCNC: 31.7 PG — HIGH (ref 27–31)
MCHC RBC-ENTMCNC: 34.6 G/DL — SIGNIFICANT CHANGE UP (ref 32–37)
MCV RBC AUTO: 91.7 FL — SIGNIFICANT CHANGE UP (ref 80–94)
NRBC # BLD: 0 /100 WBCS — SIGNIFICANT CHANGE UP (ref 0–0)
PLATELET # BLD AUTO: 174 K/UL — SIGNIFICANT CHANGE UP (ref 130–400)
POTASSIUM SERPL-MCNC: 4.4 MMOL/L — SIGNIFICANT CHANGE UP (ref 3.5–5)
POTASSIUM SERPL-SCNC: 4.4 MMOL/L — SIGNIFICANT CHANGE UP (ref 3.5–5)
RBC # BLD: 4.82 M/UL — SIGNIFICANT CHANGE UP (ref 4.7–6.1)
RBC # FLD: 11.9 % — SIGNIFICANT CHANGE UP (ref 11.5–14.5)
SODIUM SERPL-SCNC: 141 MMOL/L — SIGNIFICANT CHANGE UP (ref 135–146)
WBC # BLD: 6.64 K/UL — SIGNIFICANT CHANGE UP (ref 4.8–10.8)
WBC # FLD AUTO: 6.64 K/UL — SIGNIFICANT CHANGE UP (ref 4.8–10.8)

## 2020-01-06 PROCEDURE — 99233 SBSQ HOSP IP/OBS HIGH 50: CPT

## 2020-01-06 PROCEDURE — 99254 IP/OBS CNSLTJ NEW/EST MOD 60: CPT

## 2020-01-06 RX ORDER — LEVETIRACETAM 250 MG/1
1.5 TABLET, FILM COATED ORAL
Qty: 0 | Refills: 0 | DISCHARGE
Start: 2020-01-06

## 2020-01-06 RX ADMIN — Medication 1000 UNIT(S): at 12:30

## 2020-01-06 RX ADMIN — Medication 0.5 MILLIGRAM(S): at 05:45

## 2020-01-06 RX ADMIN — HEPARIN SODIUM 5000 UNIT(S): 5000 INJECTION INTRAVENOUS; SUBCUTANEOUS at 05:45

## 2020-01-06 RX ADMIN — LEVETIRACETAM 1500 MILLIGRAM(S): 250 TABLET, FILM COATED ORAL at 05:45

## 2020-01-06 RX ADMIN — DIVALPROEX SODIUM 1000 MILLIGRAM(S): 500 TABLET, DELAYED RELEASE ORAL at 05:44

## 2020-01-06 RX ADMIN — DIVALPROEX SODIUM 1000 MILLIGRAM(S): 500 TABLET, DELAYED RELEASE ORAL at 17:59

## 2020-01-06 RX ADMIN — LEVETIRACETAM 1500 MILLIGRAM(S): 250 TABLET, FILM COATED ORAL at 17:59

## 2020-01-06 RX ADMIN — HEPARIN SODIUM 5000 UNIT(S): 5000 INJECTION INTRAVENOUS; SUBCUTANEOUS at 13:57

## 2020-01-06 RX ADMIN — Medication 0.5 MILLIGRAM(S): at 18:03

## 2020-01-06 NOTE — CONSULT NOTE ADULT - SUBJECTIVE AND OBJECTIVE BOX
Neurology Consult    Patient is a 34y old  Male who presents with a chief complaint of Seizure (2020 15:36)      HPI:  34 year old with schizophrenia presenting following witnessed seizure from [facility] ([facility]).  He was discharged from [facility] on 19 for breakthrough seizure. During that time He was placed on veeg for several days and aed's were adjusted (Depakote and Keppra were both made 1000 mg bid ) as per neurology due to lethargy.  Patient is a very poor historian, often shouting one word answers. Per notes when the patient presented to the ED, Patent was noted ot be post-ictal, confused, trying to get out of bed, stating he is hungry, uncooperative with answering other questions upon exam in the ED. Patient is still drowsy appearing with no other focal deficits.  In the ED IV Keppra and Depakote were given. CT head was normal. He was admitted to medicine for persistent altered mentation and possible post-ictal state.  ED discussed case with Dr. Jamison -neurology, who recommended increasing Keppra to 1500 mg BID.      PAST MEDICAL & SURGICAL HISTORY:  Hep C w/o coma, chronic  Schizophrenia  Seizure  No significant past surgical history      FAMILY HISTORY:  No pertinent family history in first degree relatives      Social History: (-) x 3    Allergies    No Known Allergies    Intolerances        MEDICATIONS  (STANDING):  chlorhexidine 4% Liquid 1 Application(s) Topical <User Schedule>  cholecalciferol 1000 Unit(s) Oral daily  clonazePAM  Tablet 0.5 milliGRAM(s) Oral two times a day  diVALproex DR 1000 milliGRAM(s) Oral two times a day  heparin  Injectable 5000 Unit(s) SubCutaneous every 8 hours  levETIRAcetam 1500 milliGRAM(s) Oral two times a day    MEDICATIONS  (PRN):      Review of systems:        Vital Signs Last 24 Hrs  T(C): 36.7 (2020 00:15), Max: 36.7 (2020 08:31)  T(F): 98 (2020 00:15), Max: 98.1 (2020 16:39)  HR: 46 (2020 00:15) (46 - 55)  BP: 103/58 (2020 00:15) (103/58 - 108/64)  BP(mean): --  RR: 18 (2020 00:15) (18 - 19)  SpO2: 97% (2020 00:15) (96% - 97%)    Examination:  General:  Appearance is consistent with chronologic age.  No abnormal facies.  Gross skin survey within normal limits.    Cognitive/Language:  The patient is lethargic able to tell me place and his name. Nondysarthric.    Eyes: intact VA, VFF.  EOMI w/o nystagmus, skew or reported double vision.  PERRL.  No ptosis/weakness of eyelid closure.    Face:  Facial sensation normal V1 - 3, no facial asymmetry.    Ears/Nose/Throat:  Hearing grossly intact b/l.  Palate elevates midline.  Tongue and uvula midline.   Motor examination:   Normal tone, bulk and range of motion.  Formal Muscle Strength Testing: (MRC grade R/L) 5/5 UE; 5/5 LE.  No observable drift.  Sensory examination:   Intact to light touch and pinprick, pain, temperature and proprioception and vibration in all extremities.  Cerebellum:   FTN/HKS intact with normal KALIE in all limbs.  No dysmetria or dysdiadokinesia.          Labs:   CBC Full  -  ( 2020 07:36 )  WBC Count : 6.80 K/uL  RBC Count : 4.79 M/uL  Hemoglobin : 15.0 g/dL  Hematocrit : 44.1 %  Platelet Count - Automated : 150 K/uL  Mean Cell Volume : 92.1 fL  Mean Cell Hemoglobin : 31.3 pg  Mean Cell Hemoglobin Concentration : 34.0 g/dL  Auto Neutrophil # : x  Auto Lymphocyte # : x  Auto Monocyte # : x  Auto Eosinophil # : x  Auto Basophil # : x  Auto Neutrophil % : x  Auto Lymphocyte % : x  Auto Monocyte % : x  Auto Eosinophil % : x  Auto Basophil % : x    -    139  |  101  |  14  ----------------------------<  72  4.0   |  22  |  1.0    Ca    9.2      2020 07:36  Phos  3.6     01-05  Mg     1.9     -05    TPro  6.6  /  Alb  3.7  /  TBili  0.5  /  DBili  x   /  AST  12  /  ALT  9   /  AlkPhos  21<L>  -05    LIVER FUNCTIONS - ( 2020 07:36 )  Alb: 3.7 g/dL / Pro: 6.6 g/dL / ALK PHOS: 21 U/L / ALT: 9 U/L / AST: 12 U/L / GGT: x             Urinalysis Basic - ( 2020 14:30 )    Color: Yellow / Appearance: Clear / S.030 / pH: x  Gluc: x / Ketone: Small  / Bili: Negative / Urobili: <2 mg/dL   Blood: x / Protein: 30 mg/dL / Nitrite: Negative   Leuk Esterase: Negative / RBC: 1 /HPF / WBC 1 /HPF   Sq Epi: x / Non Sq Epi: 0 /HPF / Bacteria: Negative          Neuroimaging:  NCHCT:   < from: CT Head No Cont (20 @ 11:52) >  Impression: No evidence of acute hemorrhage, mass or mass effect.        20 @ 01:41 Neurology Consult    Patient is a 34y old  Male who presents with a chief complaint of Seizure (2020 15:36)    HPI:  34 year old with schizophrenia presenting following witnessed seizure from [facility] ([facility]).  He was discharged from [facility] on 19 for breakthrough seizure. During that time He was placed on veeg for several days and aed's were adjusted (Depakote and Keppra were both made 1000 mg bid ) as per neurology due to lethargy.  Patient is a very poor historian, often shouting one word answers. Per notes when the patient presented to the ED, Patent was noted ot be post-ictal, confused, trying to get out of bed, stating he is hungry, uncooperative with answering other questions upon exam in the ED. Patient is still drowsy appearing with no other focal deficits.  In the ED IV Keppra and Depakote were given. CT head was normal. He was admitted to medicine for persistent altered mentation and possible post-ictal state.  ED discussed case with Dr. Jamison -neurology, who recommended increasing Keppra to 1500 mg BID.    Pt currently states that he's been compliant with medications.  States that his roommate found him seizing.  ? substance abuse during this time.  Spoke w/ Aide- unclear if pt compliant with his medications since can't guarantee he takes medications if he doesn't come outside of his room according to "policy."  No documented witnessed seizure by [facility] staff.  Currently back to baseline.    PAST MEDICAL & SURGICAL HISTORY:  Hep C w/o coma, chronic  Schizophrenia  Seizure  No significant past surgical history    FAMILY HISTORY:  No pertinent family history in first degree relatives      Social History: (-) x 3    Allergies  No Known Allergies    Intolerances    MEDICATIONS  (STANDING):  chlorhexidine 4% Liquid 1 Application(s) Topical <User Schedule>  cholecalciferol 1000 Unit(s) Oral daily  clonazePAM  Tablet 0.5 milliGRAM(s) Oral two times a day  diVALproex DR 1000 milliGRAM(s) Oral two times a day  heparin  Injectable 5000 Unit(s) SubCutaneous every 8 hours  levETIRAcetam 1500 milliGRAM(s) Oral two times a day    MEDICATIONS  (PRN):      Review of systems:      Vital Signs Last 24 Hrs  T(C): 36.7 (2020 00:15), Max: 36.7 (2020 08:31)  T(F): 98 (2020 00:15), Max: 98.1 (2020 16:39)  HR: 46 (2020 00:15) (46 - 55)  BP: 103/58 (2020 00:15) (103/58 - 108/64)  BP(mean): --  RR: 18 (2020 00:15) (18 - 19)  SpO2: 97% (2020 00:15) (96% - 97%)    Examination:  General:  Appearance is consistent with chronologic age.  No abnormal facies.  Gross skin survey within normal limits.    Cognitive/Language:  The patient is lethargic able to tell me place and his name. Nondysarthric.    Eyes: intact VA, VFF.  EOMI w/o nystagmus, skew or reported double vision.  PERRL.  No ptosis/weakness of eyelid closure.    Face:  Facial sensation normal V1 - 3, no facial asymmetry.    Ears/Nose/Throat:  Hearing grossly intact b/l.  Palate elevates midline.  Tongue and uvula midline.   Motor examination:   Normal tone, bulk and range of motion.  Formal Muscle Strength Testing: (MRC grade R/L) 5/5 UE; 5/5 LE.  No observable drift.  Sensory examination:   Intact to light touch and pinprick, pain, temperature and proprioception and vibration in all extremities.  Cerebellum:   FTN/HKS intact with normal KALIE in all limbs.  No dysmetria or dysdiadokinesia.      Labs:   CBC Full  -  ( 2020 07:36 )  WBC Count : 6.80 K/uL  RBC Count : 4.79 M/uL  Hemoglobin : 15.0 g/dL  Hematocrit : 44.1 %  Platelet Count - Automated : 150 K/uL  Mean Cell Volume : 92.1 fL  Mean Cell Hemoglobin : 31.3 pg  Mean Cell Hemoglobin Concentration : 34.0 g/dL        139  |  101  |  14  ----------------------------<  72  4.0   |  22  |  1.0    Ca    9.2      2020 07:36  Phos  3.6     01-05  Mg     1.9     -05    TPro  6.6  /  Alb  3.7  /  TBili  0.5  /  DBili  x   /  AST  12  /  ALT  9   /  AlkPhos  21<L>      LIVER FUNCTIONS - ( 2020 07:36 )  Alb: 3.7 g/dL / Pro: 6.6 g/dL / ALK PHOS: 21 U/L / ALT: 9 U/L / AST: 12 U/L / GGT: x           Urinalysis Basic - ( 2020 14:30 )    Color: Yellow / Appearance: Clear / S.030 / pH: x  Gluc: x / Ketone: Small  / Bili: Negative / Urobili: <2 mg/dL   Blood: x / Protein: 30 mg/dL / Nitrite: Negative   Leuk Esterase: Negative / RBC: 1 /HPF / WBC 1 /HPF   Sq Epi: x / Non Sq Epi: 0 /HPF / Bacteria: Negative    Neuroimaging:  NCHCT:   < from: CT Head No Cont (20 @ 11:52) >  Impression: No evidence of acute hemorrhage, mass or mass effect.    20 @ 01:41    < from: EEG (20 @ 15:30) >  Impression  Abnormal due to the presence of: focal slowing as above    Clinical Correlation & Recommendations   Consistent with focal electrophysiological dysfunction, secondary to nonspecific cause.    TYLER HESTER  This document has been electronically signed. 2020  6:14PM    < end of copied text >

## 2020-01-06 NOTE — DISCHARGE NOTE NURSING/CASE MANAGEMENT/SOCIAL WORK - PATIENT PORTAL LINK FT
You can access the FollowMyHealth Patient Portal offered by [facility] by registering at the following website: [email or web address]. By joining [employer]’s FollowMyHealth portal, you will also be able to view your health information using other applications (apps) compatible with our system.

## 2020-01-06 NOTE — PROGRESS NOTE ADULT - ATTENDING COMMENTS
Patient seen and examined independently. I agree with the resident's note, physical exam, and plan except as below.  Vital Signs Last 24 Hrs  T(C): 36.2 (06 Jan 2020 07:52), Max: 36.7 (05 Jan 2020 16:39)  T(F): 97.2 (06 Jan 2020 07:52), Max: 98.1 (05 Jan 2020 16:39)  HR: 48 (06 Jan 2020 07:52) (46 - 51)  BP: 102/59 (06 Jan 2020 07:52) (102/59 - 108/64)  BP(mean): --  RR: 18 (06 Jan 2020 07:52) (18 - 19)  SpO2: 98% (06 Jan 2020 07:52) (97% - 98%)  Pe  nad  slow slurred speech at baseline  [id number]  ctabl  soft ntnd+bs   no cce  nonfocal    #break trough seizures - was recently admitted to [facility] to epilepsy unit and meds were adjusted  spoke with Dr Muñoz - believes there is noncompliance with meds  from [place] [facility]  cont meds rec by neuro   keppra, klonipin, depakote  can return back to SBP with outpr neuro followup Patient seen and examined independently. I agree with the resident's note, physical exam, and plan except as below.  Vital Signs Last 24 Hrs  T(C): 36.2 (06 Jan 2020 07:52), Max: 36.7 (05 Jan 2020 16:39)  T(F): 97.2 (06 Jan 2020 07:52), Max: 98.1 (05 Jan 2020 16:39)  HR: 48 (06 Jan 2020 07:52) (46 - 51)  BP: 102/59 (06 Jan 2020 07:52) (102/59 - 108/64)  BP(mean): --  RR: 18 (06 Jan 2020 07:52) (18 - 19)  SpO2: 98% (06 Jan 2020 07:52) (97% - 98%)  Pe  nad  slow slurred speech at baseline  [id number]  ctabl  soft ntnd+bs   no cce  nonfocal    #break trough seizures - was recently admitted to [facility] to epilepsy unit and meds were adjusted  spoke with Dr Muñoz - believes there is noncompliance with meds  from [place] [facility]  cont meds rec by neuro   keppra, klonipin, depakote  can return back to SBP with outpr neuro followup  advised resident of error in dc med rec - keppra should be 1500 q12 - resident to fix and reprint for dc to ensure SBP aware

## 2020-01-06 NOTE — DISCHARGE NOTE PROVIDER - HOSPITAL COURSE
34 year old with schizophrenia presenting following witnessed seizure from [address] ([facility]).  He was discharged from [facility] on 12/27/19 for breakthrough seizure. During that time He was placed on veeg for several days and aed's were adjusted (Depakote and Keppra )  as per neurology due to lethargy.  Patient is a very poor historian, often shouting one word answers. Per notes when the patient presented to the ED, Patent was noted ot be post-ictal, confused, trying to get out of bed, stating he is hungry, uncooperative with answering other questions upon exam in the ED. Patient is still drowsy appearing with no other focal deficits.  In the ED IV Keppra and Depakote were given. CT head was normal. He was admitted to medicine for persistent altered mentation and possible post-ictal state.  ED discussed case with Dr. Jamison -neurology, who recommended increasing Keppra to 1500 mg BID.         Patient evaluated by neurology.  At this time continue medication as prescribed.

## 2020-01-06 NOTE — DISCHARGE NOTE PROVIDER - NSDCMRMEDTOKEN_GEN_ALL_CORE_FT
clonazePAM 0.5 mg oral tablet: 1 tab(s) orally 2 times a day MDD:2  divalproex sodium 500 mg oral delayed release tablet: 2 tab(s) orally 2 times a day  levETIRAcetam 1000 mg oral tablet: 1 tab(s) orally 2 times a day  RisperDAL Consta 37.5 mg/2 weeks intramuscular injection, extended release:   Vitamin D3 1000 intl units (25 mcg) oral tablet: 1 tab(s) orally once a day

## 2020-01-06 NOTE — DISCHARGE NOTE PROVIDER - NSDCCPCAREPLAN_GEN_ALL_CORE_FT
PRINCIPAL DISCHARGE DIAGNOSIS  Diagnosis: Seizure  Assessment and Plan of Treatment: C/w keppra and depakote as prescribed.

## 2020-01-06 NOTE — DISCHARGE NOTE PROVIDER - CARE PROVIDER_API CALL
Ramón Muñoz)  Neuromuscular Medicine  [address] [place] [address], [address]  [place], [place] [id number]  Phone: [phone]  Fax: [phone]  Follow Up Time:

## 2020-01-06 NOTE — CONSULT NOTE ADULT - ASSESSMENT
Impression:  34 year old with schizophrenia presenting following witnessed seizure from [address] ([facility]).  He was discharged from [facility] on 12/27/19 for breakthrough seizure. During that time He was placed on veeg for several days and aed's were adjusted (Depakote and Keppra were both made 1000 mg bid ) as per neurology due to lethargy.  In the ED IV Keppra and Depakote were given. CT head was normal. He was admitted to medicine for persistent altered mentation and possible post-ictal state.  Last admission compliance was an issue because he is so lethargic he misses AEDS.    Suggestion:  Routine EEG  Continue Klonipin.  Continue Keppra and Depakote.   Seizure precautions.   VPA and keppra levels please.     John Mcdonald NP  [phone] 34 year old with schizophrenia presenting following witnessed seizure from [address] ([facility]).  He was discharged from [facility] on 12/27/19 for breakthrough seizure. During that time He was placed on veeg for several days and aed's were adjusted (Depakote and Keppra were both made 1000 mg bid ) as per neurology due to lethargy.  In the ED IV Keppra and Depakote were given. CT head was normal. He was admitted to medicine for persistent altered mentation and possible post-ictal state.  Last admission compliance was an issue because he is so lethargic he misses AEDS.    Suggestion:  Continue Klonipin as per psychiatry  Continue Depakote 1000 mg BID  continue Keppra 1500 mg BID  reiterated importance of compliance with medications  Seizure precautions.   no further inpt neurologic w/u  f/u as outpt in 2 - 4 wks

## 2020-01-06 NOTE — PROGRESS NOTE ADULT - SUBJECTIVE AND OBJECTIVE BOX
LENGTH OF HOSPITAL STAY: 2d    CHIEF COMPLAINT:   Patient is a 34y old  Male who presents with a chief complaint of Seizure (2020 01:40)      Overnight events:    No acute events overnight    ALLERGIES:  No Known Allergies    MEDICATIONS:  STANDING MEDICATIONS  chlorhexidine 4% Liquid 1 Application(s) Topical <User Schedule>  cholecalciferol 1000 Unit(s) Oral daily  clonazePAM  Tablet 0.5 milliGRAM(s) Oral two times a day  diVALproex DR 1000 milliGRAM(s) Oral two times a day  heparin  Injectable 5000 Unit(s) SubCutaneous every 8 hours  levETIRAcetam 1500 milliGRAM(s) Oral two times a day    PRN MEDICATIONS    VITALS:   T(F): 97.2  HR: 48  BP: 102/59  RR: 18  SpO2: 98%    LABS:                        15.3   6.64  )-----------( 174      ( 2020 07:15 )             44.2     01-06    141  |  102  |  19  ----------------------------<  71  4.4   |  22  |  1.0    Ca    9.4      2020 07:15  Phos  3.6     01-05  Mg     1.8     01-06    TPro  6.6  /  Alb  3.7  /  TBili  0.5  /  DBili  x   /  AST  12  /  ALT  9   /  AlkPhos  21<L>  01-05      Urinalysis Basic - ( 2020 14:30 )    Color: Yellow / Appearance: Clear / S.030 / pH: x  Gluc: x / Ketone: Small  / Bili: Negative / Urobili: <2 mg/dL   Blood: x / Protein: 30 mg/dL / Nitrite: Negative   Leuk Esterase: Negative / RBC: 1 /HPF / WBC 1 /HPF   Sq Epi: x / Non Sq Epi: 0 /HPF / Bacteria: Negative            Culture - Urine (collected 2020 14:30)  Source: .Urine Clean Catch (Midstream)  Final Report (2020 20:52):    <10,000 CFU/mL Normal Urogenital Yi            Cultures:    Culture - Urine (collected 2020 14:30)  Source: .Urine Clean Catch (Midstream)  Final Report (2020 20:52):    <10,000 CFU/mL Normal Urogenital Yi        PHYSICAL EXAM:  GENERAL: NAD, well-developed  PSYCH: AAOx3, Agitated   HEENT:  Atraumatic, Normocephalic. EOMI, PERRLA, conjunctiva clear, sclera white, No JVD  PULMONARY: Clear to auscultation bilaterally; No wheeze  CARDIOVASCULAR: Regular rate and rhythm; No murmurs, rubs, or gallops  GASTROINTESTINAL: Soft, Nontender, Nondistended; Bowel sounds present  MUSCULOSKELETAL:  2+ Peripheral Pulses, No clubbing, cyanosis, or edema  NEUROLOGY: non-focal  SKIN: No rashes or lesions

## 2020-01-06 NOTE — CONSULT NOTE ADULT - ATTENDING COMMENTS
I have personally seen and examined this patient.  I have fully participated in the care of this patient.  I have reviewed all pertinent clinical information, including history, physical exam, plan and note.   I have reviewed all pertinent clinical information and reviewed all relevant imaging and diagnostic studies personally.  Recommendations as above.  Agree with above assessment except as noted.

## 2020-01-06 NOTE — PROGRESS NOTE ADULT - ASSESSMENT
34 year old PMH of schizophrenia, epilepsy, Hep C and adjustment disorder presenting with breakthrough seizure from [facility].       # Breakthrough seizure  -Check EEG   -Cont Depakote 1000 mg po q12hrs and Keppra 1500 mg po q 12hrs per neurology (inc from 1000 mg BID)  -Continue Klonopin at 0.5 mg q12hr.  -Follow up in the Neuro   -levels wnml    # Schizophrenia  -Continued home medications.    # Hx of Hepatitis C  -outpatient follow up     Activity: as tolerated  DVT Prophylaxis: Heparin  GI ppx: Not indicated   Full code  Dispo: from [facility]

## 2020-01-07 LAB — LEVETIRACETAM SERPL-MCNC: 43.5 MCG/ML — SIGNIFICANT CHANGE UP (ref 12–46)

## 2020-01-10 ENCOUNTER — EMERGENCY (EMERGENCY)
Facility: HOSPITAL | Age: 35
LOS: 0 days | Discharge: HOME | End: 2020-01-10
Attending: EMERGENCY MEDICINE | Admitting: EMERGENCY MEDICINE
Payer: MEDICAID

## 2020-01-10 VITALS
OXYGEN SATURATION: 99 % | DIASTOLIC BLOOD PRESSURE: 79 MMHG | HEART RATE: 62 BPM | SYSTOLIC BLOOD PRESSURE: 119 MMHG | RESPIRATION RATE: 16 BRPM | TEMPERATURE: 98 F

## 2020-01-10 VITALS
OXYGEN SATURATION: 97 % | RESPIRATION RATE: 18 BRPM | HEART RATE: 91 BPM | SYSTOLIC BLOOD PRESSURE: 119 MMHG | DIASTOLIC BLOOD PRESSURE: 79 MMHG

## 2020-01-10 DIAGNOSIS — G40.909 EPILEPSY, UNSPECIFIED, NOT INTRACTABLE, WITHOUT STATUS EPILEPTICUS: ICD-10-CM

## 2020-01-10 DIAGNOSIS — Z91.14 PATIENT'S OTHER NONCOMPLIANCE WITH MEDICATION REGIMEN: ICD-10-CM

## 2020-01-10 DIAGNOSIS — R56.9 UNSPECIFIED CONVULSIONS: ICD-10-CM

## 2020-01-10 DIAGNOSIS — F20.9 SCHIZOPHRENIA, UNSPECIFIED: ICD-10-CM

## 2020-01-10 DIAGNOSIS — F43.20 ADJUSTMENT DISORDER, UNSPECIFIED: ICD-10-CM

## 2020-01-10 DIAGNOSIS — B18.2 CHRONIC VIRAL HEPATITIS C: ICD-10-CM

## 2020-01-10 LAB
ALBUMIN SERPL ELPH-MCNC: 4.3 G/DL — SIGNIFICANT CHANGE UP (ref 3.5–5.2)
ALP SERPL-CCNC: 21 U/L — LOW (ref 30–115)
ALT FLD-CCNC: 11 U/L — SIGNIFICANT CHANGE UP (ref 0–41)
ANION GAP SERPL CALC-SCNC: 13 MMOL/L — SIGNIFICANT CHANGE UP (ref 7–14)
APTT BLD: 24.4 SEC — LOW (ref 27–39.2)
AST SERPL-CCNC: 22 U/L — SIGNIFICANT CHANGE UP (ref 0–41)
BASOPHILS # BLD AUTO: 0.01 K/UL — SIGNIFICANT CHANGE UP (ref 0–0.2)
BASOPHILS NFR BLD AUTO: 0.1 % — SIGNIFICANT CHANGE UP (ref 0–1)
BILIRUB SERPL-MCNC: 0.3 MG/DL — SIGNIFICANT CHANGE UP (ref 0.2–1.2)
BUN SERPL-MCNC: 10 MG/DL — SIGNIFICANT CHANGE UP (ref 10–20)
CALCIUM SERPL-MCNC: 9.4 MG/DL — SIGNIFICANT CHANGE UP (ref 8.5–10.1)
CHLORIDE SERPL-SCNC: 101 MMOL/L — SIGNIFICANT CHANGE UP (ref 98–110)
CO2 SERPL-SCNC: 26 MMOL/L — SIGNIFICANT CHANGE UP (ref 17–32)
CREAT SERPL-MCNC: 1.2 MG/DL — SIGNIFICANT CHANGE UP (ref 0.7–1.5)
EOSINOPHIL # BLD AUTO: 0.03 K/UL — SIGNIFICANT CHANGE UP (ref 0–0.7)
EOSINOPHIL NFR BLD AUTO: 0.4 % — SIGNIFICANT CHANGE UP (ref 0–8)
GLUCOSE SERPL-MCNC: 74 MG/DL — SIGNIFICANT CHANGE UP (ref 70–99)
HCT VFR BLD CALC: 46.2 % — SIGNIFICANT CHANGE UP (ref 42–52)
HGB BLD-MCNC: 16 G/DL — SIGNIFICANT CHANGE UP (ref 14–18)
IMM GRANULOCYTES NFR BLD AUTO: 0.5 % — HIGH (ref 0.1–0.3)
INR BLD: 1.1 RATIO — SIGNIFICANT CHANGE UP (ref 0.65–1.3)
LYMPHOCYTES # BLD AUTO: 2 K/UL — SIGNIFICANT CHANGE UP (ref 1.2–3.4)
LYMPHOCYTES # BLD AUTO: 24.2 % — SIGNIFICANT CHANGE UP (ref 20.5–51.1)
MCHC RBC-ENTMCNC: 32.9 PG — HIGH (ref 27–31)
MCHC RBC-ENTMCNC: 34.6 G/DL — SIGNIFICANT CHANGE UP (ref 32–37)
MCV RBC AUTO: 94.9 FL — HIGH (ref 80–94)
MONOCYTES # BLD AUTO: 0.74 K/UL — HIGH (ref 0.1–0.6)
MONOCYTES NFR BLD AUTO: 9 % — SIGNIFICANT CHANGE UP (ref 1.7–9.3)
NEUTROPHILS # BLD AUTO: 5.43 K/UL — SIGNIFICANT CHANGE UP (ref 1.4–6.5)
NEUTROPHILS NFR BLD AUTO: 65.8 % — SIGNIFICANT CHANGE UP (ref 42.2–75.2)
NRBC # BLD: 0 /100 WBCS — SIGNIFICANT CHANGE UP (ref 0–0)
PLATELET # BLD AUTO: 150 K/UL — SIGNIFICANT CHANGE UP (ref 130–400)
POTASSIUM SERPL-MCNC: 4.5 MMOL/L — SIGNIFICANT CHANGE UP (ref 3.5–5)
POTASSIUM SERPL-SCNC: 4.5 MMOL/L — SIGNIFICANT CHANGE UP (ref 3.5–5)
PROT SERPL-MCNC: 7.6 G/DL — SIGNIFICANT CHANGE UP (ref 6–8)
PROTHROM AB SERPL-ACNC: 12.6 SEC — SIGNIFICANT CHANGE UP (ref 9.95–12.87)
RBC # BLD: 4.87 M/UL — SIGNIFICANT CHANGE UP (ref 4.7–6.1)
RBC # FLD: 12.3 % — SIGNIFICANT CHANGE UP (ref 11.5–14.5)
SODIUM SERPL-SCNC: 140 MMOL/L — SIGNIFICANT CHANGE UP (ref 135–146)
TROPONIN T SERPL-MCNC: <0.01 NG/ML — SIGNIFICANT CHANGE UP
VALPROATE SERPL-MCNC: 68 UG/ML — SIGNIFICANT CHANGE UP (ref 50–100)
WBC # BLD: 8.25 K/UL — SIGNIFICANT CHANGE UP (ref 4.8–10.8)
WBC # FLD AUTO: 8.25 K/UL — SIGNIFICANT CHANGE UP (ref 4.8–10.8)

## 2020-01-10 PROCEDURE — 93010 ELECTROCARDIOGRAM REPORT: CPT

## 2020-01-10 PROCEDURE — 99285 EMERGENCY DEPT VISIT HI MDM: CPT

## 2020-01-10 PROCEDURE — 71045 X-RAY EXAM CHEST 1 VIEW: CPT | Mod: 26

## 2020-01-10 PROCEDURE — 70450 CT HEAD/BRAIN W/O DYE: CPT | Mod: 26

## 2020-01-10 RX ORDER — LEVETIRACETAM 250 MG/1
1000 TABLET, FILM COATED ORAL ONCE
Refills: 0 | Status: COMPLETED | OUTPATIENT
Start: 2020-01-10 | End: 2020-01-10

## 2020-01-10 RX ORDER — DIVALPROEX SODIUM 500 MG/1
1000 TABLET, DELAYED RELEASE ORAL ONCE
Refills: 0 | Status: COMPLETED | OUTPATIENT
Start: 2020-01-10 | End: 2020-01-10

## 2020-01-10 RX ORDER — SODIUM CHLORIDE 9 MG/ML
1000 INJECTION, SOLUTION INTRAVENOUS ONCE
Refills: 0 | Status: COMPLETED | OUTPATIENT
Start: 2020-01-10 | End: 2020-01-10

## 2020-01-10 RX ADMIN — DIVALPROEX SODIUM 1000 MILLIGRAM(S): 500 TABLET, DELAYED RELEASE ORAL at 15:35

## 2020-01-10 RX ADMIN — LEVETIRACETAM 1000 MILLIGRAM(S): 250 TABLET, FILM COATED ORAL at 15:35

## 2020-01-10 RX ADMIN — SODIUM CHLORIDE 1000 MILLILITER(S): 9 INJECTION, SOLUTION INTRAVENOUS at 12:32

## 2020-01-10 NOTE — ED PROVIDER NOTE - CLINICAL SUMMARY MEDICAL DECISION MAKING FREE TEXT BOX
Patient presented s/p seizure episode from psych facility. Otherwise afebrile, HD stable, neurovascularly intact. However, it was initially unclear if patient has history of seizures since no prior records were found for patient. Obtained labs which were grossly unremarkable including no significant leukocytosis, anemia, signs of dehydration/MARTIR, transaminitis or significant electrolyte abnormalities. Chest xray negative for pneumothorax, pneumonia, widened mediastinum, evidence of rib fractures, enlarged cardiac silhouette or any other emergent pathologies. CT head negative for hemorrhage, CVA, mass or any other emergent pathologies. Spoke with neurology who knows patient well - states patient has multiple MRNs and that is why there is no record of him in the current EMR. Says patient has hx of seizures. recommends loading with keppra and dilantin, outpatient follow up. Patient given meds and monitored without recurrent seizure activity during ED course. Will discharge home with outpatient neuro follow up. Patient agreeable with plan. Agrees to return to ED for any new or worsening symptoms.

## 2020-01-10 NOTE — ED PROVIDER NOTE - ATTENDING CONTRIBUTION TO CARE
34 year old male, presenting from psych facility with (+) seizure. Patient is awake but not cooperative with history taking and therefore history is limited. Per psych facility papers, patient with episode of shaking and seizure-like activity that resolved spontaneously. It is unclear if patient has a history of seizures. Patient denies active complaints at this time.    Vital Signs: I have reviewed the initial vital signs.  Constitutional: NAD, well-nourished, appears stated age, no acute distress.  HEENT: Airway patent, moist MM, no erythema/swelling/deformity of oral structures. EOMI, PERRLA.  CV: regular rate, regular rhythm, well-perfused extremities, 2+ b/l DP and radial pulses equal.  Lungs: BCTA, no increased WOB.  ABD: NTND, no guarding or rebound, no pulsatile mass, no hernias.   MSK: Neck supple, nontender, nl ROM, no stepoff. Chest nontender. Back nontender in TLS spine or to b/l bony structures or flanks. Ext nontender, nl rom, no deformity.   INTEG: Skin warm, dry, no rash.  NEURO: A&Ox3, normal strength, nl sensation throughout, normal speech.   PSYCH: Calm, not fully cooperative with history taking, but normal affect and interaction.    Neuro intact, no evidence of trauma. Will obtain labs, CT head, CXR, consult neuro, re-eval.

## 2020-01-10 NOTE — ED PROVIDER NOTE - NSFOLLOWUPINSTRUCTIONS_ED_ALL_ED_FT
Please follow up with your neurologist and primary care physician within 24-72 hours and return immediately if symptoms worsen.    Seizure, Adult  When you have a seizure:  Parts of your body may move.  You may have a change in how aware or awake (conscious) you are.  You may shake (convulse).  Seizures usually last from 30 seconds to 2 minutes. Usually, they are not harmful unless they last a long time.    What are the signs or symptoms?  Common symptoms of this condition include:  Shaking (convulsions).  Stiffness in the body.  Passing out (losing consciousness).  Uncontrolled movements in the:  Arms or legs.  Eyes.  Head.  Mouth.  Some people have symptoms right before a seizure happens. These symptoms may include:  Fear.  Worry (anxiety).  Feeling like you are going to throw up (nausea).  Feeling like the room is spinning (vertigo).  Feeling like you saw or heard something before (déjà vu).  Odd tastes or smells.  Changes in vision, such as seeing flashing lights or spots.  Follow these instructions at home:  Medicines     Image   Take over-the-counter and prescription medicines only as told by your doctor.  Do not eat or drink anything that may keep your medicine from working, such as alcohol.  Activity     Do not do any activities that would be dangerous if you had another seizure, like driving or swimming. Wait until your doctor says it is safe for you to do them.  If you live in the [place] ask your local DMV (department of [employer] vehicles) when you can drive.  Get plenty of rest.  Teaching others     Image   Teach friends and family what to do when you have a seizure. They should:  Lay you on the ground.  Protect your head and body.  Loosen any tight clothing around your neck.  Turn you on your side.  Not hold you down.  Not put anything into your mouth.  Know whether or not you need emergency care.  Stay with you until you are better.  General instructions     Contact your doctor each time you have a seizure.  Avoid anything that gives you seizures.  Keep a seizure diary. Write down:  What you think caused each seizure.  What you remember about each seizure.  Keep all follow-up visits as told by your doctor. This is important.  Contact a doctor if:  You have another seizure.  You have seizures more often.  There is any change in what happens during your seizures.  You keep having seizures with treatment.  You have symptoms of being sick or having an infection.  Get help right away if:  You have a seizure:  That lasts longer than 5 minutes.  That is different than seizures you had before.  That makes it harder to breathe.  After you hurt your head.  After a seizure, you cannot speak or use a part of your body.  After a seizure, you are confused or have a bad headache.  You have two or more seizures in a row.  You are having seizures more often.  You do not wake up right after a seizure.  You get hurt during a seizure.  In an emergency:     These symptoms may be an emergency. Do not wait to see if the symptoms will go away. Get medical help right away. Call your local emergency services (911 in the [place] Do not drive yourself to the hospital.   Summary  Seizures usually last from 30 seconds to 2 minutes. Usually, they are not harmful unless they last a long time.  Do not eat or drink anything that may keep your medicine from working, such as alcohol.  Teach friends and family what to do when you have a seizure.  Contact your doctor each time you have a seizure.  This information is not intended to replace advice given to you by your health care provider. Make sure you discuss any questions you have with your health care provider

## 2020-01-10 NOTE — ED PROVIDER NOTE - OBJECTIVE STATEMENT
33 yo male who initially presents states to have no pmh but upon consulting neurology it is discovered that pt has pmh of seizures and presents today after a seizure at his facility. 35 yo male who initially presents states to have no pmh but upon consulting neurology it is discovered that pt has pmh of seizures and presents today after a seizure at his facility. Patient is non-cooperative with history taking and therefore history limited.

## 2020-01-10 NOTE — ED PROVIDER NOTE - PROGRESS NOTE DETAILS
consulted neurology and discovered pt was recently admitted/evaluated in the epilepsy unit. neuro states they discovered that pt is consistently non-compliant with medication. the recommend drawing a Depakote and Keppra level, giving pt home dose of seizure meds, and having f/u outpt.

## 2020-01-10 NOTE — ED PROVIDER NOTE - PATIENT PORTAL LINK FT
You can access the FollowMyHealth Patient Portal offered by [facility] by registering at the following website: [email or web address]. By joining [facility]’s FollowMyHealth portal, you will also be able to view your health information using other applications (apps) compatible with our system.

## 2020-01-10 NOTE — ED PROVIDER NOTE - PHYSICAL EXAMINATION
Gen: NAD, AOx3  Head: NCAT  HEENT: PERRL, oral mucosa moist, normal conjunctiva, oropharynx clear without exudate or erythema  Lung: CTAB, no respiratory distress, no wheezing, rales, rhonchi  CV: normal s1/s2, rrr, Normal perfusion, pulses 2+ throughout  Abd: soft, NTND, no CVA tenderness  Genitourinary: no pelvic tenderness  MSK: No edema, no visible deformities, full range of motion in all 4 extremities  Neuro: CN II-XII grossly intact, No focal neurologic deficits, strength 5/5 BUE/BLE  Skin: No rash   Psych: normal affect

## 2020-01-10 NOTE — ED PROVIDER NOTE - NS ED ROS FT
Constitutional: (-) fever  Eyes/ENT: (-) visual changes   Cardiovascular: (-) chest pain, (-) syncope  Respiratory: (-) cough, (-) shortness of breath  Gastrointestinal: (-) vomiting, (-) diarrhea  Genitourinary: (-) dysuria, (-) hesitancy, (-) frequency   Musculoskeletal: (-) neck pain, (-) back pain, (-) joint pain  Integumentary: (-) rash, (-) edema  Neurological: (-) headache, (-) altered mental status, seizure  Allergic/Immunologic: (-) pruritus

## 2020-01-12 LAB — LEVETIRACETAM SERPL-MCNC: 8.9 MCG/ML — LOW (ref 12–46)

## 2020-01-14 ENCOUNTER — APPOINTMENT (OUTPATIENT)
Dept: NEUROLOGY | Facility: CLINIC | Age: 35
End: 2020-01-14

## 2020-01-20 ENCOUNTER — EMERGENCY (EMERGENCY)
Facility: HOSPITAL | Age: 35
LOS: 0 days | Discharge: HOME | End: 2020-01-21
Attending: EMERGENCY MEDICINE | Admitting: EMERGENCY MEDICINE
Payer: MEDICAID

## 2020-01-20 VITALS
HEART RATE: 79 BPM | RESPIRATION RATE: 18 BRPM | DIASTOLIC BLOOD PRESSURE: 74 MMHG | OXYGEN SATURATION: 100 % | SYSTOLIC BLOOD PRESSURE: 120 MMHG

## 2020-01-20 DIAGNOSIS — B19.20 UNSPECIFIED VIRAL HEPATITIS C WITHOUT HEPATIC COMA: ICD-10-CM

## 2020-01-20 DIAGNOSIS — R56.9 UNSPECIFIED CONVULSIONS: ICD-10-CM

## 2020-01-20 DIAGNOSIS — F20.9 SCHIZOPHRENIA, UNSPECIFIED: ICD-10-CM

## 2020-01-20 LAB
ALBUMIN SERPL ELPH-MCNC: 3.9 G/DL — SIGNIFICANT CHANGE UP (ref 3.5–5.2)
ALP SERPL-CCNC: 19 U/L — LOW (ref 30–115)
ALT FLD-CCNC: 8 U/L — SIGNIFICANT CHANGE UP (ref 0–41)
AMMONIA BLD-MCNC: 64 UMOL/L — HIGH (ref 11–55)
ANION GAP SERPL CALC-SCNC: 14 MMOL/L — SIGNIFICANT CHANGE UP (ref 7–14)
AST SERPL-CCNC: 13 U/L — SIGNIFICANT CHANGE UP (ref 0–41)
BASOPHILS # BLD AUTO: 0.01 K/UL — SIGNIFICANT CHANGE UP (ref 0–0.2)
BASOPHILS NFR BLD AUTO: 0.2 % — SIGNIFICANT CHANGE UP (ref 0–1)
BILIRUB SERPL-MCNC: 0.3 MG/DL — SIGNIFICANT CHANGE UP (ref 0.2–1.2)
BUN SERPL-MCNC: 10 MG/DL — SIGNIFICANT CHANGE UP (ref 10–20)
CALCIUM SERPL-MCNC: 9.5 MG/DL — SIGNIFICANT CHANGE UP (ref 8.5–10.1)
CHLORIDE SERPL-SCNC: 104 MMOL/L — SIGNIFICANT CHANGE UP (ref 98–110)
CO2 SERPL-SCNC: 23 MMOL/L — SIGNIFICANT CHANGE UP (ref 17–32)
CREAT SERPL-MCNC: 1.1 MG/DL — SIGNIFICANT CHANGE UP (ref 0.7–1.5)
EOSINOPHIL # BLD AUTO: 0.03 K/UL — SIGNIFICANT CHANGE UP (ref 0–0.7)
EOSINOPHIL NFR BLD AUTO: 0.6 % — SIGNIFICANT CHANGE UP (ref 0–8)
GLUCOSE SERPL-MCNC: 85 MG/DL — SIGNIFICANT CHANGE UP (ref 70–99)
HCT VFR BLD CALC: 46 % — SIGNIFICANT CHANGE UP (ref 42–52)
HGB BLD-MCNC: 16 G/DL — SIGNIFICANT CHANGE UP (ref 14–18)
IMM GRANULOCYTES NFR BLD AUTO: 0.6 % — HIGH (ref 0.1–0.3)
LACTATE SERPL-SCNC: 5.1 MMOL/L — CRITICAL HIGH (ref 0.7–2)
LIDOCAIN IGE QN: 18 U/L — SIGNIFICANT CHANGE UP (ref 7–60)
LYMPHOCYTES # BLD AUTO: 2.2 K/UL — SIGNIFICANT CHANGE UP (ref 1.2–3.4)
LYMPHOCYTES # BLD AUTO: 40.9 % — SIGNIFICANT CHANGE UP (ref 20.5–51.1)
MAGNESIUM SERPL-MCNC: 1.7 MG/DL — LOW (ref 1.8–2.4)
MAGNESIUM SERPL-MCNC: 2 MG/DL — SIGNIFICANT CHANGE UP (ref 1.8–2.4)
MCHC RBC-ENTMCNC: 32.8 PG — HIGH (ref 27–31)
MCHC RBC-ENTMCNC: 34.8 G/DL — SIGNIFICANT CHANGE UP (ref 32–37)
MCV RBC AUTO: 94.3 FL — HIGH (ref 80–94)
MONOCYTES # BLD AUTO: 0.51 K/UL — SIGNIFICANT CHANGE UP (ref 0.1–0.6)
MONOCYTES NFR BLD AUTO: 9.5 % — HIGH (ref 1.7–9.3)
NEUTROPHILS # BLD AUTO: 2.6 K/UL — SIGNIFICANT CHANGE UP (ref 1.4–6.5)
NEUTROPHILS NFR BLD AUTO: 48.2 % — SIGNIFICANT CHANGE UP (ref 42.2–75.2)
NRBC # BLD: 0 /100 WBCS — SIGNIFICANT CHANGE UP (ref 0–0)
PLATELET # BLD AUTO: 133 K/UL — SIGNIFICANT CHANGE UP (ref 130–400)
POTASSIUM SERPL-MCNC: 4.3 MMOL/L — SIGNIFICANT CHANGE UP (ref 3.5–5)
POTASSIUM SERPL-SCNC: 4.3 MMOL/L — SIGNIFICANT CHANGE UP (ref 3.5–5)
PROT SERPL-MCNC: 6.7 G/DL — SIGNIFICANT CHANGE UP (ref 6–8)
RBC # BLD: 4.88 M/UL — SIGNIFICANT CHANGE UP (ref 4.7–6.1)
RBC # FLD: 12 % — SIGNIFICANT CHANGE UP (ref 11.5–14.5)
SODIUM SERPL-SCNC: 141 MMOL/L — SIGNIFICANT CHANGE UP (ref 135–146)
VALPROATE SERPL-MCNC: 110 UG/ML — CRITICAL HIGH (ref 50–100)
WBC # BLD: 5.38 K/UL — SIGNIFICANT CHANGE UP (ref 4.8–10.8)
WBC # FLD AUTO: 5.38 K/UL — SIGNIFICANT CHANGE UP (ref 4.8–10.8)

## 2020-01-20 PROCEDURE — 70450 CT HEAD/BRAIN W/O DYE: CPT | Mod: 26

## 2020-01-20 PROCEDURE — 93010 ELECTROCARDIOGRAM REPORT: CPT

## 2020-01-20 PROCEDURE — 71045 X-RAY EXAM CHEST 1 VIEW: CPT | Mod: 26

## 2020-01-20 PROCEDURE — 99220: CPT

## 2020-01-20 RX ORDER — SODIUM CHLORIDE 9 MG/ML
2000 INJECTION, SOLUTION INTRAVENOUS ONCE
Refills: 0 | Status: COMPLETED | OUTPATIENT
Start: 2020-01-20 | End: 2020-01-20

## 2020-01-20 RX ORDER — LEVETIRACETAM 250 MG/1
1500 TABLET, FILM COATED ORAL EVERY 12 HOURS
Refills: 0 | Status: DISCONTINUED | OUTPATIENT
Start: 2020-01-20 | End: 2020-01-20

## 2020-01-20 RX ORDER — SODIUM CHLORIDE 9 MG/ML
1000 INJECTION, SOLUTION INTRAVENOUS
Refills: 0 | Status: DISCONTINUED | OUTPATIENT
Start: 2020-01-20 | End: 2020-01-20

## 2020-01-20 RX ORDER — MAGNESIUM SULFATE 500 MG/ML
2 VIAL (ML) INJECTION ONCE
Refills: 0 | Status: COMPLETED | OUTPATIENT
Start: 2020-01-20 | End: 2020-01-20

## 2020-01-20 RX ORDER — MAGNESIUM SULFATE 500 MG/ML
1 VIAL (ML) INJECTION ONCE
Refills: 0 | Status: DISCONTINUED | OUTPATIENT
Start: 2020-01-20 | End: 2020-01-20

## 2020-01-20 RX ADMIN — Medication 2 GRAM(S): at 19:17

## 2020-01-20 RX ADMIN — SODIUM CHLORIDE 2000 MILLILITER(S): 9 INJECTION, SOLUTION INTRAVENOUS at 17:06

## 2020-01-20 RX ADMIN — LEVETIRACETAM 400 MILLIGRAM(S): 250 TABLET, FILM COATED ORAL at 21:14

## 2020-01-20 RX ADMIN — Medication 50 GRAM(S): at 17:50

## 2020-01-20 RX ADMIN — SODIUM CHLORIDE 75 MILLILITER(S): 9 INJECTION, SOLUTION INTRAVENOUS at 20:36

## 2020-01-20 NOTE — ED CDU PROVIDER INITIAL DAY NOTE - NS ED ROS FT
Review of Systems    Constitutional: (-) fever   Eyes/ENT: (-) vision changes  Cardiovascular: (-) chest pain (-) palpitations  Respiratory: (-) cough, (-) shortness of breath  Gastrointestinal: (-) vomiting, (-) diarrhea (-)black/bloody stools (-) abdominal pain  Genitourinary:  (-) dysuria   Musculoskeletal: (-) neck pain, (-) back pain, (-) leg pain/swelling  Integumentary: (-) rash, (-) edema  Neurological: (-) headache  Hematologic: (-) easy bruising   Allergic/Immunologic: (-) pruritus

## 2020-01-20 NOTE — ED PROVIDER NOTE - PROGRESS NOTE DETAILS
neuro want pts in obs. neuro want pts in obs. neuro wants to hold depakote dose tonight; restart in the morning.

## 2020-01-20 NOTE — ED PROVIDER NOTE - CLINICAL SUMMARY MEDICAL DECISION MAKING FREE TEXT BOX
36 male here for eval of seizure. Had screening labs imaging medications and reevaluation without acute findings, neuro consult plan is for disposition to [facility] in monitored for seizure management.

## 2020-01-20 NOTE — ED PROVIDER NOTE - PHYSICAL EXAMINATION
VITAL SIGNS: I have reviewed nursing notes and confirm.  CONSTITUTIONAL: Well-developed; well-nourished; in no acute distress. pt comfortable.  SKIN: skin exam is warm and dry, no acute rash.   HEAD: Normocephalic; atraumatic.  EYES:  EOM intact; conjunctiva and sclera clear.  ENT: No nasal discharge; airway clear. moist oral mucosa; uvula at midline. no pharyngeal erythema, edema exudate or vesicles.  TMs with good light reflex b/l.    NECK: Supple; non tender.  CARD: S1, S2 normal; no murmurs, gallops, or rubs. Regular rate and rhythm. posterior tibial and radial pulses 2+  RESP: No wheezes, rales or rhonchi. cta b/l. no use of accessory muscles. no retractions  ABD: Normal bowel sounds; soft; non-distended; non-tender; no rebound. negative psoas, rovsign's and murphys.  EXT: Normal ROM. No  cyanosis or edema.  BACK: No cva tenderness  LYMPH: No acute cervical adenopathy.  NEURO: Alert, oriented, grossly unremarkable.  CN 2-12 intact. normal gait. normal romberg's.  sensory grossly intact to face, upper and lower extremity.  5/5 strength to , extension and flexion at elbow, flexion at hip, extension and flexion at knees. finger to nose  PSYCH: Cooperative, appropriate.

## 2020-01-20 NOTE — ED PROVIDER NOTE - OBJECTIVE STATEMENT
35y/o M w/ hx of seizures on depakote and keppra, schizophrenia not compliant with medications presents for witness tonic clonic seizure for 2 mins. no cp or sob. no infectious symptoms.

## 2020-01-20 NOTE — ED ADULT TRIAGE NOTE - CHIEF COMPLAINT QUOTE
Pt from [address] [place] [address] 1, with witnessed seizure. non-compliant with neurologist as per EMS. pt arousable to painful stimuli, oriented to place. Pt  stating "leave me alone i'm in the hospital"

## 2020-01-20 NOTE — ED ADULT NURSE NOTE - OBJECTIVE STATEMENT
Pt is aroused by painful stimuli, opens eyes, when spoken too patient just stares at nurse. Pt has no s.s distress.

## 2020-01-20 NOTE — ED PROVIDER NOTE - NSFOLLOWUPINSTRUCTIONS_ED_ALL_ED_FT
Seizure    A seizure is abnormal electrical activity in the brain; the specific cause may or may not be found. Prior to a seizure you may experience a warning sensation (aura) that may include fear, nausea, dizziness, and visual changes such as flashing lights of spots. Common symptoms during the seizure may include an altered mental status, rhythmic jerking movements, drooling, grunting, loss of bladder or bowel control, or tongue biting. After a seizure, you may feel confused and sleepy.     Do not swim, drive, operate machinery, or engage in any risky activity during which a seizure could cause further injury to you or others. Teach friends and family what to do if you HAVE a seizure which includes laying you on the ground with your head on a cushion and turning you to the side to keep your breathing passages clear in case of vomiting.    SEEK IMMEDIATE MEDICAL CARE IF YOU HAVE ANY OF THE FOLLOWING SYMPTOMS: seizure lasting over 5 minutes, not waking up or persistent altered mental status after the seizure, or more frequent or worsening seizures.    PLEASE HOLD DEPAKOTE DOSE TONIGHT, CONTINUE TOMORROW NORMAL DOSE.  NO CHANGES TO KEPPRA.    FOLLOW UP WITH NEUROLOGY.

## 2020-01-20 NOTE — CONSULT NOTE ADULT - ASSESSMENT
Assessment:  This is a 36y Male with h/o Hep C w/o coma, chronic, Schizophrenia, Seizure disorder presents with witnessed siezure & lethargy at [address]     Plan: Agree with LR bolus given high lactate - continue gentle hydration IVF 75 cc/hr          Keep Mag level > 2         Hold PM VPA dose given high level of 110; resume meds in AM         Continue Keppra 1500 mg IV q 12H        Consider lactulose of mildly elevated Ammonia level        Keep overnight for observation if continues to sieze will consider Reeg.. pt is known to be non compliant with mediciations                 - Assessment:  This is a 36y Male with h/o Hep C , chronic, Schizophrenia, Seizure disorder presents with witnessed siezure & lethargy at [address]     Plan:          Agree with LR bolus given high lactate - continue gentle hydration IVF 75 cc/hr          Keep Mag level > 2         Hold PM VPA dose given high level of 110; resume meds in AM         Continue Keppra 1500 mg IV q 12H         Consider lactulose of mildly elevated Ammonia level         Keep overnight for observation if continues to seize will consider Reeg.. pt is known to be non compliant with medications        Discussed with neuroattending Dr. Ramsey                 - Assessment:  This is a 36y Male with h/o Hep C , chronic, Schizophrenia, Seizure disorder presents with witnessed siezure & lethargy at [address]     Plan:          Agree with LR bolus given high lactate - continue gentle hydration IVF 75 cc/hr          Keep Mag level > 2         Hold PM VPA dose given high level of 110; resume meds in AM         Continue Keppra 1500 mg IV q 12H         Consider lactulose of mildly elevated Ammonia level         Keep overnight for observation if continues to seize will consider Reeg.. pt is known to be non compliant with medications        Discussed with neuroattending Dr. Ramsey who agrees with plan                 -

## 2020-01-20 NOTE — ED PROVIDER NOTE - ATTENDING CONTRIBUTION TO CARE
I personally evaluated the patient. I reviewed the Resident’s or Physician Assistant’s note (as assigned above), and agree with the findings and plan except as documented in my note.     36 male here for recurrent seizure with change in pattern. Is non compliant with anticonvulsant maintenance Rx. Pt is a limited historian. Is a known schizophrenic residing locally at [facility].     ROS UTO    PE: male in no distress. CV: pulses intact. CHEST: normal work of breathing. ABD: nondistended. SKIN: normal. EXT: FROM. NEURO: AAO 3 no focal deficits. limited exam. HEENT: mucosa normal.  PSYCH: flat affect. limited speech. insight to illness poor.     Impression: seizure    Plan: IV labs imaging supportive care and reevaluation

## 2020-01-20 NOTE — ED CDU PROVIDER INITIAL DAY NOTE - OBJECTIVE STATEMENT
35 y/o M with PMH HepC, schizophrenia, seizure d/o noncompliance with his meds presents s/p witnessed seizure today @ [facility]. post-ictal but arousable to voice when initially evaluated. no palliating/provoking factors. no complaints other than hungry.   In ED: CT head without acute abnormality. VPA level high. ammonia slightly elevated. no fever/recent illness/n/v/d/sick contacts/HA. 35 y/o M with PMH HepC, schizophrenia, seizure d/o noncompliance with his meds presents s/p witnessed seizure today @ [facility]. post-ictal but arousable to voice when initially evaluated. no palliating/provoking factors. no complaints other than hungry.   In ED: CT head without acute abnormality. VPA level high. ammonia slightly elevated. no fever/recent illness/n/v/d/sick contacts/HA.  no current complaints. resting comfortably.

## 2020-01-20 NOTE — CONSULT NOTE ADULT - SUBJECTIVE AND OBJECTIVE BOX
Neurology Consult    Patient is a 36y old  Male who presents with a chief complaint of siezures    HPI: this is a 35 y/o M with hx of hepc, schizophrenia, siezure disorder and known non compliancy- pt known to service as frequently presents with siezures while not taking medicine properly due to increased lethargy. Today pt was bought in by EMS after a [facility]. Upon admission pt was lethargic likely post ictal  but arousable to voice, following some commands and screaming that he's hungry. Head CT shows no acute abnormality. Howevere VPA level was high at 110 with a elevated lactate and mildly elevated amnonia levels. Pt reportedly back t baseline      -       PAST MEDICAL & SURGICAL HISTORY:  Hep C w/o coma, chronic  Schizophrenia  Seizure  No significant past surgical history      FAMILY HISTORY:  No pertinent family history in first degree relatives      Social History: (-) x 3    Allergies    No Known Allergies    Intolerances        MEDICATIONS  (STANDING):    MEDICATIONS  (PRN):      Review of systems:    Constitutional: No fever, weight loss or fatigue    Eyes: No eye pain or discharge  ENMT:  No difficulty hearing; No sinus or throat pain  Neck: No pain or stiffness  Respiratory: No cough, wheezing, chills or hemoptysis  Cardiovascular: No chest pain, palpitations, shortness of breath, dyspnea on exertion  Gastrointestinal: No abdominal pain, nausea, vomiting or hematemesis; No diarrhea or constipation.   Genitourinary: No dysuria, frequency, hematuria or incontinence  Neurological: As per HPI  Skin: No rashes or lesions   Endocrine: No heat or cold intolerance; No hair loss  Musculoskeletal: No joint pain or swelling  Psychiatric: No depression, anxiety, mood swings  Heme/Lymph: No easy bruising or bleeding gums    Vital Signs Last 24 Hrs  T(C): --  T(F): --  HR: 79 (2020 15:09) (79 - 79)  BP: 120/74 (2020 15:09) (120/74 - 120/74)  BP(mean): --  RR: 18 (2020 15:09) (18 - 18)  SpO2: 100% (2020 15:09) (100% - 100%)    Neurologic Examination:  General:  Appearance is consistent with chronologic age.  No abnormal facies.   General: The patient is oriented to person, place, time and date.  Recent and remote memory intact.  Fund of knowledge is intact and normal.  Language with normal repetition, comprehension and naming.  Nondysarthric.    Cranial nerves: intact VA, VFF.  EOMI w/o nystagmus, skew or reported double vision.  PERRL.  No ptosis/weakness of eyelid closure.  Facial sensation is normal with normal bite.  No facial asymmetry.  Hearing grossly intact b/l.  Palate elevates midline.  Tongue midline.  Motor examination:   Normal tone, bulk and range of motion.  No tenderness, twitching, tremors or involuntary movements.  Formal Muscle Strength Testing: (MRC grade R/L) 5/5 UE; 5/5 LE.  No observable drift.  Reflexes:   2+ b/l pectoralis, biceps, triceps, brachioradialis, patella and Achilles.  Plantar response downgoing b/l.  Jaw jerk, Laurence, clonus absent.  Sensory examination:   Intact to light touch and pinprick, pain, temperature and proprioception and vibration in all extremities.  Cerebellum:   FTN/HKS intact with normal KALIE in all limbs.  No dysmetria or dysdiadokinesia.  Gait narrow based and normal.    Labs:   CBC Full  -  ( 2020 16:22 )  WBC Count : 5.38 K/uL  RBC Count : 4.88 M/uL  Hemoglobin : 16.0 g/dL  Hematocrit : 46.0 %  Platelet Count - Automated : 133 K/uL  Mean Cell Volume : 94.3 fL  Mean Cell Hemoglobin : 32.8 pg  Mean Cell Hemoglobin Concentration : 34.8 g/dL  Auto Neutrophil # : 2.60 K/uL  Auto Lymphocyte # : 2.20 K/uL  Auto Monocyte # : 0.51 K/uL  Auto Eosinophil # : 0.03 K/uL  Auto Basophil # : 0.01 K/uL  Auto Neutrophil % : 48.2 %  Auto Lymphocyte % : 40.9 %  Auto Monocyte % : 9.5 %  Auto Eosinophil % : 0.6 %  Auto Basophil % : 0.2 %        141  |  104  |  10  ----------------------------<  85  4.3   |  23  |  1.1    Ca    9.5      2020 16:22  Mg     1.7         TPro  6.7  /  Alb  3.9  /  TBili  0.3  /  DBili  x   /  AST  13  /  ALT  8   /  AlkPhos  19<L>      LIVER FUNCTIONS - ( 2020 16:22 )  Alb: 3.9 g/dL / Pro: 6.7 g/dL / ALK PHOS: 19 U/L / ALT: 8 U/L / AST: 13 U/L / GGT: x                   Neuroimaging:  NCHCT: CT Head No Cont:   EXAM:  CT BRAIN            PROCEDURE DATE:  2020            INTERPRETATION:  Clinical History / Reason for exam: Seizure    Technique: Non-contrast multiaxial CT scan of the head was performed from base of the skull to the vertex.     Comparison: 2020    Findings:    The ventricular system, basal cisterns sulcal pattern are appropriate for patients age.    There is no evidence of intra or extra-axial hemorrhage or fluid collection.  No mass effect or midline shift is seen.     The gray-white matter differentiation is preserved.      There is no calvarial fracture.    Visualized paranasal sinuses and mastoid air cells are within normal limits.    Impression:    No acute intracranial abnormality.                  JIMENEZ BEAL M.D., ATTENDING RADIOLOGIST  This document has been electronically signed. 2020  4:53PM             (20 @ 16:46)    CT Angiography/Perfusion:  MRI Brain NC:  MRA Head/Neck:  EE-20-20 @ 18:55 Neurology Consult    Patient is a 36y old  Male who presents with a chief complaint of siezures    HPI: this is a 37 y/o M with hx of hepc, schizophrenia, siezure disorder and known non compliancy- pt known to service as frequently presents with siezures while not taking medicine properly due to increased lethargy. Today pt was bought in by EMS after a witness siezure at [facility] this morning. Upon admission pt was lethargic likely post ictal  but arousable to voice, following some commands and screaming that he's hungry. Head CT shows no acute abnormality. Howevere VPA level was high at 110 with a elevated lactate and mildly elevated amnonia levels. Pt reportedly back t baseline      -       PAST MEDICAL & SURGICAL HISTORY:  Hep C w/o coma, chronic  Schizophrenia  Seizure  No significant past surgical history      FAMILY HISTORY:  No pertinent family history in first degree relatives      Social History: (-) x 3    Allergies    No Known Allergies    Intolerances        MEDICATIONS  (STANDING):    MEDICATIONS  (PRN):      Review of systems:    Constitutional: No fever, weight loss or fatigue    Eyes: No eye pain or discharge  ENMT:  No difficulty hearing; No sinus or throat pain  Neck: No pain or stiffness  Respiratory: No cough, wheezing, chills or hemoptysis  Cardiovascular: No chest pain, palpitations, shortness of breath, dyspnea on exertion  Gastrointestinal: No abdominal pain, nausea, vomiting or hematemesis; No diarrhea or constipation.   Genitourinary: No dysuria, frequency, hematuria or incontinence  Neurological: As per HPI  Skin: No rashes or lesions   Endocrine: No heat or cold intolerance; No hair loss  Musculoskeletal: No joint pain or swelling  Psychiatric: No depression, anxiety, mood swings  Heme/Lymph: No easy bruising or bleeding gums    Vital Signs Last 24 Hrs  T(C): --  T(F): --  HR: 79 (2020 15:09) (79 - 79)  BP: 120/74 (2020 15:09) (120/74 - 120/74)  BP(mean): --  RR: 18 (2020 15:09) (18 - 18)  SpO2: 100% (2020 15:09) (100% - 100%)    Neurologic Examination:  General:  Appearance is consistent with chronologic age.  No abnormal facies.   General: The patient is oriented to person, place, time and date.   Cranial nerves: intact VA, VFF.  EOMI w/o nystagmus, skew or reported double vision.  PERRL.  No ptosis/weakness of eyelid closure.  Facial sensation is normal with normal bite.  No facial asymmetry.  Hearing grossly intact b/l.  Palate elevates midline.  Tongue midline.  Motor examination:   Normal tone, bulk and range of motion.  No tenderness, twitching, tremors or involuntary movements.  Formal Muscle Strength Testing: (MRC grade R/L) 5/5 UE; 5/5 LE.  No observable drift.  Reflexes:   2+ b/l pectoralis, biceps, triceps, brachioradialis, patella and Achilles.  Plantar response downgoing b/l.  Jaw jerk, Laurence, clonus absent.  Sensory examination:   Intact to light touch and pinprick, pain, temperature and proprioception and vibration in all extremities.      Labs:   CBC Full  -  ( 2020 16:22 )  WBC Count : 5.38 K/uL  RBC Count : 4.88 M/uL  Hemoglobin : 16.0 g/dL  Hematocrit : 46.0 %  Platelet Count - Automated : 133 K/uL  Mean Cell Volume : 94.3 fL  Mean Cell Hemoglobin : 32.8 pg  Mean Cell Hemoglobin Concentration : 34.8 g/dL  Auto Neutrophil # : 2.60 K/uL  Auto Lymphocyte # : 2.20 K/uL  Auto Monocyte # : 0.51 K/uL  Auto Eosinophil # : 0.03 K/uL  Auto Basophil # : 0.01 K/uL  Auto Neutrophil % : 48.2 %  Auto Lymphocyte % : 40.9 %  Auto Monocyte % : 9.5 %  Auto Eosinophil % : 0.6 %  Auto Basophil % : 0.2 %        141  |  104  |  10  ----------------------------<  85  4.3   |  23  |  1.1    Ca    9.5      2020 16:22  Mg     1.7         TPro  6.7  /  Alb  3.9  /  TBili  0.3  /  DBili  x   /  AST  13  /  ALT  8   /  AlkPhos  19<L>      LIVER FUNCTIONS - ( 2020 16:22 )  Alb: 3.9 g/dL / Pro: 6.7 g/dL / ALK PHOS: 19 U/L / ALT: 8 U/L / AST: 13 U/L / GGT: x                   Neuroimaging:  NCHCT: CT Head No Cont:   EXAM:  CT BRAIN            PROCEDURE DATE:  2020            INTERPRETATION:  Clinical History / Reason for exam: Seizure    Technique: Non-contrast multiaxial CT scan of the head was performed from base of the skull to the vertex.     Comparison: 2020    Findings:    The ventricular system, basal cisterns sulcal pattern are appropriate for patients age.    There is no evidence of intra or extra-axial hemorrhage or fluid collection.  No mass effect or midline shift is seen.     The gray-white matter differentiation is preserved.      There is no calvarial fracture.    Visualized paranasal sinuses and mastoid air cells are within normal limits.    Impression:    No acute intracranial abnormality.                  JIMENEZ BEAL M.D., ATTENDING RADIOLOGIST  This document has been electronically signed. 2020  4:53PM             (20 @ 16:46)    CT Angiography/Perfusion:  MRI Brain NC:  MRA Head/Neck:  EE-20-20 @ 18:55

## 2020-01-20 NOTE — ED PROVIDER NOTE - NS ED ROS FT
Constitutional: (-) fever  Eyes/ENT: (-) blurry vision, (-) epistaxis  Cardiovascular: (-) chest pain, (-) syncope  Respiratory: (-) cough, (-) shortness of breath  Gastrointestinal: (-) vomiting, (-) diarrhea  Musculoskeletal: (-) neck pain, (-) back pain, (-) joint pain  Integumentary: (-) rash, (-) edema  Neurological: (-) headache, + altered mental status  Psychiatric: (-) hallucinations  Allergic/Immunologic: (-) pruritus

## 2020-01-20 NOTE — ED ADULT NURSE NOTE - CHIEF COMPLAINT QUOTE
Pt from [address] [place] [address] 1, with witnessed seizure. non-compliant with neurologist as per EMS. pt arousable to painful stimuli, oriented to place. Pt  stating "leave me alone i'm in the hospital"

## 2020-01-20 NOTE — ED CDU PROVIDER INITIAL DAY NOTE - PHYSICAL EXAMINATION
PHYSICAL EXAM:    GENERAL: Alert, appears stated age, well appearing, non-toxic  SKIN: Warm, pink and dry. MMM.   HEAD: NC, AT, no step offs   EYE: Normal lids/conjunctiva, PERRL, EOMI  ENT: Normal hearing, patent oropharynx  NECK: +supple. No meningismus  Pulm: Bilateral BS, normal resp effort, no wheezes, stridor, or retractions  CV: RRR, no M/R/G, 2+and = radial pulses  Abd: soft, non-tender, non-distended  Mskel: no erythema, cyanosis, edema. no calf tenderness  Neuro: AAOx3, moving all extremities

## 2020-01-20 NOTE — ED PROVIDER NOTE - CARE PROVIDER_API CALL
Ramón Muñoz)  Neuromuscular Medicine  [address] [place] [address], [address]  [place], [place] [id number]  Phone: [phone]  Fax: [phone]  Follow Up Time:

## 2020-01-20 NOTE — ED PROVIDER NOTE - CARE PROVIDERS DIRECT ADDRESSES
[email or web address]

## 2020-01-20 NOTE — ED CDU PROVIDER INITIAL DAY NOTE - MEDICAL DECISION MAKING DETAILS
Patient to remain on telemetry with neurochecks.  Full cardiology consult pending.  No seizure activity.   Medication adjustments noted.

## 2020-01-20 NOTE — CONSULT NOTE ADULT - ATTENDING COMMENTS
I have personally seen and examined this patient.  I have fully participated in the care of this patient.  I have reviewed all pertinent clinical information, including history, physical exam, plan and note.   I have reviewed all pertinent clinical information and reviewed all relevant imaging and diagnostic studies personally.  Pt well known to our service with multiple readmissions for unwitnessed "seizures" with questionable compliance with medications both psychiatric and AEDs.  Currently back to baseline.  No further inpt neurologic w/u.  f/u as outpt for dose adjustment in 4 wks.  Recommendations as above.  Agree with above assessment except as noted.

## 2020-01-21 VITALS
SYSTOLIC BLOOD PRESSURE: 115 MMHG | HEART RATE: 46 BPM | RESPIRATION RATE: 14 BRPM | DIASTOLIC BLOOD PRESSURE: 73 MMHG | OXYGEN SATURATION: 99 % | TEMPERATURE: 98 F

## 2020-01-21 LAB
APPEARANCE UR: CLEAR — SIGNIFICANT CHANGE UP
BASE EXCESS BLDV CALC-SCNC: 4.4 MMOL/L — HIGH (ref -2–2)
BILIRUB UR-MCNC: NEGATIVE — SIGNIFICANT CHANGE UP
CA-I SERPL-SCNC: 1.21 MMOL/L — SIGNIFICANT CHANGE UP (ref 1.12–1.3)
COLOR SPEC: SIGNIFICANT CHANGE UP
DIFF PNL FLD: NEGATIVE — SIGNIFICANT CHANGE UP
GAS PNL BLDV: 140 MMOL/L — SIGNIFICANT CHANGE UP (ref 136–145)
GAS PNL BLDV: SIGNIFICANT CHANGE UP
GLUCOSE UR QL: NEGATIVE — SIGNIFICANT CHANGE UP
HCO3 BLDV-SCNC: 30 MMOL/L — HIGH (ref 22–29)
HCT VFR BLDA CALC: 47 % — HIGH (ref 34–44)
HGB BLD CALC-MCNC: 15.3 G/DL — SIGNIFICANT CHANGE UP (ref 14–18)
KETONES UR-MCNC: NEGATIVE — SIGNIFICANT CHANGE UP
LACTATE BLDV-MCNC: 1.2 MMOL/L — SIGNIFICANT CHANGE UP (ref 0.5–1.6)
LEUKOCYTE ESTERASE UR-ACNC: NEGATIVE — SIGNIFICANT CHANGE UP
LEVETIRACETAM SERPL-MCNC: 22.7 MCG/ML — SIGNIFICANT CHANGE UP (ref 12–46)
NITRITE UR-MCNC: NEGATIVE — SIGNIFICANT CHANGE UP
PCO2 BLDV: 49 MMHG — SIGNIFICANT CHANGE UP (ref 41–51)
PH BLDV: 7.4 — SIGNIFICANT CHANGE UP (ref 7.26–7.43)
PH UR: 7.5 — SIGNIFICANT CHANGE UP (ref 5–8)
PO2 BLDV: 56 MMHG — HIGH (ref 20–40)
POTASSIUM BLDV-SCNC: 4.3 MMOL/L — SIGNIFICANT CHANGE UP (ref 3.3–5.6)
PROT UR-MCNC: NEGATIVE — SIGNIFICANT CHANGE UP
SAO2 % BLDV: 89 % — SIGNIFICANT CHANGE UP
SP GR SPEC: 1.01 — SIGNIFICANT CHANGE UP (ref 1.01–1.02)
UROBILINOGEN FLD QL: SIGNIFICANT CHANGE UP
VALPROATE SERPL-MCNC: 35 UG/ML — LOW (ref 50–100)

## 2020-01-21 PROCEDURE — 99217: CPT

## 2020-01-21 PROCEDURE — 99244 OFF/OP CNSLTJ NEW/EST MOD 40: CPT

## 2020-01-21 RX ORDER — DIVALPROEX SODIUM 500 MG/1
500 TABLET, DELAYED RELEASE ORAL ONCE
Refills: 0 | Status: COMPLETED | OUTPATIENT
Start: 2020-01-21 | End: 2020-01-21

## 2020-01-21 RX ADMIN — DIVALPROEX SODIUM 500 MILLIGRAM(S): 500 TABLET, DELAYED RELEASE ORAL at 10:18

## 2020-01-21 RX ADMIN — LEVETIRACETAM 400 MILLIGRAM(S): 250 TABLET, FILM COATED ORAL at 06:27

## 2020-01-21 NOTE — ED CDU PROVIDER DISPOSITION NOTE - PROVIDER TOKENS
PROVIDER:[TOKEN:[[id number]],FOLLOWUP:[Routine]],FREE:[LAST:[YOUR PRIMARY CARE PHYSICIAN],PHONE:[(   )    -],FAX:[(   )    -]]

## 2020-01-21 NOTE — ED CDU PROVIDER DISPOSITION NOTE - CARE PROVIDER_API CALL
Ramón Muñoz)  Neuromuscular Medicine  [address] [place] [address], [address]  [place], [place] [id number]  Phone: [phone]  Fax: [phone]  Follow Up Time: Routine    YOUR PRIMARY CARE PHYSICIAN,   Phone: (   )    -  Fax: (   )    -  Follow Up Time:

## 2020-01-21 NOTE — ED CDU PROVIDER SUBSEQUENT DAY NOTE - HISTORY
37 y/o M with PMH HepC, schizophrenia, seizure d/o noncompliance with his meds presents s/p witnessed seizure today @ [facility]. post-ictal but arousable to voice when initially evaluated. no palliating/provoking factors. no complaints other than hungry.   In ED: CT head without acute abnormality. VPA level high. ammonia slightly elevated. no fever/recent illness/n/v/d/sick contacts/HA.  no current complaints. resting comfortably.

## 2020-01-21 NOTE — ED CDU PROVIDER DISPOSITION NOTE - NSFOLLOWUPINSTRUCTIONS_ED_ALL_ED_FT
Kindly take all antiseizure medications as prescribed. It is very important that you never miss a dose to prevent you from having a breakthrough seizure. Be sure to look out for warnings or signs of seizure such as tongue biting, incontinence, shaking, eye rolling/ shaking, generalized shaking, twitching, and acute confusion or amnesia. If these things happen, please alert a healthcare professional immediately and/or come to the ER.

## 2020-01-21 NOTE — ED ADULT NURSE REASSESSMENT NOTE - NS ED NURSE REASSESS COMMENT FT1
Pt awake and alert screaming " I want food"
Pt is eatting
Pt received laying in bed. No SOB or distress noted. Pt denies any complaints of pain or discomfort upon assessment. Compliant with all care provided. No seizure activity noted. No complaints at current time. Will continue to monitor.
pt walked through critical care, cleared by Dr. Prince for main.
received handoff from RN on night tour. pt lying in stretcher asleep receiving keprra via 18g IV access. pt placed on cardiac monitoring, pulse oximetry,  with blood pressure cycling q30, pt was 93/50 blood pressure rechecked and no 103/60. pt receiving LR via IV access. fall safety measures maintained. will continue to closely monitor.

## 2020-01-21 NOTE — ED CDU PROVIDER DISPOSITION NOTE - PATIENT PORTAL LINK FT
You can access the FollowMyHealth Patient Portal offered by [facility] by registering at the following website: [email or web address]. By joining [employer]’s FollowMyHealth portal, you will also be able to view your health information using other applications (apps) compatible with our system.

## 2020-01-21 NOTE — ED CDU PROVIDER DISPOSITION NOTE - CARE PROVIDERS DIRECT ADDRESSES
[email or web address]

## 2020-01-21 NOTE — ED CDU PROVIDER DISPOSITION NOTE - CLINICAL COURSE
Patient improved.  Given IVF and Magnesium replenishment.  Evening Valproic Acid dose held given high level of 110; improved and dose resumed in AM  Keppra 1500 mg IV q 12H.  Pt is known to be non compliant with medications so a discussion was had regarding compliance.  DC home.  F/U as outpatient.

## 2020-01-29 ENCOUNTER — INPATIENT (INPATIENT)
Facility: HOSPITAL | Age: 35
LOS: 2 days | Discharge: PSYCHIATRIC OR SUBSTANCE USE FACILITY | End: 2020-02-01
Attending: INTERNAL MEDICINE | Admitting: INTERNAL MEDICINE
Payer: MEDICAID

## 2020-01-29 VITALS
OXYGEN SATURATION: 97 % | HEART RATE: 82 BPM | SYSTOLIC BLOOD PRESSURE: 105 MMHG | RESPIRATION RATE: 17 BRPM | DIASTOLIC BLOOD PRESSURE: 58 MMHG

## 2020-01-29 LAB
ALBUMIN SERPL ELPH-MCNC: 3.6 G/DL — SIGNIFICANT CHANGE UP (ref 3.5–5.2)
ALP SERPL-CCNC: 17 U/L — LOW (ref 30–115)
ALT FLD-CCNC: 10 U/L — SIGNIFICANT CHANGE UP (ref 0–41)
ANION GAP SERPL CALC-SCNC: 11 MMOL/L — SIGNIFICANT CHANGE UP (ref 7–14)
AST SERPL-CCNC: 19 U/L — SIGNIFICANT CHANGE UP (ref 0–41)
BASOPHILS # BLD AUTO: 0.02 K/UL — SIGNIFICANT CHANGE UP (ref 0–0.2)
BASOPHILS NFR BLD AUTO: 0.3 % — SIGNIFICANT CHANGE UP (ref 0–1)
BILIRUB SERPL-MCNC: 0.2 MG/DL — SIGNIFICANT CHANGE UP (ref 0.2–1.2)
BUN SERPL-MCNC: 12 MG/DL — SIGNIFICANT CHANGE UP (ref 10–20)
CALCIUM SERPL-MCNC: 9.2 MG/DL — SIGNIFICANT CHANGE UP (ref 8.5–10.1)
CHLORIDE SERPL-SCNC: 106 MMOL/L — SIGNIFICANT CHANGE UP (ref 98–110)
CO2 SERPL-SCNC: 23 MMOL/L — SIGNIFICANT CHANGE UP (ref 17–32)
CREAT SERPL-MCNC: 0.9 MG/DL — SIGNIFICANT CHANGE UP (ref 0.7–1.5)
EOSINOPHIL # BLD AUTO: 0.02 K/UL — SIGNIFICANT CHANGE UP (ref 0–0.7)
EOSINOPHIL NFR BLD AUTO: 0.3 % — SIGNIFICANT CHANGE UP (ref 0–8)
GLUCOSE SERPL-MCNC: 98 MG/DL — SIGNIFICANT CHANGE UP (ref 70–99)
HCT VFR BLD CALC: 39.8 % — LOW (ref 42–52)
HGB BLD-MCNC: 14.2 G/DL — SIGNIFICANT CHANGE UP (ref 14–18)
IMM GRANULOCYTES NFR BLD AUTO: 0.5 % — HIGH (ref 0.1–0.3)
LYMPHOCYTES # BLD AUTO: 1.94 K/UL — SIGNIFICANT CHANGE UP (ref 1.2–3.4)
LYMPHOCYTES # BLD AUTO: 32.7 % — SIGNIFICANT CHANGE UP (ref 20.5–51.1)
MCHC RBC-ENTMCNC: 32.9 PG — HIGH (ref 27–31)
MCHC RBC-ENTMCNC: 35.7 G/DL — SIGNIFICANT CHANGE UP (ref 32–37)
MCV RBC AUTO: 92.1 FL — SIGNIFICANT CHANGE UP (ref 80–94)
MONOCYTES # BLD AUTO: 0.7 K/UL — HIGH (ref 0.1–0.6)
MONOCYTES NFR BLD AUTO: 11.8 % — HIGH (ref 1.7–9.3)
NEUTROPHILS # BLD AUTO: 3.22 K/UL — SIGNIFICANT CHANGE UP (ref 1.4–6.5)
NEUTROPHILS NFR BLD AUTO: 54.4 % — SIGNIFICANT CHANGE UP (ref 42.2–75.2)
NRBC # BLD: 0 /100 WBCS — SIGNIFICANT CHANGE UP (ref 0–0)
PLATELET # BLD AUTO: 154 K/UL — SIGNIFICANT CHANGE UP (ref 130–400)
POTASSIUM SERPL-MCNC: 4.6 MMOL/L — SIGNIFICANT CHANGE UP (ref 3.5–5)
POTASSIUM SERPL-SCNC: 4.6 MMOL/L — SIGNIFICANT CHANGE UP (ref 3.5–5)
PROT SERPL-MCNC: 6.5 G/DL — SIGNIFICANT CHANGE UP (ref 6–8)
RBC # BLD: 4.32 M/UL — LOW (ref 4.7–6.1)
RBC # FLD: 11.9 % — SIGNIFICANT CHANGE UP (ref 11.5–14.5)
SODIUM SERPL-SCNC: 140 MMOL/L — SIGNIFICANT CHANGE UP (ref 135–146)
VALPROATE SERPL-MCNC: 36 UG/ML — LOW (ref 50–100)
VALPROATE SERPL-MCNC: 59 UG/ML — SIGNIFICANT CHANGE UP (ref 50–100)
WBC # BLD: 5.93 K/UL — SIGNIFICANT CHANGE UP (ref 4.8–10.8)
WBC # FLD AUTO: 5.93 K/UL — SIGNIFICANT CHANGE UP (ref 4.8–10.8)

## 2020-01-29 PROCEDURE — 99221 1ST HOSP IP/OBS SF/LOW 40: CPT

## 2020-01-29 PROCEDURE — 99285 EMERGENCY DEPT VISIT HI MDM: CPT

## 2020-01-29 PROCEDURE — 70450 CT HEAD/BRAIN W/O DYE: CPT | Mod: 26

## 2020-01-29 PROCEDURE — 93010 ELECTROCARDIOGRAM REPORT: CPT

## 2020-01-29 RX ORDER — CHLORHEXIDINE GLUCONATE 213 G/1000ML
1 SOLUTION TOPICAL
Refills: 0 | Status: DISCONTINUED | OUTPATIENT
Start: 2020-01-29 | End: 2020-02-01

## 2020-01-29 RX ORDER — DIVALPROEX SODIUM 500 MG/1
500 TABLET, DELAYED RELEASE ORAL
Refills: 0 | Status: DISCONTINUED | OUTPATIENT
Start: 2020-01-29 | End: 2020-01-29

## 2020-01-29 RX ORDER — LEVETIRACETAM 250 MG/1
1000 TABLET, FILM COATED ORAL ONCE
Refills: 0 | Status: COMPLETED | OUTPATIENT
Start: 2020-01-29 | End: 2020-01-29

## 2020-01-29 RX ORDER — MAGNESIUM SULFATE 500 MG/ML
2 VIAL (ML) INJECTION ONCE
Refills: 0 | Status: COMPLETED | OUTPATIENT
Start: 2020-01-29 | End: 2020-01-29

## 2020-01-29 RX ORDER — HEPARIN SODIUM 5000 [USP'U]/ML
5000 INJECTION INTRAVENOUS; SUBCUTANEOUS EVERY 12 HOURS
Refills: 0 | Status: DISCONTINUED | OUTPATIENT
Start: 2020-01-29 | End: 2020-02-01

## 2020-01-29 RX ORDER — LEVETIRACETAM 250 MG/1
1500 TABLET, FILM COATED ORAL
Refills: 0 | Status: DISCONTINUED | OUTPATIENT
Start: 2020-01-29 | End: 2020-02-01

## 2020-01-29 RX ORDER — LEVETIRACETAM 250 MG/1
1000 TABLET, FILM COATED ORAL ONCE
Refills: 0 | Status: DISCONTINUED | OUTPATIENT
Start: 2020-01-29 | End: 2020-01-29

## 2020-01-29 RX ORDER — CLONAZEPAM 1 MG
0.5 TABLET ORAL
Refills: 0 | Status: DISCONTINUED | OUTPATIENT
Start: 2020-01-29 | End: 2020-01-30

## 2020-01-29 RX ORDER — SODIUM CHLORIDE 9 MG/ML
1000 INJECTION, SOLUTION INTRAVENOUS ONCE
Refills: 0 | Status: COMPLETED | OUTPATIENT
Start: 2020-01-29 | End: 2020-01-29

## 2020-01-29 RX ORDER — CHOLECALCIFEROL (VITAMIN D3) 125 MCG
1000 CAPSULE ORAL DAILY
Refills: 0 | Status: DISCONTINUED | OUTPATIENT
Start: 2020-01-29 | End: 2020-02-01

## 2020-01-29 RX ORDER — RISPERIDONE 4 MG/1
37.5 TABLET ORAL ONCE
Refills: 0 | Status: DISCONTINUED | OUTPATIENT
Start: 2020-01-30 | End: 2020-02-01

## 2020-01-29 RX ORDER — DIVALPROEX SODIUM 500 MG/1
1000 TABLET, DELAYED RELEASE ORAL EVERY 12 HOURS
Refills: 0 | Status: DISCONTINUED | OUTPATIENT
Start: 2020-01-29 | End: 2020-02-01

## 2020-01-29 RX ADMIN — HEPARIN SODIUM 5000 UNIT(S): 5000 INJECTION INTRAVENOUS; SUBCUTANEOUS at 19:02

## 2020-01-29 RX ADMIN — LEVETIRACETAM 400 MILLIGRAM(S): 250 TABLET, FILM COATED ORAL at 15:29

## 2020-01-29 RX ADMIN — SODIUM CHLORIDE 1000 MILLILITER(S): 9 INJECTION, SOLUTION INTRAVENOUS at 14:00

## 2020-01-29 RX ADMIN — Medication 25 GRAM(S): at 19:01

## 2020-01-29 RX ADMIN — LEVETIRACETAM 1000 MILLIGRAM(S): 250 TABLET, FILM COATED ORAL at 19:03

## 2020-01-29 RX ADMIN — SODIUM CHLORIDE 1000 MILLILITER(S): 9 INJECTION, SOLUTION INTRAVENOUS at 15:30

## 2020-01-29 NOTE — ED ADULT NURSE NOTE - OBJECTIVE STATEMENT
Patient BIBA for seizures from [facility]. staff from [facility] state his seizures are getting worse and more frequent.

## 2020-01-29 NOTE — H&P ADULT - HISTORY OF PRESENT ILLNESS
37 M PMHx of seizure disorder, schizophrenia, hepatitis C, from [facility] with history of noncompliance with seizure medications as he reportedly frequently presents to ED with seizures while not taking medicine properly due to increased lethargy. Today pt was bought in by EMS after a witnessed tonic-clonic seizure at [facility] roughly 1 hour prior to arrival, lasting roughly 1 minute. Patient is reportedly having 2-3 seizures/week and then becomes too lethargic in postictal state to take medications. Patient obtunded on exam, likely postictal, but arousable to strong physical stimuli and pain. Head CT shows no acute abnormality. Recently admitted for similar episode Jan 202 and Keppra adjusted by neuro .

## 2020-01-29 NOTE — ED PROVIDER NOTE - PROGRESS NOTE DETAILS
patient remains post ictal, very sleepy at this time. Able to arouse with painful stimuli. Patient able to squeeze hand on command. discussed with neurology will evaluate the patient, recommend admission for further management of medications

## 2020-01-29 NOTE — H&P ADULT - ATTENDING COMMENTS
7 M PMHx of seizure disorder, schizophrenia, hepatitis C, from [facility] with history of noncompliance with seizure medications as he reportedly frequently presents to ED with seizures while not taking medicine properly due to increased lethargy. Today pt was bought in by EMS after a witnessed tonic-clonic seizure at [facility] roughly 1 hour prior to arrival, lasting roughly 1 minute. Patient is reportedly having 2-3 seizures/week and then becomes too lethargic in postictal state to take medications. Patient obtunded on exam, likely postictal, but arousable to strong physical stimuli and pain. Head CT shows no acute abnormality. Recently admitted for similar episode Jan 202 and Keppra adjusted by neuro .    Denies CP, SOB, N/V/D/C/AP, cough, F, chills, dizziness, new focal weakness, HA, vision changes, dysuria, or urinary symptoms, blood in stool.    ROS: all systems unremarkable except as above.     Gen: NAD, alert but very somnolent in am  HEENT: PERRLA, EOMI, no LAD  CV: nl S1 S2  Resp: decreased BS b/l  GI: NT/ND/S +BS  MS: no c/c/e, +pulses  Neuro: nonfocal, +reflexes    EKG - nonspecific changes (my read)  Chart and consultant notes personally reviewed.  Care Discussed with Consultants/Other Providers/ Housestaff [ x] YES [ ] NO   Radiology, labs, old records personally reviewed.    # Recurrent seizures due to noncompliance  - Appreciate Neuro Rec's   - Regular EEG  - f/u keppra, depakote levels. If WNL, continue home dose of keppra 1500mg bid, Depakote 1000mg bid.  - keep Mg >2  - seizure precautions  -?need for Clonazepam    # Schizophrenia - Resume home meds  # DVT PPx - Heparin 5000 mg SubQ     # Activity - Increase as Tolerated    # Dispo - Patient to be discharged when medically optimized.  # Code Status - (X) FULL 7 M PMHx of seizure disorder, schizophrenia, hepatitis C, from [facility] with history of noncompliance with seizure medications as he reportedly frequently presents to ED with seizures while not taking medicine properly due to increased lethargy. Today pt was bought in by EMS after a witnessed tonic-clonic seizure at [facility] roughly 1 hour prior to arrival, lasting roughly 1 minute. Patient is reportedly having 2-3 seizures/week and then becomes too lethargic in postictal state to take medications. Patient obtunded on exam, likely postictal, but arousable to strong physical stimuli and pain. Head CT shows no acute abnormality. Recently admitted for similar episode Jan 202 and Keppra adjusted by neuro .    Denies CP, SOB, N/V/D/C/AP, cough, F, chills, dizziness, new focal weakness, HA, vision changes, dysuria, or urinary symptoms, blood in stool.    ROS: all systems unremarkable except as above.     Gen: NAD, alert but very somnolent in am  HEENT: PERRLA, EOMI, no LAD  CV: nl S1 S2  Resp: decreased BS b/l  GI: NT/ND/S +BS  MS: no c/c/e, +pulses  Neuro: nonfocal, +reflexes    EKG - nonspecific changes (my read)  Chart and consultant notes personally reviewed.  Care Discussed with Consultants/Other Providers/ Housestaff [ x] YES [ ] NO   Radiology, labs, old records personally reviewed.    # Recurrent seizures due to noncompliance  - Appreciate Neuro Rec's   - Regular EEG  - f/u keppra, depakote levels. If WNL, continue home dose of keppra 1500mg bid, Depakote 1000mg bid.  - keep Mg >2  - seizure precautions  -?need for Clonazepam    # Schizophrenia - Resume home meds    # Sedation  -change Clonazepam to PRN    # DVT PPx - Heparin 5000 mg SubQ     # Activity - Increase as Tolerated    # Dispo - Patient to be discharged when medically optimized.  # Code Status - (X) FULL

## 2020-01-29 NOTE — CONSULT NOTE ADULT - ASSESSMENT
Assessment: Patient is a 35 y/o M with PMH of seizure disorder, schizophrenia, hepatitis C, with history of noncompliance with seizure medications who presented following tonic-clonic seizure in context of medication noncompliance. Patient was obtunded on exam, likely postictal, but arousable to strong physical stimuli and pain. Head CT shows no acute abnormality. Patient received 1000mg of keppra in the ED.      Plan:  -admit to medicine  -regular EEG  -f/u keppra, depakote levels. If WNL, continue home dose of keppra 1500mg bid, depakote 1000mg bid.  -keep Mg >2  -seizure precautions

## 2020-01-29 NOTE — ED PROVIDER NOTE - CLINICAL SUMMARY MEDICAL DECISION MAKING FREE TEXT BOX
Patient presents with seizure. Has known seizure disorder but as per staff at [facility] has been having more episodes and post ictal states lasting longer. Staff having harder time arousing patient to take his medications. labs, ct done. no acute findings. Keppra given. Discussed with neurology who recommends admission for further evaluation.

## 2020-01-29 NOTE — ED PROVIDER NOTE - OBJECTIVE STATEMENT
36 yo M pmh of seizures and schizophrenia presents from [facility] for a seizure. As per  who is with the patient (Harley Dudley - ). States that less than 1 hour prior to arrival patient had a full tonic clonci seizure episode. Lasted for 1 minute then stopped on its own. States that his seizure activity is increasing. He is now having 2-3 episodes per week. Also reports that the staff is having a harder time arousing him. States that he will commonly sleep through his medications and has not been taking them as prescribed. Patient recently in obs for a similar episode, evaluated by neurology at that time. Has not yet followed up with a neurologist as an outpatient. no other recent changes.

## 2020-01-29 NOTE — ED PROVIDER NOTE - PHYSICAL EXAMINATION
CONSTITUTIONAL: Well-developed; well-nourished; in no acute distress.   SKIN: warm, dry  HEAD: Normocephalic; atraumatic.  EYES: PERRL, EOMI, no conjunctival erythema  ENT: No nasal discharge; airway clear.  NECK: Supple; non tender.  CARD: S1, S2 normal;  Regular rate and rhythm.   RESP: No wheezes, rales or rhonchi.  ABD: soft non tender, non distended, no rebound or guarding  EXT: Normal ROM.    LYMPH: No acute cervical adenopathy.  NEURO: sleeping but arousable to painful stimuli, following commands.

## 2020-01-29 NOTE — H&P ADULT - NSICDXFAMILYHX_GEN_ALL_CORE_FT
FAMILY HISTORY:  Mother  Still living? Unknown  Family history of essential hypertension, Age at diagnosis: Age Unknown

## 2020-01-29 NOTE — CONSULT NOTE ADULT - SUBJECTIVE AND OBJECTIVE BOX
Neurology Consult    HPI:  Patient is a 37 y/o M with PMH of seizure disorder, schizophrenia, hepatitis C, with history of noncompliance with seizure medications as he reportedly frequently presents to ED with seizures while not taking medicine properly due to increased lethargy. Today pt was bought in by EMS after a witnessed tonic-clonic seizure at [facility] roughly 1 hour prior to arrival, lasting roughly 1 minute. Patient is reportedly having 2-3 seizures/week and then becomes too lethargic in postictal state to take medications.  Patient obtunded on exam, likely postictal, but arousable to strong physical stimuli and pain. Head CT shows no acute abnormality.      PAST MEDICAL & SURGICAL HISTORY:  Hep C w/o coma, chronic  Schizophrenia  Seizure  No significant past surgical history      FAMILY HISTORY:  Family history of essential hypertension (Mother)      Social History: (-) x 3    Allergies    No Known Allergies      Home Medications:  divalproex sodium 500 mg oral delayed release tablet: 2 tab(s) orally 2 times a day (31 Dec 2019 10:30)  Keppra 1000 mg oral tablet: 1.5  orally 2 times a day (06 Jan 2020 16:40)  RisperDAL Consta 37.5 mg/2 weeks intramuscular injection, extended release:  (27 Dec 2019 15:13)  Vitamin D3 1000 intl units (25 mcg) oral tablet: 1 tab(s) orally once a day (27 Dec 2019 15:13)      Review of systems:    Unable to complete as patient too lethargic      Vital Signs Last 24 Hrs  T(C): 36.1 (29 Jan 2020 16:31), Max: 36.5 (29 Jan 2020 14:31)  T(F): 96.9 (29 Jan 2020 16:31), Max: 97.7 (29 Jan 2020 14:31)  HR: 44 (29 Jan 2020 16:31) (44 - 82)  BP: 102/63 (29 Jan 2020 16:31) (102/63 - 105/58)  RR: 17 (29 Jan 2020 16:31) (16 - 17)  SpO2: 100% (29 Jan 2020 16:31) (97% - 100%)    Examination:  General:  Appearance is consistent with chronologic age.  No abnormal facies.  Gross skin survey within normal limits.    Cognitive/Language:  patient obtunded, responding only to vigorous physical stimuli, nonverbal  Eyes:  PERRL  Face:  No facial asymmetry noted  Motor examination:  No twitching or involuntary movements noted, able to squeeze fingers bilaterally and wiggle toes  Sensory examination:  patient withdraws to pain in all 4 extremities    Respiratory:  no audible wheezing or inspiratory stridor.  no use of accessory muscles.   Cardiac: pulse palpable, no audible bruits  Abdomen: supple, no guarding, no TTP    Labs:   CBC Full  -  ( 29 Jan 2020 13:28 )  WBC Count : 5.93 K/uL  RBC Count : 4.32 M/uL  Hemoglobin : 14.2 g/dL  Hematocrit : 39.8 %  Platelet Count - Automated : 154 K/uL  Mean Cell Volume : 92.1 fL  Mean Cell Hemoglobin : 32.9 pg  Mean Cell Hemoglobin Concentration : 35.7 g/dL  Auto Neutrophil # : 3.22 K/uL  Auto Lymphocyte # : 1.94 K/uL  Auto Monocyte # : 0.70 K/uL  Auto Eosinophil # : 0.02 K/uL  Auto Basophil # : 0.02 K/uL  Auto Neutrophil % : 54.4 %  Auto Lymphocyte % : 32.7 %  Auto Monocyte % : 11.8 %  Auto Eosinophil % : 0.3 %  Auto Basophil % : 0.3 %    01-29    140  |  106  |  12  ----------------------------<  98  4.6   |  23  |  0.9    Ca    9.2      29 Jan 2020 13:28    TPro  6.5  /  Alb  3.6  /  TBili  0.2  /  DBili  x   /  AST  19  /  ALT  10  /  AlkPhos  17<L>  01-29    LIVER FUNCTIONS - ( 29 Jan 2020 13:28 )  Alb: 3.6 g/dL / Pro: 6.5 g/dL / ALK PHOS: 17 U/L / ALT: 10 U/L / AST: 19 U/L / GGT: x                   Neuroimaging:  NCHCT: CT Head No Cont:   EXAM:  CT BRAIN            PROCEDURE DATE:  01/29/2020            INTERPRETATION:  Clinical History / Reason for exam: Seizure    Technique: Noncontrast head CT.  Contiguous unenhanced CT axial images of the head from the base to the vertex with coronal and sagittal reformats.    Comparison: CT head 1/20/2020    Findings:    The ventricles and cortical sulci are normal in size and configuration.   There is no acute intracranial hemorrhage, extra-axial fluid collection or midline shift.  Gray-white matter differentiation is maintained.     The visualized paranasal sinuses and mastoids are well-aerated.    IMPRESSION:     No evidence of acute intracranial pathology. Stable exam since 1/20/2020.        LORENZO SPICER M.D., ATTENDING RADIOLOGIST  This document has been electronically signed. Jan 29 2020  2:49PM

## 2020-01-29 NOTE — H&P ADULT - NSHPLABSRESULTS_GEN_ALL_CORE
14.2   5.93  )-----------( 154      ( 29 Jan 2020 13:28 )             39.8   01-29    140  |  106  |  12  ----------------------------<  98  4.6   |  23  |  0.9    Ca    9.2      29 Jan 2020 13:28    TPro  6.5  /  Alb  3.6  /  TBili  0.2  /  DBili  x   /  AST  19  /  ALT  10  /  AlkPhos  17<L>  01-29    < from: CT Head No Cont (01.29.20 @ 14:44) >    IMPRESSION:     No evidence of acute intracranial pathology. Stable exam since 1/20/2020.      < end of copied text >

## 2020-01-29 NOTE — H&P ADULT - ASSESSMENT
37 M with PMH of seizure disorder, schizophrenia, hepatitis C, with history of noncompliance with seizure medications who presented following tonic-clonic seizure in context of medication noncompliance. Patient was obtunded on exam, likely postictal, but arousable to strong physical stimuli and pain. Head CT shows no acute abnormality. Patient received 1000mg of keppra in the ED.    # Recurrent difficult to control seizures  - Appreciate Neuro Rec's   - Regular EEG  - f/u keppra, depakote levels. If WNL, continue home dose of keppra 1500mg bid, Depakote 1000mg bid.  - keep Mg >2  - seizure precautions    # Schizophrenia - Resume home meds  # DVT PPx - Heparin 5000 mg SubQ     # Activity - Increase as Tolerated    # Dispo - Patient to be discharged when medically optimized.  # Code Status - (X) FULL

## 2020-01-29 NOTE — H&P ADULT - NSHPPHYSICALEXAM_GEN_ALL_CORE
Vital Signs Last 24 Hrs  T(F): 96.9 (29 Jan 2020 16:31), Max: 97.7 (29 Jan 2020 14:31)  HR: 44 (29 Jan 2020 16:31) (44 - 82)  BP: 102/63 (29 Jan 2020 16:31) (102/63 - 105/58)  RR: 17 (29 Jan 2020 16:31) (16 - 17)  SpO2: 100% (29 Jan 2020 16:31) (97% - 100%)    General:  NAD. Difficult to arouse .   Cognitive/Language:  patient obtunded, responding only to vigorous physical stimuli, nonverbal  Eyes:  PERRL  Face:  No facial asymmetry noted  Motor examination:  No twitching or involuntary movements noted, able to squeeze fingers bilaterally and wiggle toes  Respiratory:  no audible wheezing or inspiratory stridor.  no use of accessory muscles.   Cardiac: pulse palpable, no audible bruits  Abdomen: supple, no guarding, no TTP

## 2020-01-30 LAB
ANION GAP SERPL CALC-SCNC: 13 MMOL/L — SIGNIFICANT CHANGE UP (ref 7–14)
BUN SERPL-MCNC: 11 MG/DL — SIGNIFICANT CHANGE UP (ref 10–20)
CALCIUM SERPL-MCNC: 9.2 MG/DL — SIGNIFICANT CHANGE UP (ref 8.5–10.1)
CHLORIDE SERPL-SCNC: 104 MMOL/L — SIGNIFICANT CHANGE UP (ref 98–110)
CO2 SERPL-SCNC: 24 MMOL/L — SIGNIFICANT CHANGE UP (ref 17–32)
CREAT SERPL-MCNC: 0.9 MG/DL — SIGNIFICANT CHANGE UP (ref 0.7–1.5)
GLUCOSE SERPL-MCNC: 98 MG/DL — SIGNIFICANT CHANGE UP (ref 70–99)
HCT VFR BLD CALC: 40.9 % — LOW (ref 42–52)
HGB BLD-MCNC: 14.4 G/DL — SIGNIFICANT CHANGE UP (ref 14–18)
MAGNESIUM SERPL-MCNC: 1.9 MG/DL — SIGNIFICANT CHANGE UP (ref 1.8–2.4)
MCHC RBC-ENTMCNC: 32.5 PG — HIGH (ref 27–31)
MCHC RBC-ENTMCNC: 35.2 G/DL — SIGNIFICANT CHANGE UP (ref 32–37)
MCV RBC AUTO: 92.3 FL — SIGNIFICANT CHANGE UP (ref 80–94)
NRBC # BLD: 0 /100 WBCS — SIGNIFICANT CHANGE UP (ref 0–0)
PLATELET # BLD AUTO: 146 K/UL — SIGNIFICANT CHANGE UP (ref 130–400)
POTASSIUM SERPL-MCNC: 4 MMOL/L — SIGNIFICANT CHANGE UP (ref 3.5–5)
POTASSIUM SERPL-SCNC: 4 MMOL/L — SIGNIFICANT CHANGE UP (ref 3.5–5)
RBC # BLD: 4.43 M/UL — LOW (ref 4.7–6.1)
RBC # FLD: 11.9 % — SIGNIFICANT CHANGE UP (ref 11.5–14.5)
SODIUM SERPL-SCNC: 141 MMOL/L — SIGNIFICANT CHANGE UP (ref 135–146)
VALPROATE SERPL-MCNC: 81 UG/ML — SIGNIFICANT CHANGE UP (ref 50–100)
WBC # BLD: 6.72 K/UL — SIGNIFICANT CHANGE UP (ref 4.8–10.8)
WBC # FLD AUTO: 6.72 K/UL — SIGNIFICANT CHANGE UP (ref 4.8–10.8)

## 2020-01-30 PROCEDURE — 99223 1ST HOSP IP/OBS HIGH 75: CPT | Mod: AI

## 2020-01-30 PROCEDURE — 99232 SBSQ HOSP IP/OBS MODERATE 35: CPT

## 2020-01-30 RX ORDER — CLONAZEPAM 1 MG
0.5 TABLET ORAL EVERY 12 HOURS
Refills: 0 | Status: DISCONTINUED | OUTPATIENT
Start: 2020-01-30 | End: 2020-02-01

## 2020-01-30 RX ADMIN — Medication 1000 UNIT(S): at 11:23

## 2020-01-30 RX ADMIN — DIVALPROEX SODIUM 1000 MILLIGRAM(S): 500 TABLET, DELAYED RELEASE ORAL at 17:20

## 2020-01-30 RX ADMIN — LEVETIRACETAM 1500 MILLIGRAM(S): 250 TABLET, FILM COATED ORAL at 05:14

## 2020-01-30 RX ADMIN — CHLORHEXIDINE GLUCONATE 1 APPLICATION(S): 213 SOLUTION TOPICAL at 05:12

## 2020-01-30 RX ADMIN — DIVALPROEX SODIUM 1000 MILLIGRAM(S): 500 TABLET, DELAYED RELEASE ORAL at 05:14

## 2020-01-30 RX ADMIN — Medication 0.5 MILLIGRAM(S): at 05:13

## 2020-01-30 RX ADMIN — LEVETIRACETAM 1500 MILLIGRAM(S): 250 TABLET, FILM COATED ORAL at 17:20

## 2020-01-30 RX ADMIN — HEPARIN SODIUM 5000 UNIT(S): 5000 INJECTION INTRAVENOUS; SUBCUTANEOUS at 17:20

## 2020-01-30 RX ADMIN — HEPARIN SODIUM 5000 UNIT(S): 5000 INJECTION INTRAVENOUS; SUBCUTANEOUS at 05:13

## 2020-01-30 NOTE — PROGRESS NOTE ADULT - ASSESSMENT
Assessment: Patient is a 37 y/o M with PMH of seizure disorder, schizophrenia, hepatitis C, with history of noncompliance with seizure medications who presented following tonic-clonic seizure in context of medication noncompliance. Patient was obtunded on exam, likely postictal, but arousable to strong physical stimuli and pain. Head CT shows no acute abnormality. Patient received 1000mg of keppra in the ED. Patient remains obtunded on exam today. Depakote levels taken yesterday were WNL, then subtherapeutic, however this was in context of patient noncompliance. Will hold any medication adjustment until levels return with patient consistently receiving prescribed antiepileptic medications.      Plan:  -f/u regular EEG  -check repeat keppra, depakote trough levels in AM, once patient has been continuously receiving prescribed antiepileptics  -continue home dose of keppra 1500mg bid, depakote 1000mg bid for now  -keep Mg >2  -seizure precautions Assessment: Patient is a 35 y/o M with PMH of seizure disorder, schizophrenia, hepatitis C, with history of noncompliance with seizure medications who presented following tonic-clonic seizure in context of medication noncompliance. Patient was obtunded on exam, likely postictal, but arousable to strong physical stimuli and pain. Head CT shows no acute abnormality. Patient received 1000mg of keppra in the ED. Patient remains obtunded on exam today. Depakote levels taken yesterday were WNL, then subtherapeutic, however this was in context of patient noncompliance. Will hold any medication adjustment until levels return with patient consistently receiving prescribed antiepileptic medications.      Plan:  -f/u regular EEG  -check repeat keppra, depakote trough levels in AM, once patient has been continuously receiving prescribed antiepileptics  -continue home dose of keppra 1500mg bid, depakote 1000mg bid for now  -keep Mg >2  -maintain seizure precautions

## 2020-01-30 NOTE — CONSULT NOTE ADULT - SUBJECTIVE AND OBJECTIVE BOX
HPI:  37 M PMHx of seizure disorder, schizophrenia, hepatitis C, from [facility] with history of noncompliance with seizure medications as he reportedly frequently presents to ED with seizures while not taking medicine properly due to increased lethargy. Today pt was bought in by EMS after a witnessed tonic-clonic seizure at [facility] roughly 1 hour prior to arrival, lasting roughly 1 minute. Patient is reportedly having 2-3 seizures/week and then becomes too lethargic in postictal state to take medications. Patient obtunded on exam, likely postictal, but arousable to strong physical stimuli and pain. Head CT shows no acute abnormality. Recently admitted for similar episode Jan 202 and Keppra adjusted by neuro . (29 Jan 2020 17:03)      PAST MEDICAL & SURGICAL HISTORY:  Hep C w/o coma, chronic  Schizophrenia  Seizure  No significant past surgical history      Hospital Course:    TODAY'S SUBJECTIVE & REVIEW OF SYMPTOMS:     Constitutional WNL   Cardio WNL   Resp WNL   GI WNL  Heme WNL  Endo WNL  Skin WNL  MSK WNL  Neuro WNL  Cognitive WNL  Psych schizophrenia      MEDICATIONS  (STANDING):  chlorhexidine 4% Liquid 1 Application(s) Topical <User Schedule>  cholecalciferol 1000 Unit(s) Oral daily  clonazePAM  Tablet 0.5 milliGRAM(s) Oral two times a day  diVALproex DR 1000 milliGRAM(s) Oral every 12 hours  heparin  Injectable 5000 Unit(s) SubCutaneous every 12 hours  levETIRAcetam 1500 milliGRAM(s) Oral two times a day  risperiDONE Injectable, Long Acting 37.5 milliGRAM(s) IntraMuscular once    MEDICATIONS  (PRN):      FAMILY HISTORY:  Family history of essential hypertension (Mother)      Allergies    No Known Allergies    Intolerances        SOCIAL HISTORY:    [  ] Etoh  [  ] Smoking  [  ] Substance abuse     Home Environment:  [  ] Home Alone  [  ] Lives with Family  [  ] Home Health Aid    Dwelling:  [  ] Apartment  [  ] Private House  [  ] Adult Home  [ x ] Skilled Nursing Facility      [  ] Short Term  [  ] Long Term  [  ] Stairs       Elevator [  ]    FUNCTIONAL STATUS PTA: (Check all that apply)  Ambulation: [ x  ]Independent    [  ] Dependent     [  ] Non-Ambulatory  Assistive Device: [  ] SA Cane  [  ]  Q Cane  [  ] Walker  [  ]  Wheelchair  ADL : [ x ] Independent  [  ]  Dependent       Vital Signs Last 24 Hrs  T(C): 36.2 (30 Jan 2020 13:43), Max: 36.5 (30 Jan 2020 05:51)  T(F): 97.2 (30 Jan 2020 13:43), Max: 97.7 (30 Jan 2020 05:51)  HR: 52 (30 Jan 2020 13:43) (44 - 53)  BP: 112/57 (30 Jan 2020 13:43) (99/54 - 112/67)  BP(mean): 86 (30 Jan 2020 05:51) (86 - 86)  RR: 18 (30 Jan 2020 13:43) (16 - 18)  SpO2: 97% (30 Jan 2020 11:04) (97% - 100%)      PHYSICAL EXAM: Alert & awake  GENERAL: NAD  HEAD:  Atraumatic, Normocephalic  CHEST/LUNG: Clear   HEART: S1S2+  ABDOMEN: Soft, Nontender  EXTREMITIES:  no calf tenderness    NERVOUS SYSTEM:  Cranial Nerves 2-12 intact [  ] Abnormal  [  ]  ROM: WFL all extremities [  ]  Abnormal [  ]able to move all ext  Motor Strength: WFL all extremities  [  ]  Abnormal [  ]  Sensation: intact to light touch [  ] Abnormal [  ]  Reflexes: Symmetric [  ]  Abnormal [  ]    FUNCTIONAL STATUS:  Bed Mobility: Independent [  ]  Supervision [  ]  Needs Assistance [  ]  N/A [  ]  Transfers: Independent [  ]  Supervision [  ]  Needs Assistance [  ]  N/A [  ]   Ambulation: Independent [  ]  Supervision [  ]  Needs Assistance [  ]  N/A [  ]  ADL: Independent [  ] Requires Assistance [  ] N/A [  ]      LABS:                        14.4   6.72  )-----------( 146      ( 30 Jan 2020 06:23 )             40.9     01-30    141  |  104  |  11  ----------------------------<  98  4.0   |  24  |  0.9    Ca    9.2      30 Jan 2020 06:23  Mg     1.9     01-30    TPro  6.5  /  Alb  3.6  /  TBili  0.2  /  DBili  x   /  AST  19  /  ALT  10  /  AlkPhos  17<L>  01-29          RADIOLOGY & ADDITIONAL STUDIES:    Assesment:

## 2020-01-30 NOTE — PROGRESS NOTE ADULT - ASSESSMENT
37 M with PMH of seizure disorder, schizophrenia, hepatitis C, with history of noncompliance with seizure medications who presented following tonic-clonic seizure in context of medication noncompliance. Patient was obtunded on exam, likely postictal, but arousable to strong physical stimuli and pain. Head CT shows no acute abnormality. Patient received 1000mg of keppra in the ED.    # Recurrent difficult to control seizures  - F/u Neuro recs  - Regular EEG pending  - f/u keppra, depakote levels. If WNL, continue home dose of keppra 1500mg bid, Depakote 1000mg bid.  - keep Mg >2  - seizure precautions  - f/u PT, physiatry    # Schizophrenia   - Resume home meds    # DVT PPx - Heparin 5000 mg SubQ     # Activity - Increase as Tolerated    # Dispo - Patient to be discharged when medically optimized.  # Code Status - (X) FULL 37 M with PMH of seizure disorder, schizophrenia, hepatitis C, with history of noncompliance with seizure medications who presented following tonic-clonic seizure in context of medication noncompliance. Patient was obtunded on exam, likely postictal, but arousable to strong physical stimuli and pain. Head CT shows no acute abnormality. Patient received 1000mg of keppra in the ED.    # Recurrent difficult to control seizures  - F/u Neuro recs  - Regular EEG pending  - f/u keppra, depakote levels. If WNL, continue home dose of keppra 1500mg bid, Depakote 1000mg bid.  - keep Mg >2  - seizure precautions  - f/u PT, physiatry  - F/u TSH    # Schizophrenia   - Resume home meds    # DVT PPx - Heparin 5000 mg SubQ     # Activity - Increase as Tolerated    # Dispo - Patient to be discharged when medically optimized.  # Code Status - (X) FULL

## 2020-01-30 NOTE — CONSULT NOTE ADULT - ASSESSMENT
IMPRESSION: Rehab of gait dysfunction    PRECAUTIONS: [  ] Cardiac  [  ] Respiratory  [  ] Seizures [  ] Contact Isolation  [  ] Droplet Isolation  [  ] Other    Weight Bearing Status:     RECOMMENDATION:    Out of Bed to Chair     DVT/Decubiti Prophylaxis    REHAB PLAN:     [ x  ] Bedside P/T 3-5 times a week   [   ]   Bedside O/T  2-3 times a week             [   ] No Rehab Therapy Indicated                   [   ]  Speech Therapy   Conditioning/ROM                                    ADL  Bed Mobility                                               Conditioning/ROM  Transfers                                                     Bed Mobility  Sitting /Standing Balance                         Transfers                                        Gait Training                                               Sitting/Standing Balance  Stair Training [   ]Applicable                    Home equipment Eval                                                                        Splinting  [   ] Only      GOALS:   ADL   [   ]   Independent                    Transfers  [ x  ] Independent                          Ambulation  [ x  ] Independent     [  x  ] With device                            [   ]  CG                                                         [   ]  CG                                                                  [   ] CG                            [    ] Min A                                                   [   ] Min A                                                              [   ] Min  A          DISCHARGE PLAN:   [   ]  Good candidate for Intensive Rehabilitation/Hospital based                                             Will tolerate 3hrs Intensive Rehab Daily                                       [  x  ]  Short Term Rehab in Skilled Nursing Facility / psych unit                                       [    ]  Home with Outpatient or VN services                                         [    ]  Possible Candidate for Intensive Hospital based Rehab

## 2020-01-31 ENCOUNTER — APPOINTMENT (OUTPATIENT)
Dept: INTERNAL MEDICINE | Facility: CLINIC | Age: 35
End: 2020-01-31

## 2020-01-31 ENCOUNTER — TRANSCRIPTION ENCOUNTER (OUTPATIENT)
Age: 35
End: 2020-01-31

## 2020-01-31 LAB
ALBUMIN SERPL ELPH-MCNC: 4.5 G/DL — SIGNIFICANT CHANGE UP (ref 3.5–5.2)
ALP SERPL-CCNC: 22 U/L — LOW (ref 30–115)
ALT FLD-CCNC: 11 U/L — SIGNIFICANT CHANGE UP (ref 0–41)
ANION GAP SERPL CALC-SCNC: 13 MMOL/L — SIGNIFICANT CHANGE UP (ref 7–14)
AST SERPL-CCNC: 13 U/L — SIGNIFICANT CHANGE UP (ref 0–41)
BILIRUB SERPL-MCNC: 0.4 MG/DL — SIGNIFICANT CHANGE UP (ref 0.2–1.2)
BUN SERPL-MCNC: 13 MG/DL — SIGNIFICANT CHANGE UP (ref 10–20)
CALCIUM SERPL-MCNC: 9.6 MG/DL — SIGNIFICANT CHANGE UP (ref 8.5–10.1)
CHLORIDE SERPL-SCNC: 105 MMOL/L — SIGNIFICANT CHANGE UP (ref 98–110)
CO2 SERPL-SCNC: 25 MMOL/L — SIGNIFICANT CHANGE UP (ref 17–32)
CREAT SERPL-MCNC: 1 MG/DL — SIGNIFICANT CHANGE UP (ref 0.7–1.5)
CRP SERPL-MCNC: 0.2 MG/DL — SIGNIFICANT CHANGE UP (ref 0–0.4)
GLUCOSE SERPL-MCNC: 82 MG/DL — SIGNIFICANT CHANGE UP (ref 70–99)
HCT VFR BLD CALC: 43.5 % — SIGNIFICANT CHANGE UP (ref 42–52)
HGB BLD-MCNC: 15.9 G/DL — SIGNIFICANT CHANGE UP (ref 14–18)
MCHC RBC-ENTMCNC: 32.9 PG — HIGH (ref 27–31)
MCHC RBC-ENTMCNC: 36.6 G/DL — SIGNIFICANT CHANGE UP (ref 32–37)
MCV RBC AUTO: 90.1 FL — SIGNIFICANT CHANGE UP (ref 80–94)
NRBC # BLD: 0 /100 WBCS — SIGNIFICANT CHANGE UP (ref 0–0)
PLATELET # BLD AUTO: 170 K/UL — SIGNIFICANT CHANGE UP (ref 130–400)
POTASSIUM SERPL-MCNC: 4.4 MMOL/L — SIGNIFICANT CHANGE UP (ref 3.5–5)
POTASSIUM SERPL-SCNC: 4.4 MMOL/L — SIGNIFICANT CHANGE UP (ref 3.5–5)
PROT SERPL-MCNC: 7.5 G/DL — SIGNIFICANT CHANGE UP (ref 6–8)
RBC # BLD: 4.83 M/UL — SIGNIFICANT CHANGE UP (ref 4.7–6.1)
RBC # FLD: 11.7 % — SIGNIFICANT CHANGE UP (ref 11.5–14.5)
SODIUM SERPL-SCNC: 143 MMOL/L — SIGNIFICANT CHANGE UP (ref 135–146)
TSH SERPL-MCNC: 1.06 UIU/ML — SIGNIFICANT CHANGE UP (ref 0.27–4.2)
VALPROATE SERPL-MCNC: 55 UG/ML — SIGNIFICANT CHANGE UP (ref 50–100)
WBC # BLD: 6.34 K/UL — SIGNIFICANT CHANGE UP (ref 4.8–10.8)
WBC # FLD AUTO: 6.34 K/UL — SIGNIFICANT CHANGE UP (ref 4.8–10.8)

## 2020-01-31 PROCEDURE — 99232 SBSQ HOSP IP/OBS MODERATE 35: CPT

## 2020-01-31 PROCEDURE — 95822 EEG COMA OR SLEEP ONLY: CPT | Mod: 26

## 2020-01-31 PROCEDURE — 99239 HOSP IP/OBS DSCHRG MGMT >30: CPT

## 2020-01-31 RX ORDER — CLONAZEPAM 1 MG
1 TABLET ORAL
Qty: 0 | Refills: 0 | DISCHARGE
Start: 2020-01-31

## 2020-01-31 RX ORDER — LEVETIRACETAM 250 MG/1
2 TABLET, FILM COATED ORAL
Qty: 0 | Refills: 0 | DISCHARGE
Start: 2020-01-31

## 2020-01-31 RX ORDER — DIVALPROEX SODIUM 500 MG/1
2 TABLET, DELAYED RELEASE ORAL
Qty: 0 | Refills: 0 | DISCHARGE
Start: 2020-01-31

## 2020-01-31 RX ADMIN — Medication 1000 UNIT(S): at 11:00

## 2020-01-31 RX ADMIN — LEVETIRACETAM 1500 MILLIGRAM(S): 250 TABLET, FILM COATED ORAL at 05:41

## 2020-01-31 RX ADMIN — HEPARIN SODIUM 5000 UNIT(S): 5000 INJECTION INTRAVENOUS; SUBCUTANEOUS at 17:02

## 2020-01-31 RX ADMIN — LEVETIRACETAM 1500 MILLIGRAM(S): 250 TABLET, FILM COATED ORAL at 17:02

## 2020-01-31 RX ADMIN — DIVALPROEX SODIUM 1000 MILLIGRAM(S): 500 TABLET, DELAYED RELEASE ORAL at 05:41

## 2020-01-31 RX ADMIN — HEPARIN SODIUM 5000 UNIT(S): 5000 INJECTION INTRAVENOUS; SUBCUTANEOUS at 05:42

## 2020-01-31 RX ADMIN — DIVALPROEX SODIUM 1000 MILLIGRAM(S): 500 TABLET, DELAYED RELEASE ORAL at 17:02

## 2020-01-31 NOTE — PROGRESS NOTE ADULT - ATTENDING COMMENTS
I have personally seen and examined this patient.  I have fully participated in the care of this patient.  I have reviewed all pertinent clinical information, including history, physical exam, plan and note.   I have reviewed all pertinent clinical information and reviewed all relevant imaging and diagnostic studies personally.  Recommendations as above.  Agree with above assessment except as noted.
I have personally seen and examined this patient.  I have fully participated in the care of this patient.  I have reviewed all pertinent clinical information, including history, physical exam, plan and note.   I have reviewed all pertinent clinical information and reviewed all relevant imaging and diagnostic studies personally. Patient is lethargic but arousable with strong stimulation. Not on status. REEG is being done today. Not compliant with AED. Levels were subtherapeutic. Received his recommended doses in the hospital. Check the VPA in AM. Continue current AED regimen. Possible DC tomorrow.  Agree with above assessment except as noted.

## 2020-01-31 NOTE — PHYSICAL THERAPY INITIAL EVALUATION ADULT - MANUAL MUSCLE TESTING RESULTS, REHAB EVAL
Render Post-Care Instructions In Note?: yes
Post-Care Instructions: I reviewed with the patient in detail post-care instructions. Patient is to wear sunprotection, and avoid picking at any of the treated lesions. Pt may apply Aquaphor to crusted or scabbing areas.
Duration Of Freeze Thaw-Cycle (Seconds): 5
Detail Level: Detailed
Consent: The patient's consent was obtained including but not limited to risks of crusting, scabbing, blistering, scarring, darker or lighter pigmentary change, recurrence, incomplete removal and infection.
BLE 4/5

## 2020-01-31 NOTE — DISCHARGE NOTE PROVIDER - CARE PROVIDER_API CALL
Primary Medical Doctor,   Phone: (   )    -  Fax: (   )    -  Follow Up Time: 1 week    psychiatrist,   Phone: (   )    -  Fax: (   )    -  Follow Up Time: 1 week    Alice Rivas)  Neurology  [address] [place] [address], [place] [address]  Phone: [phone]  Fax: [phone]  Follow Up Time: 1 week

## 2020-01-31 NOTE — DISCHARGE NOTE PROVIDER - NSDCMRMEDTOKEN_GEN_ALL_CORE_FT
clonazePAM 0.5 mg oral tablet: 1 tab(s) orally every 12 hours, As needed, anxiety  divalproex sodium 500 mg oral delayed release tablet: 2 tab(s) orally every 12 hours  levETIRAcetam 750 mg oral tablet: 2 tab(s) orally 2 times a day  RisperDAL Consta 37.5 mg/2 weeks intramuscular injection, extended release:   Vitamin D3 1000 intl units (25 mcg) oral tablet: 1 tab(s) orally once a day

## 2020-01-31 NOTE — DISCHARGE NOTE PROVIDER - NSDCCPCAREPLAN_GEN_ALL_CORE_FT
PRINCIPAL DISCHARGE DIAGNOSIS  Diagnosis: Seizure  Assessment and Plan of Treatment:       SECONDARY DISCHARGE DIAGNOSES  Diagnosis: Schizophrenia  Assessment and Plan of Treatment: PRINCIPAL DISCHARGE DIAGNOSIS  Diagnosis: Seizure  Assessment and Plan of Treatment: Due to poor compliance. Please ensure compliance with seziure medications to avoid recurrent seizures. Follow up with neurologist as outpt.

## 2020-01-31 NOTE — DISCHARGE NOTE PROVIDER - PROVIDER TOKENS
FREE:[LAST:[Primary Medical Doctor],PHONE:[(   )    -],FAX:[(   )    -],FOLLOWUP:[1 week]],FREE:[LAST:[psychiatrist],PHONE:[(   )    -],FAX:[(   )    -],FOLLOWUP:[1 week]],PROVIDER:[TOKEN:[[id number]],FOLLOWUP:[1 week]]

## 2020-01-31 NOTE — DISCHARGE NOTE PROVIDER - HOSPITAL COURSE
37 year old M with PMHx of seizure disorder with history of noncompliance with seizure medications, schizophrenia, hepatitis C, from [facility] presents to ED with seizures while not taking medicine properly due to increased lethargy. Today pt was bought in by EMS after a witnessed tonic-clonic seizure at [facility] roughly 1 hour prior to arrival, lasting roughly 1 minute. Patient is reportedly having 2-3 seizures/week and then becomes too lethargic in postictal state to take medications. Patient was recently admitted for similar episode Jan 2020 and Keppra dose was adjusted by neuro.         During this hospital stay, CT head showed no abnormalities. EEG showed mild generalized slowing. 37 year old M with PMHx of seizure disorder with history of noncompliance with seizure medications, schizophrenia, hepatitis C, from [facility] presents to ED with seizures while not taking medicine properly due to increased lethargy. Today pt was bought in by EMS after a witnessed tonic-clonic seizure at [facility] roughly 1 hour prior to arrival, lasting roughly 1 minute. Patient is reportedly having 2-3 seizures/week and then becomes too lethargic in postictal state to take medications. Patient was recently admitted for similar episode Jan 2020 and Keppra dose was adjusted by neuro.         During this hospital stay, CT head showed no abnormalities. EEG showed mild generalized slowing.         Patient is stable for discharge to [facility] on Keppra and Depakote and outpatient follow-up with neurology. 37 year old M with PMHx of seizure disorder with history of noncompliance with seizure medications, schizophrenia, hepatitis C, from [facility] presents to ED with seizures while not taking medicine properly due to increased lethargy. Today pt was bought in by EMS after a witnessed tonic-clonic seizure at [facility] roughly 1 hour prior to arrival, lasting roughly 1 minute. Patient is reportedly having 2-3 seizures/week and then becomes too lethargic in postictal state to take medications. Patient was recently admitted for similar episode Jan 2020 and Keppra dose was adjusted by neuro.         During this hospital stay, CT head showed no abnormalities. EEG showed mild generalized slowing.         Patient is stable for discharge to [facility] on Keppra and Depakote and outpatient follow-up with neurology.         Attending Attestation        Pt has been seen and examined. Case and Plan discussed at rounds and agree with above summary.    Pt looks comfortable and with no acute distress awake and says he wants to go back home to [place]         Vital Signs Last 24 Hrs    T(C): 36.8 (01 Feb 2020 05:46), Max: 36.8 (31 Jan 2020 21:45)    T(F): 98.3 (01 Feb 2020 05:46), Max: 98.3 (01 Feb 2020 05:46)    HR: 58 (01 Feb 2020 05:46) (50 - 61)    BP: 109/55 (01 Feb 2020 05:46) (101/58 - 109/55)-    RR: 18 (01 Feb 2020 05:46) (18 - 19)        PHYSICAL EXAM:    GENERAL: NAD, well-developed    HEAD:  Atraumatic, Normocephalic    EYES: EOMI, PERRLA, conjunctiva and sclera clear    NECK: Supple, No JVD    CHEST/LUNG: Clear to auscultation bilaterally; No wheeze    HEART: Regular rate and rhythm; No murmurs, rubs, or gallops    ABDOMEN: Soft, Nontender, Nondistended; Bowel sounds present    EXTREMITIES:  2+ Peripheral Pulses, No clubbing, cyanosis, or edema    PSYCH: AAOx3    NEUROLOGY: non-focal    SKIN: No rashes or lesions                                15.9     6.33  )-----------( 197      ( 01 Feb 2020 06:29 )               45.3             02-01        141  |  105  |  17    ----------------------------<  81    4.5   |  24  |  1.0        Ca    9.9      01 Feb 2020 06:29        TPro  7.5  /  Alb  4.3  /  TBili  0.3  /  DBili  x   /  AST  13  /  ALT  12  /  AlkPhos  21<L>  02-01        MEDS:     ClonazePAM 0.5 mg oral tablet: 1 tab(s) orally every 12 hours, As needed, anxiety    Divalproex sodium 500 mg oral delayed release tablet: 2 tab(s) orally every 12 hours    LevETIRAcetam 750 mg oral tablet: 2 tab(s) orally 2 times a day    RisperDAL Consta 37.5 mg/2 weeks intramuscular injection, extended release:     Vitamin D3 1000 intl units (25 mcg) oral tablet: 1 tab(s) orally once a day

## 2020-01-31 NOTE — PROGRESS NOTE ADULT - ASSESSMENT
Assessment: Patient is a 38 y/o M with PMH of seizure disorder, schizophrenia, hepatitis C, with history of noncompliance with seizure medications who presented following tonic-clonic seizure in context of medication noncompliance. Patient appears improved resumed on home antiepileptic medications, depakote levels WNL, EEG showed no epileptiform activity. Patient appears back to baseline. Feels ready for d/c back to [place].      Plan:  -continue home dose of keppra 1500mg bid, depakote 1000mg bid  -may follow up as outpatient  -emphasize importance of medication compliance upon discharge  -maintain Mg >2  -seizure precautions

## 2020-01-31 NOTE — DISCHARGE NOTE PROVIDER - CARE PROVIDERS DIRECT ADDRESSES
,DirectAddress_Unknown,DirectAddress_Unknown,[email or web address]

## 2020-02-01 ENCOUNTER — TRANSCRIPTION ENCOUNTER (OUTPATIENT)
Age: 35
End: 2020-02-01

## 2020-02-01 VITALS
DIASTOLIC BLOOD PRESSURE: 55 MMHG | HEART RATE: 58 BPM | RESPIRATION RATE: 18 BRPM | SYSTOLIC BLOOD PRESSURE: 109 MMHG | TEMPERATURE: 98 F

## 2020-02-01 LAB
ALBUMIN SERPL ELPH-MCNC: 4.3 G/DL — SIGNIFICANT CHANGE UP (ref 3.5–5.2)
ALP SERPL-CCNC: 21 U/L — LOW (ref 30–115)
ALT FLD-CCNC: 12 U/L — SIGNIFICANT CHANGE UP (ref 0–41)
ANION GAP SERPL CALC-SCNC: 12 MMOL/L — SIGNIFICANT CHANGE UP (ref 7–14)
AST SERPL-CCNC: 13 U/L — SIGNIFICANT CHANGE UP (ref 0–41)
BILIRUB SERPL-MCNC: 0.3 MG/DL — SIGNIFICANT CHANGE UP (ref 0.2–1.2)
BUN SERPL-MCNC: 17 MG/DL — SIGNIFICANT CHANGE UP (ref 10–20)
CALCIUM SERPL-MCNC: 9.9 MG/DL — SIGNIFICANT CHANGE UP (ref 8.5–10.1)
CHLORIDE SERPL-SCNC: 105 MMOL/L — SIGNIFICANT CHANGE UP (ref 98–110)
CO2 SERPL-SCNC: 24 MMOL/L — SIGNIFICANT CHANGE UP (ref 17–32)
CREAT SERPL-MCNC: 1 MG/DL — SIGNIFICANT CHANGE UP (ref 0.7–1.5)
GLUCOSE SERPL-MCNC: 81 MG/DL — SIGNIFICANT CHANGE UP (ref 70–99)
HCT VFR BLD CALC: 45.3 % — SIGNIFICANT CHANGE UP (ref 42–52)
HGB BLD-MCNC: 15.9 G/DL — SIGNIFICANT CHANGE UP (ref 14–18)
LEVETIRACETAM SERPL-MCNC: 17.4 MCG/ML — SIGNIFICANT CHANGE UP (ref 12–46)
LEVETIRACETAM SERPL-MCNC: 54.8 MCG/ML — HIGH (ref 12–46)
MCHC RBC-ENTMCNC: 31.4 PG — HIGH (ref 27–31)
MCHC RBC-ENTMCNC: 35.1 G/DL — SIGNIFICANT CHANGE UP (ref 32–37)
MCV RBC AUTO: 89.5 FL — SIGNIFICANT CHANGE UP (ref 80–94)
NRBC # BLD: 0 /100 WBCS — SIGNIFICANT CHANGE UP (ref 0–0)
PLATELET # BLD AUTO: 197 K/UL — SIGNIFICANT CHANGE UP (ref 130–400)
POTASSIUM SERPL-MCNC: 4.5 MMOL/L — SIGNIFICANT CHANGE UP (ref 3.5–5)
POTASSIUM SERPL-SCNC: 4.5 MMOL/L — SIGNIFICANT CHANGE UP (ref 3.5–5)
PROT SERPL-MCNC: 7.5 G/DL — SIGNIFICANT CHANGE UP (ref 6–8)
RBC # BLD: 5.06 M/UL — SIGNIFICANT CHANGE UP (ref 4.7–6.1)
RBC # FLD: 11.9 % — SIGNIFICANT CHANGE UP (ref 11.5–14.5)
SODIUM SERPL-SCNC: 141 MMOL/L — SIGNIFICANT CHANGE UP (ref 135–146)
WBC # BLD: 6.33 K/UL — SIGNIFICANT CHANGE UP (ref 4.8–10.8)
WBC # FLD AUTO: 6.33 K/UL — SIGNIFICANT CHANGE UP (ref 4.8–10.8)

## 2020-02-01 PROCEDURE — 99232 SBSQ HOSP IP/OBS MODERATE 35: CPT

## 2020-02-01 RX ORDER — LEVETIRACETAM 250 MG/1
2 TABLET, FILM COATED ORAL
Qty: 60 | Refills: 0
Start: 2020-02-01 | End: 2020-02-15

## 2020-02-01 RX ORDER — DIVALPROEX SODIUM 500 MG/1
2 TABLET, DELAYED RELEASE ORAL
Qty: 60 | Refills: 0
Start: 2020-02-01 | End: 2020-02-15

## 2020-02-01 RX ADMIN — LEVETIRACETAM 1500 MILLIGRAM(S): 250 TABLET, FILM COATED ORAL at 06:30

## 2020-02-01 RX ADMIN — HEPARIN SODIUM 5000 UNIT(S): 5000 INJECTION INTRAVENOUS; SUBCUTANEOUS at 06:30

## 2020-02-01 RX ADMIN — Medication 1000 UNIT(S): at 12:16

## 2020-02-01 RX ADMIN — DIVALPROEX SODIUM 1000 MILLIGRAM(S): 500 TABLET, DELAYED RELEASE ORAL at 06:30

## 2020-02-01 NOTE — PROGRESS NOTE ADULT - SUBJECTIVE AND OBJECTIVE BOX
Neurology Progress Note    Interval History:    Patient appears significantly more awake, alert, responding slowly to questions but states feels back to baseline, feels ready to return to [place]. Emphasized importance of medication compliance to prevent future seizure episodes.    HPI:  37 M PMHx of seizure disorder, schizophrenia, hepatitis C, from [facility] with history of noncompliance with seizure medications as he reportedly frequently presents to ED with seizures while not taking medicine properly due to increased lethargy. Today pt was bought in by EMS after a witnessed tonic-clonic seizure at [facility] roughly 1 hour prior to arrival, lasting roughly 1 minute. Patient is reportedly having 2-3 seizures/week and then becomes too lethargic in postictal state to take medications. Patient obtunded on exam, likely postictal, but arousable to strong physical stimuli and pain. Head CT shows no acute abnormality. Recently admitted for similar episode Jan 202 and Keppra adjusted by neuro . (29 Jan 2020 17:03)      Vital Signs Last 24 Hrs  T(C): 36.8 (31 Jan 2020 05:00), Max: 36.8 (31 Jan 2020 05:00)  T(F): 98.3 (31 Jan 2020 05:00), Max: 98.3 (31 Jan 2020 05:00)  HR: 59 (31 Jan 2020 05:00) (48 - 59)  BP: 108/61 (31 Jan 2020 05:00) (104/55 - 112/57)  BP(mean): --  RR: 18 (31 Jan 2020 05:00) (18 - 18)  SpO2: --    Neurological Exam:   Mental status: Awake, alert and oriented x3. Speaking slowly but situationally aware. Recent and remote memory intact.  Naming, repetition and comprehension intact.  Attention/concentration intact.  No dysarthria, no aphasia.  Fund of knowledge appropriate.    Cranial nerves: Pupils equally round and reactive to light, no nystagmus, extraocular muscles intact, V1 through V3 intact bilaterally and symmetric, face symmetric, hearing grossly intact, tongue was midline.  Motor:  MRC grading 5/5 b/l UE/LE.   strength 5/5.  Normal tone and bulk.  No abnormal movements.    Sensation: Intact to light touch b/l in all extremities  Reflexes: 2+ in bilateral UE/LE  Gait: slowed but narrow and steady. No ataxia.     Medications:  MEDICATIONS  (STANDING):  chlorhexidine 4% Liquid 1 Application(s) Topical <User Schedule>  cholecalciferol 1000 Unit(s) Oral daily  diVALproex DR 1000 milliGRAM(s) Oral every 12 hours  heparin  Injectable 5000 Unit(s) SubCutaneous every 12 hours  levETIRAcetam 1500 milliGRAM(s) Oral two times a day  risperiDONE Injectable, Long Acting 37.5 milliGRAM(s) IntraMuscular once    MEDICATIONS  (PRN):  clonazePAM  Tablet 0.5 milliGRAM(s) Oral every 12 hours PRN anxiety      Labs:  CBC Full  -  ( 31 Jan 2020 05:53 )  WBC Count : 6.34 K/uL  RBC Count : 4.83 M/uL  Hemoglobin : 15.9 g/dL  Hematocrit : 43.5 %  Platelet Count - Automated : 170 K/uL  Mean Cell Volume : 90.1 fL  Mean Cell Hemoglobin : 32.9 pg  Mean Cell Hemoglobin Concentration : 36.6 g/dL  Auto Neutrophil # : x  Auto Lymphocyte # : x  Auto Monocyte # : x  Auto Eosinophil # : x  Auto Basophil # : x  Auto Neutrophil % : x  Auto Lymphocyte % : x  Auto Monocyte % : x  Auto Eosinophil % : x  Auto Basophil % : x    01-31    143  |  105  |  13  ----------------------------<  82  4.4   |  25  |  1.0    Ca    9.6      31 Jan 2020 05:53  Mg     1.9     01-30    TPro  7.5  /  Alb  4.5  /  TBili  0.4  /  DBili  x   /  AST  13  /  ALT  11  /  AlkPhos  22<L>  01-31    LIVER FUNCTIONS - ( 31 Jan 2020 05:53 )  Alb: 4.5 g/dL / Pro: 7.5 g/dL / ALK PHOS: 22 U/L / ALT: 11 U/L / AST: 13 U/L / GGT: x             Valproic Acid Level, Serum (01.31.20 @ 08:00)    Valproic Acid Level, Serum: 55.0 ug/mL    Valproic Acid Level, Serum (01.30.20 @ 22:13)    Valproic Acid Level, Serum: 81.0 ug/mL    Magnesium, Serum in AM (01.30.20 @ 06:23)    Magnesium, Serum: 1.9 mg/dL        < from: EEG (01.30.20 @ 12:00) >  EXAM:  EEG        PROCEDURE DATE:  01/30/2020        INTERPRETATION:  Exam Performed on: 16 Channel Digital Routine EEG done on [employer] recording system.    History  Seizures  HEP C W/O COMA CHRONIC, SCHIZOPHRENIA    Medications    MedicationDate  KEPPRA  2020/01/30  HEPARIN  2020/01/30  DEPAKOTE  2020/01/30  KLONOPIN  2020/01/30  VITAMIN D3  2020/01/30  RISPERDAL CONSTA  2020/01/30    State  Awake, asleep, and drowsy    Symmetry  Symmetric    Organization  Well organized     PDR   Continuous  Background: 8-9 hz    Generalized Slowing  Yes  mild - moderate    Focal Slowing  No    Breach Artifact:   No      Activation Procedure  Hyper Ventilation  No    Photic Stimulation  No    Epileptiform Activity  No    Events:  No    Impression  Abnormal due to the presence of: mild generalized slowing as above      Clinical Correlation & Recommendations   Consistent with mild diffuse cerebral electrophysiological dysfunction secondary to nonspecific cause.      Reviewed by:  Mary Kate Baker    Signed by:  Mary Kate BAKER MD  This document has been electronically signed. Jan 31 2020  7:35AM    < end of copied text >
Neurology Progress Note    Interval History:    Patient seen and examined at bedside, remains obtunded on exam, not following commands, however withdrawing extremities and yelling out in response to painful stimuli. Patient was reportedly lethargic this AM but answering questions slowly, reportedly disoriented with no recollection of events leading up to hospitalization.       HPI:  37 M PMHx of seizure disorder, schizophrenia, hepatitis C, from [facility] with history of noncompliance with seizure medications as he reportedly frequently presents to ED with seizures while not taking medicine properly due to increased lethargy. Today pt was bought in by EMS after a witnessed tonic-clonic seizure at [facility] roughly 1 hour prior to arrival, lasting roughly 1 minute. Patient is reportedly having 2-3 seizures/week and then becomes too lethargic in postictal state to take medications. Patient obtunded on exam, likely postictal, but arousable to strong physical stimuli and pain. Head CT shows no acute abnormality. Recently admitted for similar episode Jan 202 and Keppra adjusted by neuro . (29 Jan 2020 17:03)      Vital Signs Last 24 Hrs  T(C): 36.5 (30 Jan 2020 05:51), Max: 36.5 (29 Jan 2020 14:31)  T(F): 97.7 (30 Jan 2020 05:51), Max: 97.7 (29 Jan 2020 14:31)  HR: 53 (30 Jan 2020 05:51) (44 - 82)  BP: 111/69 (30 Jan 2020 05:51) (102/63 - 112/67)  BP(mean): 86 (30 Jan 2020 05:51) (86 - 86)  RR: 18 (30 Jan 2020 05:51) (16 - 18)  SpO2: 97% (30 Jan 2020 05:51) (97% - 100%)    Neurological Exam:   General:  Appearance is consistent with chronologic age.  No abnormal facies.  Gross skin survey within normal limits.    Cognitive/Language:  patient obtunded, responding only to vigorous physical stimuli, nonverbal  Eyes:  PERRL  Face:  No facial asymmetry noted  Motor examination:  No twitching or involuntary movements noted, withdrawing in response to painful stimuli  Sensory examination:  patient withdraws to pain in all 4 extremities    Medications:  MEDICATIONS  (STANDING):  chlorhexidine 4% Liquid 1 Application(s) Topical <User Schedule>  cholecalciferol 1000 Unit(s) Oral daily  clonazePAM  Tablet 0.5 milliGRAM(s) Oral two times a day  diVALproex DR 1000 milliGRAM(s) Oral every 12 hours  heparin  Injectable 5000 Unit(s) SubCutaneous every 12 hours  levETIRAcetam 1500 milliGRAM(s) Oral two times a day  risperiDONE Injectable, Long Acting 37.5 milliGRAM(s) IntraMuscular once        Labs:  CBC Full  -  ( 30 Jan 2020 06:23 )  WBC Count : 6.72 K/uL  RBC Count : 4.43 M/uL  Hemoglobin : 14.4 g/dL  Hematocrit : 40.9 %  Platelet Count - Automated : 146 K/uL  Mean Cell Volume : 92.3 fL  Mean Cell Hemoglobin : 32.5 pg  Mean Cell Hemoglobin Concentration : 35.2 g/dL  Auto Neutrophil # : x  Auto Lymphocyte # : x  Auto Monocyte # : x  Auto Eosinophil # : x  Auto Basophil # : x  Auto Neutrophil % : x  Auto Lymphocyte % : x  Auto Monocyte % : x  Auto Eosinophil % : x  Auto Basophil % : x    01-30    141  |  104  |  11  ----------------------------<  98  4.0   |  24  |  0.9    Ca    9.2      30 Jan 2020 06:23  Mg     1.9     01-30    TPro  6.5  /  Alb  3.6  /  TBili  0.2  /  DBili  x   /  AST  19  /  ALT  10  /  AlkPhos  17<L>  01-29    LIVER FUNCTIONS - ( 29 Jan 2020 13:28 )  Alb: 3.6 g/dL / Pro: 6.5 g/dL / ALK PHOS: 17 U/L / ALT: 10 U/L / AST: 19 U/L / GGT: x           Valproic Acid Level, Serum (01.29.20 @ 22:16)    Valproic Acid Level, Serum: 36.0 ug/mL    Valproic Acid Level, Serum (01.29.20 @ 13:28)    Valproic Acid Level, Serum: 59.0 ug/mL
Patient Information:  VAN WOMACK / 37y / Male / MRN#:[id number]    Hospital Day: 1d    HPI:  37 M PMHx of seizure disorder, schizophrenia, hepatitis C, from [facility] with history of noncompliance with seizure medications as he reportedly frequently presents to ED with seizures while not taking medicine properly due to increased lethargy. Today pt was bought in by EMS after a witnessed tonic-clonic seizure at [facility] roughly 1 hour prior to arrival, lasting roughly 1 minute. Patient is reportedly having 2-3 seizures/week and then becomes too lethargic in postictal state to take medications. Patient obtunded on exam, likely postictal, but arousable to strong physical stimuli and pain. Head CT shows no acute abnormality. Recently admitted for similar episode Jan 202 and Keppra adjusted by neuro .    Interval History:  Patient seen and examined at bedside. No acute events overnight.    Past Medical History:  Hep C w/o coma, chronic  Schizophrenia  Seizure    Past Surgical History:  No significant past surgical history    Allergies:  No Known Allergies    Medications:  PRN:    Standing:  chlorhexidine 4% Liquid 1 Application(s) Topical <User Schedule>  cholecalciferol 1000 Unit(s) Oral daily  clonazePAM  Tablet 0.5 milliGRAM(s) Oral two times a day  diVALproex DR 1000 milliGRAM(s) Oral every 12 hours  heparin  Injectable 5000 Unit(s) SubCutaneous every 12 hours  levETIRAcetam 1500 milliGRAM(s) Oral two times a day  risperiDONE Injectable, Long Acting 37.5 milliGRAM(s) IntraMuscular once    Home:  divalproex sodium 500 mg oral delayed release tablet: 2 tab(s) orally 2 times a day  Keppra 1000 mg oral tablet: 1.5  orally 2 times a day  RisperDAL Consta 37.5 mg/2 weeks intramuscular injection, extended release:   Vitamin D3 1000 intl units (25 mcg) oral tablet: 1 tab(s) orally once a day    Vitals:  T(C): 36.2, Max: 36.5 (01-30-20 @ 05:51)  T(F): 97.2, Max: 97.7 (01-30-20 @ 05:51)  HR: 52 (44 - 53)  BP: 112/57 (99/54 - 112/67)  RR: 18 (16 - 18)  SpO2: 97% (97% - 100%)    Physical Exam:  General: Awake, Alert. Not in acute distress.  Heart: Regular rate and rhythm; S1, S2; No murmurs.  Lungs: Clear to auscultation bilaterally.  Abdomen: Soft, nontender, nondistended.  Extremities: No edema in upper or lower extremities.  Neuro: AAOx3, No focal deficits but slow speech and responses    Labs:  CBC (01-30 @ 06:23)                        Hgb: 14.4   WBC: 6.72  )-----------------( Plts: 146                              Hct: 40.9     Chem (01-30 @ 06:23)  Na: 141  |     Cl: 104     |  BUN: 11  -----------------------------------------< Gluc: 98    K: 4.0   |    HCO3: 24  |  Cr: 0.9    Ca 9.2 (01-30 @ 06:23)  Mg 1.9 (01-30 @ 06:23)    LFTs (01-29 @ 13:28)  TPro 6.5  /  Alb 3.6  TBili 0.2  /  DBili     AST 19  /  ALT 10  /  AlkPhos 17          Microbiology:    Radiology:
Pt seen and examined independently. No new complaints. Feels well, ambulating on his own. Cleared  neuro for d/c. Counselled pt about compliance.    Pt admitted for acute seizures 2/2 medication non-compliance     Gen: NAD, AAOx3, flat affect  CV: S1 S2  Resp: Decreased BS b/l  GI: NT/ND/S +BS  MS: neg c/c/e  Neuro: nonfocal                          15.9   6.33  )-----------( 197      ( 01 Feb 2020 06:29 )             45.3       02-01    141  |  105  |  17  ----------------------------<  81  4.5   |  24  |  1.0    Ca    9.9      01 Feb 2020 06:29    TPro  7.5  /  Alb  4.3  /  TBili  0.3  /  DBili  x   /  AST  13  /  ALT  12  /  AlkPhos  21<L>  02-01    MEDICATIONS  (STANDING):  chlorhexidine 4% Liquid 1 Application(s) Topical <User Schedule>  cholecalciferol 1000 Unit(s) Oral daily  diVALproex DR 1000 milliGRAM(s) Oral every 12 hours  heparin  Injectable 5000 Unit(s) SubCutaneous every 12 hours  levETIRAcetam 1500 milliGRAM(s) Oral two times a day  risperiDONE Injectable, Long Acting 37.5 milliGRAM(s) IntraMuscular once    MEDICATIONS  (PRN):  clonazePAM  Tablet 0.5 milliGRAM(s) Oral every 12 hours PRN anxiety    D/C summary reviewed    Time spent 25min     Case d/w Rounding Team
Pt seen and examined independently. No new complaints. Feels well, ambulating on his own. Cleared bu neuro for d/c. Counselled pt about compliance.    Gen: NAD, AAOx3, flat affect  CV: S1 S2  Resp: Decreased BS b/l  GI: NT/ND/S +BS  MS: neg c/c/e  Neuro: nonfocal    Discharge instructions discussed and patient knows when to seek immediate medical attention.  Patient has proper follow up.  All results discussed and patient aware they may require further work up.  Stressed importance of proper follow up.  Medications prescribed and changes discussed.  All questions and concerns from patient addressed. Understanding of instructions verbalized.    Time spent in completing discharge process and coordinating care 35 minutes.    Discussed with housestaff, nursing, social work, neuro

## 2020-02-01 NOTE — CHART NOTE - NSCHARTNOTEFT_GEN_A_CORE
<<<RESIDENT DISCHARGE NOTE>>>     VAN WOMACK  MRN-[id number]    Vital Signs:  T(C): 36.8, Max: 36.8 (01-31 @ 21:45)  T(F): 98.3, Max: 98.3 (02-01 @ 05:46)  HR: 58 (50 - 61)  BP: 109/55 (101/58 - 109/55)  RR: 18 (18 - 19)  SpO2: --    Physical Exam:  General: Awake, Alert. Not in acute distress.  Heart: Regular rate and rhythm; S1, S2; No murmurs.  Lungs: Clear to auscultation bilaterally.  Abdomen: Soft, nontender, nondistended.  Extremities: No edema in upper or lower extremities.  Neuro: AAOx3, No focal deficits but flat affect    TEST RESULTS:  CBC (02-01 @ 06:29)                        Hgb: 15.9   WBC: 6.33  )-----------------( Plts: 197                              Hct: 45.3     Chem (02-01 @ 06:29)  Na: 141  |     Cl: 105     |  BUN: 17  -----------------------------------------< Gluc: 81    K: 4.5   |    HCO3: 24  |  Cr: 1.0    Ca 9.9 (02-01 @ 06:29)    LFTs (02-01 @ 06:29)  TPro 7.5  /  Alb 4.3  TBili 0.3  /  DBili     AST 13  /  ALT 12  /  AlkPhos 21      FINAL DISCHARGE INTERVIEW:    Resident Present: (Name: April Andujar MD)   RN Present: (Name: Jackelyn)    DISCHARGE MEDICATION RECONCILIATION    Reviewed with Attending (Name: Dr Bazzi )    DISPOSITION:     [] Home,    [] Home with Visiting Nursing Services,   []  SNF/ NH,    [] Acute Rehab (4A),   [x] Other (Specify: [facility])

## 2020-02-01 NOTE — DISCHARGE NOTE NURSING/CASE MANAGEMENT/SOCIAL WORK - PATIENT PORTAL LINK FT
You can access the FollowMyHealth Patient Portal offered by [facility] by registering at the following website: [email or web address]. By joining [employer]’s FollowMyHealth portal, you will also be able to view your health information using other applications (apps) compatible with our system.

## 2020-02-05 DIAGNOSIS — Z91.14 PATIENT'S OTHER NONCOMPLIANCE WITH MEDICATION REGIMEN: ICD-10-CM

## 2020-02-05 DIAGNOSIS — Z86.19 PERSONAL HISTORY OF OTHER INFECTIOUS AND PARASITIC DISEASES: ICD-10-CM

## 2020-02-05 DIAGNOSIS — G40.909 EPILEPSY, UNSPECIFIED, NOT INTRACTABLE, WITHOUT STATUS EPILEPTICUS: ICD-10-CM

## 2020-02-05 DIAGNOSIS — F20.9 SCHIZOPHRENIA, UNSPECIFIED: ICD-10-CM

## 2020-03-03 ENCOUNTER — APPOINTMENT (OUTPATIENT)
Dept: NEUROLOGY | Facility: CLINIC | Age: 35
End: 2020-03-03
Payer: MEDICAID

## 2020-03-03 ENCOUNTER — OUTPATIENT (OUTPATIENT)
Dept: OUTPATIENT SERVICES | Facility: HOSPITAL | Age: 35
LOS: 1 days | Discharge: HOME | End: 2020-03-03

## 2020-03-03 PROCEDURE — 99203 OFFICE O/P NEW LOW 30 MIN: CPT | Mod: GC

## 2020-03-03 NOTE — PHYSICAL EXAM
[FreeTextEntry1] : Neurological Exam: \par Mental status: Awake, alert and oriented x3.  flat affect, decreased speech output, poor insight, follows commands\par Cranial nerves: Pupils equally round and reactive to light, visual fields full, no nystagmus, extraocular muscles intact, V1 through V3 intact bilaterally and symmetric, face symmetric, hearing intact to finger rub, palate elevation symmetric, tongue was midline.\par Motor:  MRC grading 5/5 b/l UE/LE.   strength 5/5.  Normal tone and bulk.  No abnormal movements.  \par Sensation: Intact to light touch, proprioception, and pinprick. \par Coordination: No dysmetria on finger-to-nose and heel-to-shin.  No dysdiadokinesia.\par Reflexes: 2+ in bilateral UE/LE, downgoing toes bilaterally. (-) Jama.\par Gait: Narrow and steady. No ataxia.  Romberg negative\par \par

## 2020-03-03 NOTE — HISTORY OF PRESENT ILLNESS
[FreeTextEntry1] : patient is a 36 yo man with schizoaffective , bipolar d/o\par sz d/o since birth who presents to [facility]. patient has not had a neurologist in yrs\par has had 9 sz since dec. was on veeg in jan with med adjustment but has been sz free since end of jan after med change\par he is a resident of [place]\par on vpa and keppra \par keppra 1500 q 12\par vpa 1000 q 12\par klonopin lowered 0.5 mg q 12\par patient is otherwise stable\par

## 2020-03-03 NOTE — ASSESSMENT
[FreeTextEntry1] : patient is a 34 yo man with longstanding epilepsy who is currently controlled after veeg adm and med adjustment. here to esatblish care as he does not have a neurologist \par check aed  levels today \par cont vpa 1000 mg q 12 and keppra 1500 mg q 12 \par f/u in 6 mos if stable

## 2020-03-05 RX ORDER — CHOLECALCIFEROL (VITAMIN D3) 125 MCG
1 CAPSULE ORAL
Qty: 0 | Refills: 0 | DISCHARGE

## 2020-05-05 ENCOUNTER — OUTPATIENT (OUTPATIENT)
Dept: OUTPATIENT SERVICES | Facility: HOSPITAL | Age: 35
LOS: 1 days | Discharge: HOME | End: 2020-05-05

## 2020-05-05 ENCOUNTER — APPOINTMENT (OUTPATIENT)
Dept: NEUROLOGY | Facility: CLINIC | Age: 35
End: 2020-05-05
Payer: MEDICAID

## 2020-05-05 PROCEDURE — 99441: CPT | Mod: GC

## 2020-05-05 NOTE — HISTORY OF PRESENT ILLNESS
[Verbal consent obtained from patient] : the patient, [unfilled] [FreeTextEntry3] : nurse on floor [FreeTextEntry1] : patient has been seizure free since last visit\par no issues with his medications\par no side effects

## 2020-06-24 ENCOUNTER — OUTPATIENT (OUTPATIENT)
Dept: OUTPATIENT SERVICES | Facility: HOSPITAL | Age: 35
LOS: 1 days | Discharge: HOME | End: 2020-06-24

## 2020-06-24 DIAGNOSIS — Z02.9 ENCOUNTER FOR ADMINISTRATIVE EXAMINATIONS, UNSPECIFIED: ICD-10-CM

## 2020-06-24 PROBLEM — R56.9 UNSPECIFIED CONVULSIONS: Chronic | Status: ACTIVE | Noted: 2019-06-22

## 2020-06-24 PROBLEM — F20.9 SCHIZOPHRENIA, UNSPECIFIED: Chronic | Status: ACTIVE | Noted: 2019-12-26

## 2020-06-24 PROBLEM — B18.2 CHRONIC VIRAL HEPATITIS C: Chronic | Status: ACTIVE | Noted: 2019-12-26

## 2020-06-29 ENCOUNTER — OUTPATIENT (OUTPATIENT)
Dept: OUTPATIENT SERVICES | Facility: HOSPITAL | Age: 35
LOS: 1 days | Discharge: HOME | End: 2020-06-29

## 2020-06-29 ENCOUNTER — APPOINTMENT (OUTPATIENT)
Dept: INTERNAL MEDICINE | Facility: CLINIC | Age: 35
End: 2020-06-29
Payer: MEDICAID

## 2020-06-29 VITALS
HEART RATE: 60 BPM | SYSTOLIC BLOOD PRESSURE: 111 MMHG | WEIGHT: 156 LBS | BODY MASS INDEX: 23.37 KG/M2 | TEMPERATURE: 97 F | DIASTOLIC BLOOD PRESSURE: 74 MMHG | HEIGHT: 68.5 IN

## 2020-06-29 DIAGNOSIS — F20.9 SCHIZOPHRENIA, UNSPECIFIED: ICD-10-CM

## 2020-06-29 PROCEDURE — 99213 OFFICE O/P EST LOW 20 MIN: CPT | Mod: GC

## 2020-06-29 NOTE — ASSESSMENT
[FreeTextEntry1] : Patient from [facility]\par 35 Y M with pmh seizure disorder, schizophrenia, adjustment disorder, who presents for follow up.\par \par #epilepsy\par -last siezure 2/20\par -follows Neurologist Dr Britton\par -on Keprra 1250 BID and Depakote 1375 daily\par will order medication level with routine blood work\par \par #schizophrenia\par -follow Psychiatrist- Dr. Masterson. gets weekly risperdone injections\par \par #HCM- counseled on diet and exercise\par -tsh, A1C, LIPID, CBC, CMP, lipid ordered\par rto in 6 months and prn

## 2020-06-29 NOTE — HISTORY OF PRESENT ILLNESS
[Other: _____] : [unfilled] [FreeTextEntry1] : follow up and lab work requested by neurology [de-identified] : Patient from [facility]\par 35 Y M with pmh seizure disorder, schizophrenia, adjustment disorder, who presents for follow up.\par Pt in good health with no medical complaints. \par  Per caregivers, last witnessed seizure at end of January follows neurologist, and Psychiatrist- Dr. Masterson. Currently on depakote 1375mg qd and keppra 1250mg bid. Neurologist -Dr. Britton requesting medication levels.\par Denies smoking, alcohol.

## 2020-06-29 NOTE — REVIEW OF SYSTEMS
[Fever] : no fever [Chills] : no chills [Fatigue] : no fatigue [Vision Problems] : no vision problems [Earache] : no earache [Chest Pain] : no chest pain [Palpitations] : no palpitations [Claudication] : no  leg claudication [Shortness Of Breath] : no shortness of breath [Wheezing] : no wheezing [Cough] : no cough [Abdominal Pain] : no abdominal pain [Nausea] : no nausea [Constipation] : no constipation [Diarrhea] : no diarrhea [Vomiting] : no vomiting [Joint Pain] : no joint pain [Back Pain] : no back pain [Headache] : no headache

## 2020-06-29 NOTE — PHYSICAL EXAM
[No Acute Distress] : no acute distress [Well Nourished] : well nourished [Well Developed] : well developed [Normal Sclera/Conjunctiva] : normal sclera/conjunctiva [No Respiratory Distress] : no respiratory distress  [Normal Outer Ear/Nose] : the outer ears and nose were normal in appearance [No Accessory Muscle Use] : no accessory muscle use [Normal Rate] : normal rate  [Clear to Auscultation] : lungs were clear to auscultation bilaterally [Normal S1, S2] : normal S1 and S2 [Regular Rhythm] : with a regular rhythm [Non Tender] : non-tender [Non-distended] : non-distended [Soft] : abdomen soft [de-identified] : Falt affect [de-identified] : lethargic, seech is slow

## 2020-06-30 ENCOUNTER — APPOINTMENT (OUTPATIENT)
Dept: NEUROLOGY | Facility: CLINIC | Age: 35
End: 2020-06-30
Payer: MEDICAID

## 2020-06-30 DIAGNOSIS — Z86.69 PERSONAL HISTORY OF OTHER DISEASES OF THE NERVOUS SYSTEM AND SENSE ORGANS: ICD-10-CM

## 2020-06-30 PROCEDURE — ZZZZZ: CPT

## 2020-06-30 NOTE — HISTORY OF PRESENT ILLNESS
[Other Location: e.g. Home (Enter Location, City,State)___] : at [unfilled] [Home] : at home, [unfilled] , at the time of the visit. [FreeTextEntry3] : formal caregiver [Verbal consent obtained from patient] : the patient, [unfilled] [Formal Caregiver] : formal caregiver [FreeTextEntry1] : patient with known sz d/o\par follow up today for elevated vpa level of 104\par \par patient is stable and sz free\par compliant with meds\par spoke to caregiver who would like me to speak to psych tomorrow as well as the vpa is also needed from psych standpoint [Time Spent: ___ minutes] : I have spent [unfilled] minutes with the patient on the telephone

## 2020-07-01 LAB
ALBUMIN SERPL ELPH-MCNC: 4.1 G/DL
ALP BLD-CCNC: 28 U/L
ALT SERPL-CCNC: <5 U/L
ANION GAP SERPL CALC-SCNC: 15 MMOL/L
AST SERPL-CCNC: 11 U/L
BILIRUB SERPL-MCNC: 0.4 MG/DL
BUN SERPL-MCNC: 13 MG/DL
CALCIUM SERPL-MCNC: 10.2 MG/DL
CHLORIDE SERPL-SCNC: 100 MMOL/L
CHOLEST SERPL-MCNC: 132 MG/DL
CHOLEST/HDLC SERPL: 2.8 RATIO
CO2 SERPL-SCNC: 26 MMOL/L
CREAT SERPL-MCNC: 1.3 MG/DL
ESTIMATED AVERAGE GLUCOSE: 108 MG/DL
GLUCOSE SERPL-MCNC: 80 MG/DL
HBA1C MFR BLD HPLC: 5.4 %
HDLC SERPL-MCNC: 48 MG/DL
LDLC SERPL CALC-MCNC: 57 MG/DL
POTASSIUM SERPL-SCNC: 4.6 MMOL/L
PROLACTIN SERPL-MCNC: 62.6 NG/ML
PROT SERPL-MCNC: 7.6 G/DL
SODIUM SERPL-SCNC: 141 MMOL/L
TRIGL SERPL-MCNC: 114 MG/DL
TSH SERPL-ACNC: 0.57 UIU/ML
VALPROATE SERPL-MCNC: 104 UG/ML

## 2020-07-06 LAB — LEVETIRACETAM SERPL-MCNC: 47.6 MCG/ML

## 2020-07-13 DIAGNOSIS — F20.9 SCHIZOPHRENIA, UNSPECIFIED: ICD-10-CM

## 2020-07-13 DIAGNOSIS — Z86.69 PERSONAL HISTORY OF OTHER DISEASES OF THE NERVOUS SYSTEM AND SENSE ORGANS: ICD-10-CM

## 2020-07-13 DIAGNOSIS — G40.909 EPILEPSY, UNSPECIFIED, NOT INTRACTABLE, WITHOUT STATUS EPILEPTICUS: ICD-10-CM

## 2020-07-28 ENCOUNTER — EMERGENCY (EMERGENCY)
Facility: HOSPITAL | Age: 35
LOS: 0 days | Discharge: HOME | End: 2020-07-29
Attending: EMERGENCY MEDICINE | Admitting: EMERGENCY MEDICINE
Payer: MEDICAID

## 2020-07-28 VITALS
SYSTOLIC BLOOD PRESSURE: 127 MMHG | RESPIRATION RATE: 17 BRPM | OXYGEN SATURATION: 99 % | HEART RATE: 101 BPM | DIASTOLIC BLOOD PRESSURE: 79 MMHG | TEMPERATURE: 98 F
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
## 2020-07-28 DIAGNOSIS — R56.9 UNSPECIFIED CONVULSIONS: ICD-10-CM

## 2020-07-28 DIAGNOSIS — G40.909 EPILEPSY, UNSPECIFIED, NOT INTRACTABLE, WITHOUT STATUS EPILEPTICUS: ICD-10-CM

## 2020-07-28 DIAGNOSIS — F20.9 SCHIZOPHRENIA, UNSPECIFIED: ICD-10-CM
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
## 2020-07-28 LAB
ALBUMIN SERPL ELPH-MCNC: 4.3 G/DL — SIGNIFICANT CHANGE UP (ref 3.5–5.2)
ALP SERPL-CCNC: 24 U/L — LOW (ref 30–115)
ALT FLD-CCNC: 13 U/L — SIGNIFICANT CHANGE UP (ref 0–41)
ANION GAP SERPL CALC-SCNC: 13 MMOL/L — SIGNIFICANT CHANGE UP (ref 7–14)
APAP SERPL-MCNC: <5 UG/ML — LOW (ref 10–30)
AST SERPL-CCNC: 16 U/L — SIGNIFICANT CHANGE UP (ref 0–41)
BASOPHILS # BLD AUTO: 0.02 K/UL — SIGNIFICANT CHANGE UP (ref 0–0.2)
BASOPHILS NFR BLD AUTO: 0.3 % — SIGNIFICANT CHANGE UP (ref 0–1)
BILIRUB SERPL-MCNC: 0.2 MG/DL — SIGNIFICANT CHANGE UP (ref 0.2–1.2)
BUN SERPL-MCNC: 13 MG/DL — SIGNIFICANT CHANGE UP (ref 10–20)
CALCIUM SERPL-MCNC: 10 MG/DL — SIGNIFICANT CHANGE UP (ref 8.5–10.1)
CHLORIDE SERPL-SCNC: 100 MMOL/L — SIGNIFICANT CHANGE UP (ref 98–110)
CO2 SERPL-SCNC: 26 MMOL/L — SIGNIFICANT CHANGE UP (ref 17–32)
CREAT SERPL-MCNC: 1.1 MG/DL — SIGNIFICANT CHANGE UP (ref 0.7–1.5)
EOSINOPHIL # BLD AUTO: 0.03 K/UL — SIGNIFICANT CHANGE UP (ref 0–0.7)
EOSINOPHIL NFR BLD AUTO: 0.4 % — SIGNIFICANT CHANGE UP (ref 0–8)
ETHANOL SERPL-MCNC: <10 MG/DL — SIGNIFICANT CHANGE UP
GLUCOSE SERPL-MCNC: 105 MG/DL — HIGH (ref 70–99)
HCT VFR BLD CALC: 43.9 % — SIGNIFICANT CHANGE UP (ref 42–52)
HGB BLD-MCNC: 14.9 G/DL — SIGNIFICANT CHANGE UP (ref 14–18)
IMM GRANULOCYTES NFR BLD AUTO: 0.6 % — HIGH (ref 0.1–0.3)
LYMPHOCYTES # BLD AUTO: 1.48 K/UL — SIGNIFICANT CHANGE UP (ref 1.2–3.4)
LYMPHOCYTES # BLD AUTO: 22 % — SIGNIFICANT CHANGE UP (ref 20.5–51.1)
MAGNESIUM SERPL-MCNC: 2 MG/DL — SIGNIFICANT CHANGE UP (ref 1.8–2.4)
MCHC RBC-ENTMCNC: 32.3 PG — HIGH (ref 27–31)
MCHC RBC-ENTMCNC: 33.9 G/DL — SIGNIFICANT CHANGE UP (ref 32–37)
MCV RBC AUTO: 95 FL — HIGH (ref 80–94)
MONOCYTES # BLD AUTO: 0.66 K/UL — HIGH (ref 0.1–0.6)
MONOCYTES NFR BLD AUTO: 9.8 % — HIGH (ref 1.7–9.3)
NEUTROPHILS # BLD AUTO: 4.49 K/UL — SIGNIFICANT CHANGE UP (ref 1.4–6.5)
NEUTROPHILS NFR BLD AUTO: 66.9 % — SIGNIFICANT CHANGE UP (ref 42.2–75.2)
NRBC # BLD: 0 /100 WBCS — SIGNIFICANT CHANGE UP (ref 0–0)
PLATELET # BLD AUTO: 166 K/UL — SIGNIFICANT CHANGE UP (ref 130–400)
POTASSIUM SERPL-MCNC: 4.4 MMOL/L — SIGNIFICANT CHANGE UP (ref 3.5–5)
POTASSIUM SERPL-SCNC: 4.4 MMOL/L — SIGNIFICANT CHANGE UP (ref 3.5–5)
PROT SERPL-MCNC: 7.2 G/DL — SIGNIFICANT CHANGE UP (ref 6–8)
RBC # BLD: 4.62 M/UL — LOW (ref 4.7–6.1)
RBC # FLD: 12 % — SIGNIFICANT CHANGE UP (ref 11.5–14.5)
SALICYLATES SERPL-MCNC: <0.3 MG/DL — LOW (ref 4–30)
SODIUM SERPL-SCNC: 139 MMOL/L — SIGNIFICANT CHANGE UP (ref 135–146)
WBC # BLD: 6.72 K/UL — SIGNIFICANT CHANGE UP (ref 4.8–10.8)
WBC # FLD AUTO: 6.72 K/UL — SIGNIFICANT CHANGE UP (ref 4.8–10.8)

## 2020-07-28 PROCEDURE — 70450 CT HEAD/BRAIN W/O DYE: CPT | Mod: 26

## 2020-07-28 PROCEDURE — 99285 EMERGENCY DEPT VISIT HI MDM: CPT

## 2020-07-28 RX ORDER — LEVETIRACETAM 250 MG/1
1500 TABLET, FILM COATED ORAL ONCE
Refills: 0 | Status: COMPLETED | OUTPATIENT
Start: 2020-07-28 | End: 2020-07-28

## 2020-07-28 RX ORDER — DIVALPROEX SODIUM 500 MG/1
1000 TABLET, DELAYED RELEASE ORAL ONCE
Refills: 0 | Status: COMPLETED | OUTPATIENT
Start: 2020-07-28 | End: 2020-07-28

## 2020-07-28 RX ORDER — SODIUM CHLORIDE 9 MG/ML
1000 INJECTION INTRAMUSCULAR; INTRAVENOUS; SUBCUTANEOUS ONCE
Refills: 0 | Status: COMPLETED | OUTPATIENT
Start: 2020-07-28 | End: 2020-07-28

## 2020-07-28 RX ADMIN — DIVALPROEX SODIUM 1000 MILLIGRAM(S): 500 TABLET, DELAYED RELEASE ORAL at 21:05

## 2020-07-28 RX ADMIN — LEVETIRACETAM 1500 MILLIGRAM(S): 250 TABLET, FILM COATED ORAL at 19:40

## 2020-07-28 RX ADMIN — SODIUM CHLORIDE 1000 MILLILITER(S): 9 INJECTION INTRAMUSCULAR; INTRAVENOUS; SUBCUTANEOUS at 19:42

## 2020-07-28 NOTE — ED ADULT TRIAGE NOTE - CHIEF COMPLAINT QUOTE
[facility]2 at this time. pt fell down as per staff. no head injury noted

## 2020-07-28 NOTE — ED PROVIDER NOTE - OBJECTIVE STATEMENT
35 year old male with pmhx of schizophrenia, and epilepsy presents s/p seizure. pt admits had a seizure earlier today. pt admits has seizures daily. no head injury, neck or back pain, chest pain, sob, abd pain, nausea, vomiting, diarrhea, or fever. no ha or dizziness. pt has been compliant with medications.

## 2020-07-28 NOTE — ED ADULT NURSE NOTE - CHPI ED NUR SYMPTOMS NEG
no dizziness/no fever/no confusion/no numbness/no loss of consciousness/no change in level of consciousness/no blurred vision/no nausea/no weakness/no vomiting

## 2020-07-28 NOTE — ED ADULT NURSE NOTE - CHIEF COMPLAINT QUOTE
[facility] at this time. pt fell down as per staff. no head injury noted

## 2020-07-28 NOTE — ED PROVIDER NOTE - CARE PLAN
Principal Discharge DX:	Seizure Principal Discharge DX:	Seizure  Secondary Diagnosis:	Fall, initial encounter

## 2020-07-28 NOTE — ED PROVIDER NOTE - CLINICAL SUMMARY MEDICAL DECISION MAKING FREE TEXT BOX
35yoM with h/o epilepsy, schizophrenia, presents from [address] [place] [address] with report of seizure. Pt states he had a seizure, states has these daily and would like to go home. Denies head trauma or pain, CP, SOB, abd pain, and all other symptoms. Pt appears frustrated and limited participation with history. Bedside records show compliance with his 1500 Keppra and 1000 depakote though has not yet gotten evening dose. Per facility, had been outside a lot today and was found on the ground seizing. On exam, afebrile, hemodynamically stable, saturating well, NAD, nontoxic appearing, head NCAT, no CTL spine TTP/stepoff/deformity, EOMI, anicteric, MMM, RRR, nml S1/S2, no m/r/g, lungs CTAB, no w/r/r, abd soft, NT, ND, nml BS, no rebound or guarding, AAO, CN's 3-12 intact, motor 5/5 in all extrems, CABRALES spontaneously, no leg cyanosis or edema, no extremity stepoff/deformity/TTP, skin warm, well perfused. Exam entirely atraumatic. Pt AAO and no e/o elevated ICP. No signs of cord compression. No fever or vomiting. Appears well hydrated though have been 2/2 being outside a lot today. Labs unremarkable Noted in EMR review pt admission in February for increasing frequency of seizures, had negative EEG and w/u at that time and discharged. Given 2 evening AEDs. Patient is well appearing, NAD, afebrile, hemodynamically stable. Any available tests and studies were discussed with patient. Discharged with instructions in further symptomatic care, return precautions, and need for neuro f/u.

## 2020-07-28 NOTE — ED ADULT NURSE NOTE - NSIMPLEMENTINTERV_GEN_ALL_ED
Implemented All Fall with Harm Risk Interventions:  [place] to call system. Call bell, personal items and telephone within reach. Instruct patient to call for assistance. Room bathroom lighting operational. Non-slip footwear when patient is off stretcher. Physically safe environment: no spills, clutter or unnecessary equipment. Stretcher in lowest position, wheels locked, appropriate side rails in place. Provide visual cue, wrist band, yellow gown, etc. Monitor gait and stability. Monitor for mental status changes and reorient to person, place, and time. Review medications for side effects contributing to fall risk. Reinforce activity limits and safety measures with patient and family. Provide visual clues: red socks.

## 2020-07-28 NOTE — ED PROVIDER NOTE - NS ED ROS FT
Review of Systems:  	•	CONSTITUTIONAL - no fever, no diaphoresis, no chills  	•	SKIN - no rash  	•	HEMATOLOGIC - no bleeding, no bruising  	•	EYES - no eye pain, no blurry vision  	•	ENT - no change in hearing, no sore throat, no ear pain or tinnitus  	•	RESPIRATORY - no shortness of breath, no cough  	•	CARDIAC - no chest pain, no palpitations  	•	GI - no abd pain, no nausea, no vomiting, no diarrhea, no constipation  	•	GENITO-URINARY - no discharge, no dysuria; no hematuria, no increased urinary frequency  	•	MUSCULOSKELETAL - no joint paint, no swelling, no redness  	•	NEUROLOGIC - + seizure  	•	PSYCH - no anxiety, non suicidal, non homicidal, no hallucination, no depression

## 2020-07-28 NOTE — ED PROVIDER NOTE - PATIENT PORTAL LINK FT
You can access the FollowMyHealth Patient Portal offered by [facility] by registering at the following website: [email or web address]. By joining [employer]’s FollowMyHealth portal, you will also be able to view your health information using other applications (apps) compatible with our system.

## 2020-07-28 NOTE — ED PROVIDER NOTE - PHYSICAL EXAMINATION
CONST: Well appearing in NAD  EYES: PERRL, EOMI, Sclera and conjunctiva clear.   ENT: No nasal discharge. Oropharynx normal appearing, no erythema or exudates. No abscess or swelling. Uvula midline.  NECK: Non-tender, no meningeal signs. normal ROM. supple   CARD: Normal S1 S2; Normal rate and rhythm  RESP: Equal BS B/L, No wheezes, rhonchi or rales. No distress  GI: Soft, non-tender, non-distended. no cva tenderness. normal BS  MS: Normal ROM in all extremities. No midline spinal tenderness. pulses 2 +. no calf tenderness or swelling  SKIN: Warm, dry, no acute rashes. Good turgor  NEURO: A&Ox3, No focal deficits. Strength 5/5 with no sensory deficits. Steady gait. Finger to nose intact. Negative pronator drift

## 2020-07-28 NOTE — ED PROVIDER NOTE - ATTENDING CONTRIBUTION TO CARE
35yoM with h/o epilepsy, schizophrenia, presents from [address] [place] [address] with report of seizure. Pt states he had a seizure, states has these daily and would like to go home. Denies head trauma or pain, CP, SOB, abd pain, and all other symptoms. Pt appears frustrated and limited participation with history. Bedside records show compliance with his 1500 Keppra and 1000 depakote though has not yet gotten evening dose. On exam, afebrile, hemodynamically stable, saturating well, NAD, nontoxic appearing, head NCAT, no CTL spine TTP/stepoff/deformity, EOMI, anicteric, MMM, RRR, nml S1/S2, no m/r/g, lungs CTAB, no w/r/r, abd soft, NT, ND, nml BS, no rebound or guarding, AAO, CN's 3-12 intact, motor 5/5 in all extrems, CABRALES spontaneously, no leg cyanosis or edema, no extremity stepoff/deformity/TTP, skin warm, well perfused. Exam entirely atraumatic. Pt AAO and no e/o elevated ICP. No signs of cord compression. No fever or vomiting. Appears well hydrated. Labs _____ Noted in EMR review pt admission in February for increasing frequency of seizures, had negative EEG and w/u at that time and discharged. Given evening meds. 35yoM with h/o epilepsy, schizophrenia, presents from [address] [place] [address] with report of seizure. Pt states he had a seizure, states has these daily and would like to go home. Denies head trauma or pain, CP, SOB, abd pain, and all other symptoms. Pt appears frustrated and limited participation with history. Bedside records show compliance with his 1500 Keppra and 1000 depakote though has not yet gotten evening dose. Per facility, had been outside a lot today and was found on the ground seizing. On exam, afebrile, hemodynamically stable, saturating well, NAD, nontoxic appearing, head NCAT, no CTL spine TTP/stepoff/deformity, EOMI, anicteric, MMM, RRR, nml S1/S2, no m/r/g, lungs CTAB, no w/r/r, abd soft, NT, ND, nml BS, no rebound or guarding, AAO, CN's 3-12 intact, motor 5/5 in all extrems, CABRALES spontaneously, no leg cyanosis or edema, no extremity stepoff/deformity/TTP, skin warm, well perfused. Exam entirely atraumatic. Pt AAO and no e/o elevated ICP. No signs of cord compression. No fever or vomiting. Appears well hydrated though have been 2/2 being outside a lot today. Labs unremarkable Noted in EMR review pt admission in February for increasing frequency of seizures, had negative EEG and w/u at that time and discharged. Given 2 evening AEDs. Patient is well appearing, NAD, afebrile, hemodynamically stable. Any available tests and studies were discussed with patient. Discharged with instructions in further symptomatic care, return precautions, and need for neuro f/u.

## 2020-07-28 NOTE — ED ADULT NURSE NOTE - OBJECTIVE STATEMENT
patient complaints of having seizure.  Patient fell to ground, no LOC, did not hit head and not on blood thinners.

## 2020-07-30 LAB — LEVETIRACETAM SERPL-MCNC: 9.6 MCG/ML — LOW (ref 12–46)

## 2020-09-03 ENCOUNTER — EMERGENCY (EMERGENCY)
Facility: HOSPITAL | Age: 35
LOS: 0 days | Discharge: HOME | End: 2020-09-03
Attending: EMERGENCY MEDICINE | Admitting: EMERGENCY MEDICINE
Payer: MEDICAID

## 2020-09-03 VITALS
OXYGEN SATURATION: 97 % | TEMPERATURE: 99 F | SYSTOLIC BLOOD PRESSURE: 122 MMHG | HEART RATE: 94 BPM | RESPIRATION RATE: 20 BRPM | DIASTOLIC BLOOD PRESSURE: 72 MMHG

## 2020-09-03 DIAGNOSIS — R56.9 UNSPECIFIED CONVULSIONS: ICD-10-CM

## 2020-09-03 DIAGNOSIS — Z79.899 OTHER LONG TERM (CURRENT) DRUG THERAPY: ICD-10-CM

## 2020-09-03 DIAGNOSIS — G40.909 EPILEPSY, UNSPECIFIED, NOT INTRACTABLE, WITHOUT STATUS EPILEPTICUS: ICD-10-CM

## 2020-09-03 LAB
ALBUMIN SERPL ELPH-MCNC: 4.4 G/DL — SIGNIFICANT CHANGE UP (ref 3.5–5.2)
ALP SERPL-CCNC: 22 U/L — LOW (ref 30–115)
ALT FLD-CCNC: 13 U/L — SIGNIFICANT CHANGE UP (ref 0–41)
ANION GAP SERPL CALC-SCNC: 15 MMOL/L — HIGH (ref 7–14)
AST SERPL-CCNC: 15 U/L — SIGNIFICANT CHANGE UP (ref 0–41)
BASOPHILS # BLD AUTO: 0.01 K/UL — SIGNIFICANT CHANGE UP (ref 0–0.2)
BASOPHILS NFR BLD AUTO: 0.1 % — SIGNIFICANT CHANGE UP (ref 0–1)
BILIRUB SERPL-MCNC: <0.2 MG/DL — SIGNIFICANT CHANGE UP (ref 0.2–1.2)
BUN SERPL-MCNC: 16 MG/DL — SIGNIFICANT CHANGE UP (ref 10–20)
CALCIUM SERPL-MCNC: 9.7 MG/DL — SIGNIFICANT CHANGE UP (ref 8.5–10.1)
CHLORIDE SERPL-SCNC: 104 MMOL/L — SIGNIFICANT CHANGE UP (ref 98–110)
CO2 SERPL-SCNC: 23 MMOL/L — SIGNIFICANT CHANGE UP (ref 17–32)
CREAT SERPL-MCNC: 1.3 MG/DL — SIGNIFICANT CHANGE UP (ref 0.7–1.5)
EOSINOPHIL # BLD AUTO: 0.01 K/UL — SIGNIFICANT CHANGE UP (ref 0–0.7)
EOSINOPHIL NFR BLD AUTO: 0.1 % — SIGNIFICANT CHANGE UP (ref 0–8)
GLUCOSE SERPL-MCNC: 110 MG/DL — HIGH (ref 70–99)
HCT VFR BLD CALC: 45.2 % — SIGNIFICANT CHANGE UP (ref 42–52)
HGB BLD-MCNC: 15.4 G/DL — SIGNIFICANT CHANGE UP (ref 14–18)
IMM GRANULOCYTES NFR BLD AUTO: 0.4 % — HIGH (ref 0.1–0.3)
LYMPHOCYTES # BLD AUTO: 1.08 K/UL — LOW (ref 1.2–3.4)
LYMPHOCYTES # BLD AUTO: 15.9 % — LOW (ref 20.5–51.1)
MAGNESIUM SERPL-MCNC: 1.9 MG/DL — SIGNIFICANT CHANGE UP (ref 1.8–2.4)
MCHC RBC-ENTMCNC: 32.2 PG — HIGH (ref 27–31)
MCHC RBC-ENTMCNC: 34.1 G/DL — SIGNIFICANT CHANGE UP (ref 32–37)
MCV RBC AUTO: 94.6 FL — HIGH (ref 80–94)
MONOCYTES # BLD AUTO: 0.53 K/UL — SIGNIFICANT CHANGE UP (ref 0.1–0.6)
MONOCYTES NFR BLD AUTO: 7.8 % — SIGNIFICANT CHANGE UP (ref 1.7–9.3)
NEUTROPHILS # BLD AUTO: 5.15 K/UL — SIGNIFICANT CHANGE UP (ref 1.4–6.5)
NEUTROPHILS NFR BLD AUTO: 75.7 % — HIGH (ref 42.2–75.2)
NRBC # BLD: 0 /100 WBCS — SIGNIFICANT CHANGE UP (ref 0–0)
PLATELET # BLD AUTO: 156 K/UL — SIGNIFICANT CHANGE UP (ref 130–400)
POTASSIUM SERPL-MCNC: 4.6 MMOL/L — SIGNIFICANT CHANGE UP (ref 3.5–5)
POTASSIUM SERPL-SCNC: 4.6 MMOL/L — SIGNIFICANT CHANGE UP (ref 3.5–5)
PROT SERPL-MCNC: 7.2 G/DL — SIGNIFICANT CHANGE UP (ref 6–8)
RBC # BLD: 4.78 M/UL — SIGNIFICANT CHANGE UP (ref 4.7–6.1)
RBC # FLD: 12.1 % — SIGNIFICANT CHANGE UP (ref 11.5–14.5)
SODIUM SERPL-SCNC: 142 MMOL/L — SIGNIFICANT CHANGE UP (ref 135–146)
VALPROATE SERPL-MCNC: 64 UG/ML — SIGNIFICANT CHANGE UP (ref 50–100)
WBC # BLD: 6.81 K/UL — SIGNIFICANT CHANGE UP (ref 4.8–10.8)
WBC # FLD AUTO: 6.81 K/UL — SIGNIFICANT CHANGE UP (ref 4.8–10.8)

## 2020-09-03 PROCEDURE — 99284 EMERGENCY DEPT VISIT MOD MDM: CPT

## 2020-09-03 PROCEDURE — 93010 ELECTROCARDIOGRAM REPORT: CPT

## 2020-09-03 RX ORDER — MAGNESIUM SULFATE 500 MG/ML
2 VIAL (ML) INJECTION ONCE
Refills: 0 | Status: COMPLETED | OUTPATIENT
Start: 2020-09-03 | End: 2020-09-03

## 2020-09-03 RX ORDER — VALPROIC ACID (AS SODIUM SALT) 250 MG/5ML
1000 SOLUTION, ORAL ORAL ONCE
Refills: 0 | Status: DISCONTINUED | OUTPATIENT
Start: 2020-09-03 | End: 2020-09-03

## 2020-09-03 RX ORDER — DIVALPROEX SODIUM 500 MG/1
1000 TABLET, DELAYED RELEASE ORAL ONCE
Refills: 0 | Status: COMPLETED | OUTPATIENT
Start: 2020-09-03 | End: 2020-09-03

## 2020-09-03 RX ORDER — LEVETIRACETAM 250 MG/1
1000 TABLET, FILM COATED ORAL ONCE
Refills: 0 | Status: COMPLETED | OUTPATIENT
Start: 2020-09-03 | End: 2020-09-03

## 2020-09-03 RX ADMIN — DIVALPROEX SODIUM 1000 MILLIGRAM(S): 500 TABLET, DELAYED RELEASE ORAL at 16:15

## 2020-09-03 RX ADMIN — Medication 50 GRAM(S): at 17:20

## 2020-09-03 RX ADMIN — LEVETIRACETAM 400 MILLIGRAM(S): 250 TABLET, FILM COATED ORAL at 16:13

## 2020-09-03 RX ADMIN — LEVETIRACETAM 1000 MILLIGRAM(S): 250 TABLET, FILM COATED ORAL at 16:30

## 2020-09-03 NOTE — ED PROVIDER NOTE - PHYSICAL EXAMINATION
CONSTITUTIONAL: Well-developed; well-nourished; in no acute distress.   SKIN: warm, dry  HEAD: Normocephalic.  EYES: PERRL, EOMI.  ENT: Airway clear.  NECK: Supple.  LYMPH: No acute cervical adenopathy.  CARD: No murmurs, rubs or gallops. Regular rate and rhythm.   RESP: No wheezing, rales or rhonchi.  ABD: soft ntnd  EXT: No clubbing, cyanosis.   NEURO: Alert, oriented.  PSYCH: Uncooperative, appropriate.

## 2020-09-03 NOTE — ED ADULT NURSE NOTE - OBJECTIVE STATEMENT
[facility] . hx of seizures did not take medications today . patient also found to have marijuana in pocket

## 2020-09-03 NOTE — ED PROVIDER NOTE - PATIENT PORTAL LINK FT
You can access the FollowMyHealth Patient Portal offered by [facility] by registering at the following website: [email or web address]. By joining [employer]’s FollowMyHealth portal, you will also be able to view your health information using other applications (apps) compatible with our system.

## 2020-09-03 NOTE — ED ADULT NURSE NOTE - CHIEF COMPLAINT QUOTE
[place] [facility] [place] [place] . hx of seizures did not take medications today . patient also found to have marijuana in pocket

## 2020-09-03 NOTE — ED PROVIDER NOTE - ATTENDING CONTRIBUTION TO CARE
34 y/o male with hx of schizophrenia, seizure disorder, known to be non compliant with meds, on depakote and keppra, presents to ED from [address] [place] [address] for seizure.  No CP/SOB.  No fever, chills, HA or neck pain.  PE:  agree with above.  A/P:  Seizure.  EKG, labs and meds.

## 2020-09-03 NOTE — ED PROVIDER NOTE - OBJECTIVE STATEMENT
35M with pmh of seizure disorder unknown compliance with medications depakote and keppra and schizophrenia presents for seizure at [address] [place] [address], his place of residence. PT denies fever, chills, biting of tongue, loss of continence, CP, SOB, n/v/d, abd pain, dysuria. PT uncooperative with further questioning.

## 2020-09-03 NOTE — ED ADULT TRIAGE NOTE - CHIEF COMPLAINT QUOTE
[facility] . hx of seizures did not take medications today . patient also found to have marijuana in pocket

## 2020-09-03 NOTE — ED PROVIDER NOTE - PROGRESS NOTE DETAILS
SC: Depakote levels at low-med of normal, so PT given home dose as well as loaded with keppra and mag as mag low. Patient to be discharged from ED. Any available test results were discussed with patient and/or family. Verbal instructions given, including instructions to return to ED immediately for any new, worsening, or concerning symptoms. Patient endorsed understanding. Written discharge instructions additionally given, including follow-up plan.

## 2020-09-03 NOTE — ED PROVIDER NOTE - CLINICAL SUMMARY MEDICAL DECISION MAKING FREE TEXT BOX
Patient at baseline.  Wants to go home.  Levels of magnesium and depakote optimized.  Daily keppra dose given.  D.C home.

## 2020-09-03 NOTE — ED PROVIDER NOTE - NSFOLLOWUPCLINICS_GEN_ALL_ED_FT
Neurosurgery at [facility]  Neurosurgery  [address] [place] [address], [address]  [place] [address]  Phone: [phone]  Fax:   Follow Up Time: 1-3 Days

## 2020-09-03 NOTE — ED ADULT NURSE NOTE - NSIMPLEMENTINTERV_GEN_ALL_ED
Implemented All Fall with Harm Risk Interventions:  [place] to call system. Call bell, personal items and telephone within reach. Instruct patient to call for assistance. Room bathroom lighting operational. Non-slip footwear when patient is off stretcher. Physically safe environment: no spills, clutter or unnecessary equipment. Stretcher in lowest position, wheels locked, appropriate side rails in place. Provide visual cue, wrist band, yellow gown, etc. Monitor gait and stability. Monitor for mental status changes and reorient to person, place, and time. Review medications for side effects contributing to fall risk. Reinforce activity limits and safety measures with patient and family. Provide visual clues: red socks.

## 2020-09-24 ENCOUNTER — EMERGENCY (EMERGENCY)
Facility: HOSPITAL | Age: 35
LOS: 0 days | Discharge: PSYCHIATRIC OR SUBSTANCE USE FACILITY | End: 2020-09-24
Attending: EMERGENCY MEDICINE | Admitting: EMERGENCY MEDICINE
Payer: MEDICAID

## 2020-09-24 VITALS
TEMPERATURE: 98 F | DIASTOLIC BLOOD PRESSURE: 57 MMHG | RESPIRATION RATE: 16 BRPM | OXYGEN SATURATION: 100 % | SYSTOLIC BLOOD PRESSURE: 93 MMHG | HEART RATE: 50 BPM

## 2020-09-24 VITALS
OXYGEN SATURATION: 98 % | WEIGHT: 169.98 LBS | HEART RATE: 96 BPM | SYSTOLIC BLOOD PRESSURE: 98 MMHG | DIASTOLIC BLOOD PRESSURE: 59 MMHG | HEIGHT: 66 IN | RESPIRATION RATE: 16 BRPM
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
## 2020-09-24 DIAGNOSIS — G40.909 EPILEPSY, UNSPECIFIED, NOT INTRACTABLE, WITHOUT STATUS EPILEPTICUS: ICD-10-CM
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
## 2020-09-24 DIAGNOSIS — R56.9 UNSPECIFIED CONVULSIONS: ICD-10-CM

## 2020-09-24 DIAGNOSIS — R53.83 OTHER FATIGUE: ICD-10-CM
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
## 2020-09-24 DIAGNOSIS — S60.519A ABRASION OF UNSPECIFIED HAND, INITIAL ENCOUNTER: ICD-10-CM

## 2020-09-24 LAB
ALBUMIN SERPL ELPH-MCNC: 4.3 G/DL — SIGNIFICANT CHANGE UP (ref 3.5–5.2)
ALP SERPL-CCNC: 22 U/L — LOW (ref 30–115)
ALT FLD-CCNC: 9 U/L — SIGNIFICANT CHANGE UP (ref 0–41)
ANION GAP SERPL CALC-SCNC: 14 MMOL/L — SIGNIFICANT CHANGE UP (ref 7–14)
APAP SERPL-MCNC: <5 UG/ML — LOW (ref 10–30)
AST SERPL-CCNC: 12 U/L — SIGNIFICANT CHANGE UP (ref 0–41)
BASOPHILS # BLD AUTO: 0.02 K/UL — SIGNIFICANT CHANGE UP (ref 0–0.2)
BASOPHILS NFR BLD AUTO: 0.4 % — SIGNIFICANT CHANGE UP (ref 0–1)
BILIRUB SERPL-MCNC: 0.2 MG/DL — SIGNIFICANT CHANGE UP (ref 0.2–1.2)
BUN SERPL-MCNC: 15 MG/DL — SIGNIFICANT CHANGE UP (ref 10–20)
CALCIUM SERPL-MCNC: 9.7 MG/DL — SIGNIFICANT CHANGE UP (ref 8.5–10.1)
CHLORIDE SERPL-SCNC: 106 MMOL/L — SIGNIFICANT CHANGE UP (ref 98–110)
CO2 SERPL-SCNC: 24 MMOL/L — SIGNIFICANT CHANGE UP (ref 17–32)
CREAT SERPL-MCNC: 1.4 MG/DL — SIGNIFICANT CHANGE UP (ref 0.7–1.5)
EOSINOPHIL # BLD AUTO: 0.02 K/UL — SIGNIFICANT CHANGE UP (ref 0–0.7)
EOSINOPHIL NFR BLD AUTO: 0.4 % — SIGNIFICANT CHANGE UP (ref 0–8)
ETHANOL SERPL-MCNC: <10 MG/DL — SIGNIFICANT CHANGE UP
GLUCOSE SERPL-MCNC: 88 MG/DL — SIGNIFICANT CHANGE UP (ref 70–99)
HCT VFR BLD CALC: 45.6 % — SIGNIFICANT CHANGE UP (ref 42–52)
HGB BLD-MCNC: 15.3 G/DL — SIGNIFICANT CHANGE UP (ref 14–18)
IMM GRANULOCYTES NFR BLD AUTO: 1.1 % — HIGH (ref 0.1–0.3)
LACTATE SERPL-SCNC: 6.8 MMOL/L — CRITICAL HIGH (ref 0.7–2)
LYMPHOCYTES # BLD AUTO: 1.34 K/UL — SIGNIFICANT CHANGE UP (ref 1.2–3.4)
LYMPHOCYTES # BLD AUTO: 23.8 % — SIGNIFICANT CHANGE UP (ref 20.5–51.1)
MCHC RBC-ENTMCNC: 31.9 PG — HIGH (ref 27–31)
MCHC RBC-ENTMCNC: 33.6 G/DL — SIGNIFICANT CHANGE UP (ref 32–37)
MCV RBC AUTO: 95.2 FL — HIGH (ref 80–94)
MONOCYTES # BLD AUTO: 0.41 K/UL — SIGNIFICANT CHANGE UP (ref 0.1–0.6)
MONOCYTES NFR BLD AUTO: 7.3 % — SIGNIFICANT CHANGE UP (ref 1.7–9.3)
NEUTROPHILS # BLD AUTO: 3.77 K/UL — SIGNIFICANT CHANGE UP (ref 1.4–6.5)
NEUTROPHILS NFR BLD AUTO: 67 % — SIGNIFICANT CHANGE UP (ref 42.2–75.2)
NRBC # BLD: 0 /100 WBCS — SIGNIFICANT CHANGE UP (ref 0–0)
PLATELET # BLD AUTO: 144 K/UL — SIGNIFICANT CHANGE UP (ref 130–400)
POTASSIUM SERPL-MCNC: 4.4 MMOL/L — SIGNIFICANT CHANGE UP (ref 3.5–5)
POTASSIUM SERPL-SCNC: 4.4 MMOL/L — SIGNIFICANT CHANGE UP (ref 3.5–5)
PROT SERPL-MCNC: 6.7 G/DL — SIGNIFICANT CHANGE UP (ref 6–8)
RBC # BLD: 4.79 M/UL — SIGNIFICANT CHANGE UP (ref 4.7–6.1)
RBC # FLD: 11.9 % — SIGNIFICANT CHANGE UP (ref 11.5–14.5)
SALICYLATES SERPL-MCNC: <0.3 MG/DL — LOW (ref 4–30)
SODIUM SERPL-SCNC: 144 MMOL/L — SIGNIFICANT CHANGE UP (ref 135–146)
VALPROATE SERPL-MCNC: 55 UG/ML — SIGNIFICANT CHANGE UP (ref 50–100)
WBC # BLD: 5.62 K/UL — SIGNIFICANT CHANGE UP (ref 4.8–10.8)
WBC # FLD AUTO: 5.62 K/UL — SIGNIFICANT CHANGE UP (ref 4.8–10.8)

## 2020-09-24 PROCEDURE — 93010 ELECTROCARDIOGRAM REPORT: CPT

## 2020-09-24 PROCEDURE — 99285 EMERGENCY DEPT VISIT HI MDM: CPT

## 2020-09-24 PROCEDURE — 99283 EMERGENCY DEPT VISIT LOW MDM: CPT

## 2020-09-24 RX ORDER — SODIUM CHLORIDE 9 MG/ML
1000 INJECTION, SOLUTION INTRAVENOUS ONCE
Refills: 0 | Status: COMPLETED | OUTPATIENT
Start: 2020-09-24 | End: 2020-09-24

## 2020-09-24 RX ORDER — LEVETIRACETAM 250 MG/1
1000 TABLET, FILM COATED ORAL ONCE
Refills: 0 | Status: COMPLETED | OUTPATIENT
Start: 2020-09-24 | End: 2020-09-24

## 2020-09-24 RX ORDER — LEVETIRACETAM 250 MG/1
1500 TABLET, FILM COATED ORAL ONCE
Refills: 0 | Status: COMPLETED | OUTPATIENT
Start: 2020-09-24 | End: 2020-09-24

## 2020-09-24 RX ADMIN — LEVETIRACETAM 1500 MILLIGRAM(S): 250 TABLET, FILM COATED ORAL at 11:52

## 2020-09-24 RX ADMIN — SODIUM CHLORIDE 1000 MILLILITER(S): 9 INJECTION, SOLUTION INTRAVENOUS at 09:51

## 2020-09-24 NOTE — ED PROVIDER NOTE - NS ED ROS FT
Constitutional:  see HPI  Head:  LOC  Eyes:  no visual changes; no eye pain, redness, or discharge  ENMT:  no ear pain or discharge; no hearing problems; no mouth or throat sores or lesions; no throat pain  Cardiac: no chest pain, tachycardia or palpitations  Respiratory: no cough, wheezing, shortness of breath, chest tightness, or trouble breathing  GI: no nausea, vomiting, diarrhea or abdominal pain  :  no dysuria, frequency, or burning with urination; no change in urine output  MS: no myalgias, muscle weakness, joint pain,or  injury; no joint swelling  Neuro: no weakness; no numbness or tingling; no seizure  Skin:  no rashes or color changes; no lacerations or abrasions Constitutional:  see HPI  Head:  LOC  Eyes:  no visual changes; no eye pain, redness, or discharge  ENMT:  no ear pain or discharge; no hearing problems; no mouth or throat sores or lesions; no throat pain  Cardiac: no chest pain, tachycardia or palpitations  Respiratory: no cough, wheezing, shortness of breath, chest tightness, or trouble breathing  GI: no nausea, vomiting, diarrhea or abdominal pain  :  no dysuria, frequency, or burning with urination; no change in urine output  MS: no myalgias, muscle weakness, joint pain,or  injury; no joint swelling  Neuro: seizure  Skin:  abrasions

## 2020-09-24 NOTE — ED PROVIDER NOTE - PATIENT PORTAL LINK FT
You can access the FollowMyHealth Patient Portal offered by [facility] by registering at the following website: [email or web address]. By joining [employer]’s FollowMyHealth portal, you will also be able to view your health information using other applications (apps) compatible with our system.

## 2020-09-24 NOTE — ED PROVIDER NOTE - PROGRESS NOTE DETAILS
PT CLINICALLY IMPROVED. PT AWAKE AND ALERT. PT EATING A MEAL. PT WITH NO COMPLAINTS. DR. SOSA AT PTS BEDSIDE. neuro OK to d/c. d/w mental health support group [facility] , has a hx of smoking K2, that may have triggered seizure. plan for d/c and instructed pt to f/u with neurologist and education importance of compliance with seizure medications. pt NAD, no neuro deficits.

## 2020-09-24 NOTE — ED PROVIDER NOTE - PHYSICAL EXAMINATION
CONSTITUTIONAL: Well-developed; well-nourished; in no acute distress.   SKIN: abrasions on knuckles  HEAD: Normocephalic; atraumatic.  EYES: PERRL, EOMI, normal sclera and conjunctiva   ENT: No nasal discharge; airway clear.  NECK: Supple; non tender.  CARD: S1, S2 normal; no murmurs, gallops, or rubs. Regular rate and rhythm.   RESP: No wheezes, rales or rhonchi.  ABD: soft ntnd  EXT: Normal ROM.  No clubbing, cyanosis or edema.   LYMPH: No acute cervical adenopathy.  NEURO: lethargic, moving all extremities. normal strength and sensation b/l.  PSYCH: lethargic

## 2020-09-24 NOTE — CONSULT NOTE ADULT - SUBJECTIVE AND OBJECTIVE BOX
Neurology Consult    Patient is a 35y old  Male who presents with a chief complaint of seizures.    HPI:  Patient is a 36 yo male pmh seizure d/o with a history of noncompliance with seizure medications, hepatitis C (viral load unknown), and pph schizophrenia, resides at [facility], was brought in by EMS after being found naked in the street, having urinated on self. Patient recieved 1500 mg keppra in ED. Patient states that he takes all of his medications as prescribed and has a strong desire to return to residence. Patient denies feeling headaches, dizziness or pain. Patient guarded on exam and gave forth minimal effort on exam and demonstrated concrete thinking.     PAST MEDICAL & SURGICAL HISTORY:  Hep C w/o coma, chronic  Schizophrenia  Seizure    No significant past surgical history    FAMILY HISTORY:  Family history of essential hypertension (Mother)    Social History: Patient is a resident at [facility]    Allergies: Allergy Status Unknown    Intolerances    MEDICATIONS  (STANDING):  Home Medications:  clonazePAM 0.5 mg oral tablet: 1 tab(s) orally every 12 hours, As needed, anxiety (05 Mar 2020 10:21)  RisperDAL Consta 37.5 mg/2 weeks intramuscular injection, extended release:  (05 Mar 2020 10:21)  Vitamin D3 1000 intl units (25 mcg) oral tablet: 1 tab(s) orally once a day (05 Mar 2020 10:21)  Valproic acid 500 mg BID  Keppra 750 mg BID    Review of systems:    Constitutional: as per HPI  Eyes: No eye pain or discharge  ENMT:  No difficulty hearing; No sinus or throat pain  Neck: No pain or stiffness  Respiratory: No cough, wheezing, chills or hemoptysis  Cardiovascular: No chest pain, palpitations, shortness of breath  Gastrointestinal: No abdominal pain, nausea, vomiting or hematemesis; no diarrhea or constipation.   Neurological: As per HPI  Skin: No rashes or lesions   Endocrine: No heat or cold intolerance  Musculoskeletal: No joint pain or swelling  Psychiatric: No depression, anxiety, mood swings  Heme/Lymph: No easy bruising or bleeding gums    Vital Signs Last 24 Hrs  T(C): --  T(F): --  HR: 96 (24 Sep 2020 08:38) (96 - 96)  BP: 98/59 (24 Sep 2020 08:38) (98/59 - 98/59)  BP(mean): --  RR: 16 (24 Sep 2020 08:38) (16 - 16)  SpO2: 98% (24 Sep 2020 08:38) (98% - 98%)    Examination:  General:  Appearance is consistent with chronologic age.  No abnormal facies.  Gross skin survey within normal limits.    Cognitive/Language:  The patient is oriented to person, place, time and date. Stated age was 31 years though age is 35. Language with normal repetition, comprehension and naming.    Eyes: intact VA, VFF.  EOMI w/o nystagmus, skew or reported double vision.  PERRL.  No ptosis/weakness of eyelid closure.    Face:  Facial sensation normal V1 - 3, no facial asymmetry.    Ears/Nose/Throat:  Hearing grossly intact b/l.  Palate elevates midline.  Tongue and uvula midline.   Motor examination:   Normal tone, bulk and range of motion.  No tenderness, twitching, tremors or involuntary movements.  Formal Muscle Strength Testin/5 UE, may be related to poor effort; 5/5 LE.  No observable drift.  Reflexes:   2+ b/l pectoralis, biceps, triceps, brachioradialis, patella and Achilles.  Plantar response downgoing b/l.    Sensory examination:   Intact to light touch and pinprick, pain, temperature and proprioception and vibration in all extremities.  Cerebellum:   FTN/HKS intact with normal KALIE in all limbs.  No dysmetria or dysdiadokinesia.  Gait narrow based and normal.    Respiratory:  no audible wheezing or inspiratory stridor.  No use of accessory muscles.   Cardiac: pulse palpable, no audible bruits  Abdomen: supple, no guarding, no TTP    Labs:   CBC Full  -  ( 24 Sep 2020 08:42 )  WBC Count : 5.62 K/uL  RBC Count : 4.79 M/uL  Hemoglobin : 15.3 g/dL  Hematocrit : 45.6 %  Platelet Count - Automated : 144 K/uL  Mean Cell Volume : 95.2 fL  Mean Cell Hemoglobin : 31.9 pg  Mean Cell Hemoglobin Concentration : 33.6 g/dL  Auto Neutrophil # : 3.77 K/uL  Auto Lymphocyte # : 1.34 K/uL  Auto Monocyte # : 0.41 K/uL  Auto Eosinophil # : 0.02 K/uL  Auto Basophil # : 0.02 K/uL  Auto Neutrophil % : 67.0 %  Auto Lymphocyte % : 23.8 %  Auto Monocyte % : 7.3 %  Auto Eosinophil % : 0.4 %  Auto Basophil % : 0.4 %        144  |  106  |  15  ----------------------------<  88  4.4   |  24  |  1.4    Ca    9.7      24 Sep 2020 08:42    TPro  6.7  /  Alb  4.3  /  TBili  0.2  /  DBili  x   /  AST  12  /  ALT  9   /  AlkPhos  22<L>      LIVER FUNCTIONS - ( 24 Sep 2020 08:42 )  Alb: 4.3 g/dL / Pro: 6.7 g/dL / ALK PHOS: 22 U/L / ALT: 9 U/L / AST: 12 U/L / GGT: x           DANE 55       Neurology Consult    Patient is a 35y old  Male who presents with a chief complaint of seizures.    HPI:  Patient is a 34 yo male pmh seizure d/o with a history of noncompliance with seizure medications, hepatitis C (viral load unknown), and pph schizophrenia, resides at [facility], was brought in by EMS after being found naked in the street, having urinated on self. Patient recieved 1500 mg keppra in ED. Patient states that he takes all of his medications as prescribed and has a strong desire to return to residence. Patient denies feeling headaches, dizziness or pain. Patient guarded on exam and gave forth minimal effort on exam and demonstrated concrete thinking.     Collateral Information obtained from patient's residence: [place], Nurse on Unit, [phone].  Patient seen by other residents of the [facility] shaking before EMS activated. Other staff members did not witness seizure. Patient has a history of recreational drug use. He has been compliant with medications lately.  Patient currently taking the following medications:  keppra 1500 mg BID  depakote 1000 mg BID  Recently had a decrease to both medications due to high blood levels. Patient has been more alert since the decrease.  Baseline: guarded at baseline    PAST MEDICAL & SURGICAL HISTORY:  Hep C w/o coma, chronic  Schizophrenia  Seizure    No significant past surgical history    FAMILY HISTORY:  Family history of essential hypertension (Mother)    Social History: Patient is a resident at [place] [address]    Allergies: Allergy Status Unknown    Intolerances    MEDICATIONS  (STANDING):  Home Medications:  clonazePAM 0.5 mg oral tablet: 1 tab(s) orally every 12 hours, As needed, anxiety (05 Mar 2020 10:21)  RisperDAL Consta 37.5 mg/2 weeks intramuscular injection, extended release:  (05 Mar 2020 10:21)  Vitamin D3 1000 intl units (25 mcg) oral tablet: 1 tab(s) orally once a day (05 Mar 2020 10:21)  Valproic acid 500 mg BID  Keppra 750 mg BID    Review of systems:    Constitutional: as per HPI  Eyes: No eye pain or discharge  ENMT:  No difficulty hearing; No sinus or throat pain  Neck: No pain or stiffness  Respiratory: No cough, wheezing, chills or hemoptysis  Cardiovascular: No chest pain, palpitations, shortness of breath  Gastrointestinal: No abdominal pain, nausea, vomiting or hematemesis; no diarrhea or constipation.   Neurological: As per HPI  Skin: No rashes or lesions   Endocrine: No heat or cold intolerance  Musculoskeletal: No joint pain or swelling  Psychiatric: No depression, anxiety, mood swings  Heme/Lymph: No easy bruising or bleeding gums    Vital Signs Last 24 Hrs  T(C): --  T(F): --  HR: 96 (24 Sep 2020 08:38) (96 - 96)  BP: 98/59 (24 Sep 2020 08:38) (98/59 - 98/59)  BP(mean): --  RR: 16 (24 Sep 2020 08:38) (16 - 16)  SpO2: 98% (24 Sep 2020 08:38) (98% - 98%)    Examination:  General:  Appearance is consistent with chronologic age.  No abnormal facies.  Gross skin survey within normal limits.    Cognitive/Language:  The patient is oriented to person, place, time and date. Stated age was 31 years though age is 35. Language with normal repetition, comprehension and naming.    Eyes: intact VA, VFF.  EOMI w/o nystagmus, skew or reported double vision.  PERRL.  No ptosis/weakness of eyelid closure.    Face:  Facial sensation normal V1 - 3, no facial asymmetry.    Ears/Nose/Throat:  Hearing grossly intact b/l.  Palate elevates midline.  Tongue and uvula midline.   Motor examination:   Normal tone, bulk and range of motion.  No tenderness, twitching, tremors or involuntary movements.  Formal Muscle Strength Testin/5 UE, may be related to poor effort; 5/5 LE.  No observable drift.  Reflexes:   2+ b/l pectoralis, biceps, triceps, brachioradialis, patella and Achilles.  Plantar response downgoing b/l.    Sensory examination:   Intact to light touch and pinprick, pain, temperature and proprioception and vibration in all extremities.  Cerebellum:   FTN/HKS intact with normal KALIE in all limbs.  No dysmetria or dysdiadokinesia.  Gait narrow based and normal.    Respiratory:  no audible wheezing or inspiratory stridor.  No use of accessory muscles.   Cardiac: pulse palpable, no audible bruits  Abdomen: supple, no guarding, no TTP    Labs:   CBC Full  -  ( 24 Sep 2020 08:42 )  WBC Count : 5.62 K/uL  RBC Count : 4.79 M/uL  Hemoglobin : 15.3 g/dL  Hematocrit : 45.6 %  Platelet Count - Automated : 144 K/uL  Mean Cell Volume : 95.2 fL  Mean Cell Hemoglobin : 31.9 pg  Mean Cell Hemoglobin Concentration : 33.6 g/dL  Auto Neutrophil # : 3.77 K/uL  Auto Lymphocyte # : 1.34 K/uL  Auto Monocyte # : 0.41 K/uL  Auto Eosinophil # : 0.02 K/uL  Auto Basophil # : 0.02 K/uL  Auto Neutrophil % : 67.0 %  Auto Lymphocyte % : 23.8 %  Auto Monocyte % : 7.3 %  Auto Eosinophil % : 0.4 %  Auto Basophil % : 0.4 %        144  |  106  |  15  ----------------------------<  88  4.4   |  24  |  1.4    Ca    9.7      24 Sep 2020 08:42    TPro  6.7  /  Alb  4.3  /  TBili  0.2  /  DBili  x   /  AST  12  /  ALT  9   /  AlkPhos  22<L>      LIVER FUNCTIONS - ( 24 Sep 2020 08:42 )  Alb: 4.3 g/dL / Pro: 6.7 g/dL / ALK PHOS: 22 U/L / ALT: 9 U/L / AST: 12 U/L / GGT: x           DANE 55       Neurology Consult    Patient is a 35y old  Male who presents with a chief complaint of seizures.    HPI:  Patient is a 34 yo male pmh seizure d/o with a history of noncompliance with seizure medications, hepatitis C (viral load unknown), and pph schizophrenia, resides at [facility], was brought in by EMS after being found naked in the street, having urinated on self. Patient recieved 1500 mg keppra in ED. Patient states that he takes all of his medications as prescribed and has a strong desire to return to residence. Patient denies feeling headaches, dizziness or pain. Patient guarded on exam and gave forth minimal effort on exam and demonstrated concrete thinking. Collateral Information obtained from patient's residence: [place], Nurse on Unit, [phone].  Patient seen by other residents of the [facility] shaking before EMS activated. Other staff members did not witness seizure. Patient has a history of recreational drug use. He has been compliant with medications lately.  Patient currently taking the following medications:  keppra 1500 mg BID  depakote 1000 mg BID  Recently had a decrease to both medications due to high blood levels. Patient has been more alert since the decrease.  Baseline: guarded at baseline    PAST MEDICAL & SURGICAL HISTORY:  Hep C w/o coma, chronic  Schizophrenia  Seizure    No significant past surgical history    FAMILY HISTORY:  Family history of essential hypertension (Mother)    Social History: Patient is a resident at [place] [address]    Allergies: Allergy Status Unknown    Intolerances    MEDICATIONS  (STANDING):  Home Medications:  clonazePAM 0.5 mg oral tablet: 1 tab(s) orally every 12 hours, As needed, anxiety (05 Mar 2020 10:21)  RisperDAL Consta 37.5 mg/2 weeks intramuscular injection, extended release:  (05 Mar 2020 10:21)  Vitamin D3 1000 intl units (25 mcg) oral tablet: 1 tab(s) orally once a day (05 Mar 2020 10:21)  Valproic acid 500 mg BID  Keppra 750 mg BID    Review of systems:    Constitutional: as per HPI  Eyes: No eye pain or discharge  ENMT:  No difficulty hearing; No sinus or throat pain  Neck: No pain or stiffness  Respiratory: No cough, wheezing, chills or hemoptysis  Cardiovascular: No chest pain, palpitations, shortness of breath  Gastrointestinal: No abdominal pain, nausea, vomiting or hematemesis; no diarrhea or constipation.   Neurological: As per HPI  Skin: No rashes or lesions   Endocrine: No heat or cold intolerance  Musculoskeletal: No joint pain or swelling  Psychiatric: No depression, anxiety, mood swings  Heme/Lymph: No easy bruising or bleeding gums    Vital Signs Last 24 Hrs  T(C): --  T(F): --  HR: 96 (24 Sep 2020 08:38) (96 - 96)  BP: 98/59 (24 Sep 2020 08:38) (98/59 - 98/59)  BP(mean): --  RR: 16 (24 Sep 2020 08:38) (16 - 16)  SpO2: 98% (24 Sep 2020 08:38) (98% - 98%)    Examination:  General:  Appearance is consistent with chronologic age.  No abnormal facies.  Gross skin survey within normal limits.    Cognitive/Language:  The patient is oriented to person, place, time and date. Stated age was 31 years though age is 35. Language with normal repetition, comprehension and naming.    Eyes: intact VA, VFF.  EOMI w/o nystagmus, skew or reported double vision.  PERRL.  No ptosis/weakness of eyelid closure.    Face:  Facial sensation normal V1 - 3, no facial asymmetry.    Ears/Nose/Throat:  Hearing grossly intact b/l.  Palate elevates midline.  Tongue and uvula midline.   Motor examination:   Normal tone, bulk and range of motion.  No tenderness, twitching, tremors or involuntary movements.  Formal Muscle Strength Testin/5 UE, may be related to poor effort; 5/5 LE.  No observable drift.  Reflexes:   2+ b/l pectoralis, biceps, triceps, brachioradialis, patella and Achilles.  Plantar response downgoing b/l.    Sensory examination:   Intact to light touch and pinprick, pain, temperature and proprioception and vibration in all extremities.  Cerebellum:   FTN/HKS intact with normal KALIE in all limbs.  No dysmetria or dysdiadokinesia.  Gait narrow based and normal.    Respiratory:  no audible wheezing or inspiratory stridor.  No use of accessory muscles.   Cardiac: pulse palpable, no audible bruits  Abdomen: supple, no guarding, no TTP    Labs:   CBC Full  -  ( 24 Sep 2020 08:42 )  WBC Count : 5.62 K/uL  RBC Count : 4.79 M/uL  Hemoglobin : 15.3 g/dL  Hematocrit : 45.6 %  Platelet Count - Automated : 144 K/uL  Mean Cell Volume : 95.2 fL  Mean Cell Hemoglobin : 31.9 pg  Mean Cell Hemoglobin Concentration : 33.6 g/dL  Auto Neutrophil # : 3.77 K/uL  Auto Lymphocyte # : 1.34 K/uL  Auto Monocyte # : 0.41 K/uL  Auto Eosinophil # : 0.02 K/uL  Auto Basophil # : 0.02 K/uL  Auto Neutrophil % : 67.0 %  Auto Lymphocyte % : 23.8 %  Auto Monocyte % : 7.3 %  Auto Eosinophil % : 0.4 %  Auto Basophil % : 0.4 %        144  |  106  |  15  ----------------------------<  88  4.4   |  24  |  1.4    Ca    9.7      24 Sep 2020 08:42    TPro  6.7  /  Alb  4.3  /  TBili  0.2  /  DBili  x   /  AST  12  /  ALT  9   /  AlkPhos  22<L>      LIVER FUNCTIONS - ( 24 Sep 2020 08:42 )  Alb: 4.3 g/dL / Pro: 6.7 g/dL / ALK PHOS: 22 U/L / ALT: 9 U/L / AST: 12 U/L / GGT: x           DANE 55       Neurology Consult    Patient is a 35y old  Male who presents with a chief complaint of seizures.    HPI:  Patient is a 34 yo male pmh seizure d/o with a history of noncompliance with seizure medications, hepatitis C (viral load unknown), and pph schizophrenia, resides at [facility], was brought in by EMS after being found naked in the street, having urinated on self. Patient received 1500 mg keppra in ED. Patient states that he takes all of his medications as prescribed and has a strong desire to return to residence. Patient denies feeling headaches, dizziness or pain. Patient guarded on exam and gave forth minimal effort on exam and demonstrated concrete thinking.     Collateral Information obtained from patient's residence: [place], Nurse on Unit, [phone].  Patient seen by other residents of the [facility] shaking before EMS activated. Other staff members did not witness seizure. He has been compliant with medications lately. He had a recent decrease to both medications due to elevated blood levels. Of note, at the previous dosing levels, the patient had break through seizures. Since the decrease in medication, the patient has been more alert. At baseline, patient is guarded.  Patient currently taking the following medications:  keppra 1500 mg BID  depakote 1000 mg BID      PAST MEDICAL & SURGICAL HISTORY:  Hep C w/o coma, chronic  Schizophrenia  Seizure    No significant past surgical history    FAMILY HISTORY:  Family history of essential hypertension (Mother)    Social History: Patient is a resident at [facility]    Allergies: Allergy Status Unknown    Intolerances    MEDICATIONS  (STANDING):  Home Medications:  clonazePAM 0.5 mg oral tablet: 1 tab(s) orally every 12 hours, As needed, anxiety (05 Mar 2020 10:21)  RisperDAL Consta 37.5 mg/2 weeks intramuscular injection, extended release:  (05 Mar 2020 10:21)  Vitamin D3 1000 intl units (25 mcg) oral tablet: 1 tab(s) orally once a day (05 Mar 2020 10:21)  Valproic acid 500 mg BID  Keppra 750 mg BID    Review of systems:    Constitutional: as per HPI  Eyes: No eye pain or discharge  ENMT:  No difficulty hearing; No sinus or throat pain  Neck: No pain or stiffness  Respiratory: No cough, wheezing, chills or hemoptysis  Cardiovascular: No chest pain, palpitations, shortness of breath  Gastrointestinal: No abdominal pain, nausea, vomiting or hematemesis; no diarrhea or constipation.   Neurological: As per HPI  Skin: No rashes or lesions   Endocrine: No heat or cold intolerance  Musculoskeletal: No joint pain or swelling  Psychiatric: No depression, anxiety, mood swings  Heme/Lymph: No easy bruising or bleeding gums    Vital Signs Last 24 Hrs  T(C): --  T(F): --  HR: 96 (24 Sep 2020 08:38) (96 - 96)  BP: 98/59 (24 Sep 2020 08:38) (98/59 - 98/59)  BP(mean): --  RR: 16 (24 Sep 2020 08:38) (16 - 16)  SpO2: 98% (24 Sep 2020 08:38) (98% - 98%)    Examination:  General:  Appearance is consistent with chronologic age.  No abnormal facies.  Gross skin survey within normal limits.    Cognitive/Language:  The patient is oriented to person, place, time and date. Stated age was 31 years though age is 35. Language with normal repetition, comprehension and naming.    Eyes: intact VA, VFF.  EOMI w/o nystagmus, skew or reported double vision.  PERRL.  No ptosis/weakness of eyelid closure.    Face:  Facial sensation normal V1 - 3, no facial asymmetry.    Ears/Nose/Throat:  Hearing grossly intact b/l.  Palate elevates midline.  Tongue and uvula midline.   Motor examination:   Normal tone, bulk and range of motion.  No tenderness, twitching, tremors or involuntary movements.  Formal Muscle Strength Testin/5 UE, may be related to poor effort; 5/5 LE.  No observable drift.  Reflexes:   2+ b/l pectoralis, biceps, triceps, brachioradialis, patella and Achilles.  Plantar response downgoing b/l.    Sensory examination:   Intact to light touch and pinprick, pain, temperature and proprioception and vibration in all extremities.  Cerebellum:   FTN/HKS intact with normal KALIE in all limbs.  No dysmetria or dysdiadokinesia.  Gait narrow based and normal.    Respiratory:  no audible wheezing or inspiratory stridor.  No use of accessory muscles.   Cardiac: pulse palpable, no audible bruits  Abdomen: supple, no guarding, no TTP    Labs:   CBC Full  -  ( 24 Sep 2020 08:42 )  WBC Count : 5.62 K/uL  RBC Count : 4.79 M/uL  Hemoglobin : 15.3 g/dL  Hematocrit : 45.6 %  Platelet Count - Automated : 144 K/uL  Mean Cell Volume : 95.2 fL  Mean Cell Hemoglobin : 31.9 pg  Mean Cell Hemoglobin Concentration : 33.6 g/dL  Auto Neutrophil # : 3.77 K/uL  Auto Lymphocyte # : 1.34 K/uL  Auto Monocyte # : 0.41 K/uL  Auto Eosinophil # : 0.02 K/uL  Auto Basophil # : 0.02 K/uL  Auto Neutrophil % : 67.0 %  Auto Lymphocyte % : 23.8 %  Auto Monocyte % : 7.3 %  Auto Eosinophil % : 0.4 %  Auto Basophil % : 0.4 %        144  |  106  |  15  ----------------------------<  88  4.4   |  24  |  1.4    Ca    9.7      24 Sep 2020 08:42    TPro  6.7  /  Alb  4.3  /  TBili  0.2  /  DBili  x   /  AST  12  /  ALT  9   /  AlkPhos  22<L>      LIVER FUNCTIONS - ( 24 Sep 2020 08:42 )  Alb: 4.3 g/dL / Pro: 6.7 g/dL / ALK PHOS: 22 U/L / ALT: 9 U/L / AST: 12 U/L / GGT: x           DANE 55

## 2020-09-24 NOTE — ED PROVIDER NOTE - OBJECTIVE STATEMENT
34 yo male hx of seizures on keppra and depakote, schizophrenia presenting after being found naked in a parking lot of [address] [place] [address] ~20 mins PTA. As per EMS, pt found lethargic but able to verbalize name. In ED, denies pain, head injury, cp, sob, weakness, numbness, tingling. Pt reports compliance with medications but has frequent presentation to ED for seizures secondary to noncompliance. 34 yo male hx of seizures on keppra and depakote, schizophrenia presenting after being found naked in a parking lot of [address] [place] [address] ~20 mins PTA. As per EMS, pt found lethargic and self-urinated but able to verbalize name. In ED, denies pain, head injury, cp, sob, weakness, numbness, tingling. Pt reports compliance with medications but has frequent presentation to ED for seizures secondary to noncompliance.

## 2020-09-24 NOTE — ED PROVIDER NOTE - ATTENDING CONTRIBUTION TO CARE
36 y/o male h/o Hep C, schizophrenia, sz d/o (states compliant with keppra and depakote), now BIBEMS with AMS / suspected sz, found outdoors on ground PT incontinent of urine, sx are persistent, pt denies complaints at present, hx limited since patient not responding to most questions at present. The pt has been see several times recently for sz / suspected med non-compliance.    Old chart reviewed.  I have reviewed and agree with the nursing note, except as documented in my note.    VSS, awake, alert, lying comfortably in stretcher, in NAD, oropharynx clear, mmm, no skin rash or lesions, chest CTAB, non-labored breathing, no w/r/r, +S1/S2, RRR, no m/r/g, abdomen soft, NT, ND, +BS, no peripheral edema or deformities, alert, eye contact, facial expression normal, appropriate mood and depressed affect, slow and slightly slurred speech, no signs trauma, infection, cerebrovascular event.

## 2020-09-24 NOTE — ED PROVIDER NOTE - CLINICAL SUMMARY MEDICAL DECISION MAKING FREE TEXT BOX
36 Y/O M KNOWN NONCOMPLIANCE WITH AEDS PRESENTS AFTER SEIZURE. ALL DIAGNOSTIC TESTING REVIEWED. PT EVALUATED BY NEUROLOGY. PT DISCHARGED TO [place].

## 2020-09-24 NOTE — ED ADULT TRIAGE NOTE - CHIEF COMPLAINT QUOTE
patient found outside naked urinated on self hx of seizures denies substance use. reports taking seizure medications as prescribed. patient sleepy but rousible in triage

## 2020-09-24 NOTE — CONSULT NOTE ADULT - ASSESSMENT
Patient is a 36 yo male pmh seizure d/o with a history of noncompliance with seizure medications, hepatitis C (viral load unknown), and pph schizophrenia, resides at [facility] building [facility], was brought in by EMS after being found naked in the street, having urinated on self. Patient         Plan:   Patient is a 34 yo male pmh seizure d/o with a history of noncompliance with seizure medications, hepatitis C (viral load unknown), and pph schizophrenia, resides at [facility] building [facility], was brought in by EMS after being found naked in the street, having urinated on self. Patient         Plan:  - Patient is a 34 yo male pmh seizure d/o with a history of noncompliance with seizure medications, hepatitis C (viral load unknown), and pph schizophrenia, resides at [facility] building [facility], was brought in by EMS after being found naked and shaking, having urinated on self. Patient stating he is compliant with his medications and asking to return home. According to collateral information, patient had a recent decrease in medication dosing due to elevated blood levels and since the decrease he has been compliant. On exam, he was guarded and had limited effort on exam. His valproate level is 55 and keppra is pending.     Plan:  - The patient's presentation may be related to a decrease in his seizure control medications.   - Patient should follow up with neurologist, consider change from tablet to liquid form if compliance is a challenge.  - Follow up on keppra levels.

## 2020-09-24 NOTE — CONSULT NOTE ADULT - ATTENDING COMMENTS
Patient examined in ED for breakthrough seizure.  Has valproic acid level of 55.  Hx of noncompliance but apparently taking medication currently. Would recommend checking levetiracetam level for documentation purposes. May continue current anticonvulsant regimen.  Maintain magnesium > 2.0.  Okay for discharge if no additional seizures.  Patient in past on higher doses of anticonvulsants and was drowsy and more noncompliant.

## 2020-09-24 NOTE — ED ADULT NURSE NOTE - OBJECTIVE STATEMENT
Pt brought in by EMS after being found naked outside, pt urinated on self, possible unwitnessed seizure, pt has hx of seizure, pt states he has been taking medication as prescribed. Denies drug/alcohol use. Denies cp, sob, pain, n/v/d, fevers, chills, cough, abd pain, back pain, headaches, urinary symptoms. Pt sleepy but arousible.

## 2020-09-24 NOTE — ED PROVIDER NOTE - NSFOLLOWUPINSTRUCTIONS_ED_ALL_ED_FT
Seizure    A seizure is abnormal electrical activity in the brain. There are various types of seizures with various causes. Prior to the seizure you may experience a warning sensation (aura) that may include fear, nausea, dizziness, and visual changes such as flashing lights of spots. Common symptoms during the seizure may including an altered mental status, rhythm jerking movements, drooling, grunting, loss of bladder or bowel control, tongue biting. After a seizure, you may feel confused and sleepy.     Do now swim, drive, operate machinery, or engage in any risky activity during which a seizure could cause further injury to you or others. Teach friends and family what to do if you have a seizure which includes laying you on the ground which your head on a cushion and turning you to the side to keep the airway clear in case of vomiting.     SEEK IMMEDIATE MEDICAL CARE IF YOU HAVE THE FOLLOWING SYMPTOMS: seizure lasting over 5 minutes, not waking up or altered mental status after the seizure, or more frequent or worsening seizures.

## 2020-09-26 LAB — LEVETIRACETAM SERPL-MCNC: 17.9 MCG/ML — SIGNIFICANT CHANGE UP (ref 12–46)

## 2020-10-12 ENCOUNTER — EMERGENCY (EMERGENCY)
Facility: HOSPITAL | Age: 35
LOS: 0 days | Discharge: HOME | End: 2020-10-13
Attending: EMERGENCY MEDICINE | Admitting: EMERGENCY MEDICINE
Payer: MEDICAID

## 2020-10-12 VITALS
TEMPERATURE: 97 F | RESPIRATION RATE: 18 BRPM | DIASTOLIC BLOOD PRESSURE: 48 MMHG | OXYGEN SATURATION: 98 % | SYSTOLIC BLOOD PRESSURE: 88 MMHG | HEART RATE: 97 BPM | HEIGHT: 66 IN

## 2020-10-12 DIAGNOSIS — G40.909 EPILEPSY, UNSPECIFIED, NOT INTRACTABLE, WITHOUT STATUS EPILEPTICUS: ICD-10-CM

## 2020-10-12 DIAGNOSIS — Z87.891 PERSONAL HISTORY OF NICOTINE DEPENDENCE: ICD-10-CM

## 2020-10-12 DIAGNOSIS — Z00.00 ENCOUNTER FOR GENERAL ADULT MEDICAL EXAMINATION WITHOUT ABNORMAL FINDINGS: ICD-10-CM

## 2020-10-12 DIAGNOSIS — R55 SYNCOPE AND COLLAPSE: ICD-10-CM

## 2020-10-12 LAB
ALBUMIN SERPL ELPH-MCNC: 5 G/DL — SIGNIFICANT CHANGE UP (ref 3.5–5.2)
ALP SERPL-CCNC: 25 U/L — LOW (ref 30–115)
ALT FLD-CCNC: 11 U/L — SIGNIFICANT CHANGE UP (ref 0–41)
ANION GAP SERPL CALC-SCNC: 26 MMOL/L — HIGH (ref 7–14)
AST SERPL-CCNC: 16 U/L — SIGNIFICANT CHANGE UP (ref 0–41)
BASE EXCESS BLDV CALC-SCNC: -9.5 MMOL/L — LOW (ref -2–2)
BASE EXCESS BLDV CALC-SCNC: 1.1 MMOL/L — SIGNIFICANT CHANGE UP (ref -2–2)
BASOPHILS # BLD AUTO: 0.02 K/UL — SIGNIFICANT CHANGE UP (ref 0–0.2)
BASOPHILS NFR BLD AUTO: 0.2 % — SIGNIFICANT CHANGE UP (ref 0–1)
BILIRUB SERPL-MCNC: 0.4 MG/DL — SIGNIFICANT CHANGE UP (ref 0.2–1.2)
BUN SERPL-MCNC: 12 MG/DL — SIGNIFICANT CHANGE UP (ref 10–20)
CA-I SERPL-SCNC: 1.21 MMOL/L — SIGNIFICANT CHANGE UP (ref 1.12–1.3)
CA-I SERPL-SCNC: 1.23 MMOL/L — SIGNIFICANT CHANGE UP (ref 1.12–1.3)
CALCIUM SERPL-MCNC: 10.1 MG/DL — SIGNIFICANT CHANGE UP (ref 8.5–10.1)
CHLORIDE SERPL-SCNC: 98 MMOL/L — SIGNIFICANT CHANGE UP (ref 98–110)
CO2 SERPL-SCNC: 16 MMOL/L — LOW (ref 17–32)
CREAT SERPL-MCNC: 1.6 MG/DL — HIGH (ref 0.7–1.5)
EOSINOPHIL # BLD AUTO: 0.02 K/UL — SIGNIFICANT CHANGE UP (ref 0–0.7)
EOSINOPHIL NFR BLD AUTO: 0.2 % — SIGNIFICANT CHANGE UP (ref 0–8)
GAS PNL BLDV: 138 MMOL/L — SIGNIFICANT CHANGE UP (ref 136–145)
GAS PNL BLDV: 140 MMOL/L — SIGNIFICANT CHANGE UP (ref 136–145)
GAS PNL BLDV: SIGNIFICANT CHANGE UP
GAS PNL BLDV: SIGNIFICANT CHANGE UP
GLUCOSE SERPL-MCNC: 108 MG/DL — HIGH (ref 70–99)
HCO3 BLDV-SCNC: 18 MMOL/L — LOW (ref 22–29)
HCO3 BLDV-SCNC: 27 MMOL/L — SIGNIFICANT CHANGE UP (ref 22–29)
HCT VFR BLD CALC: 50 % — SIGNIFICANT CHANGE UP (ref 42–52)
HCT VFR BLDA CALC: 43.5 % — SIGNIFICANT CHANGE UP (ref 34–44)
HCT VFR BLDA CALC: 48.6 % — HIGH (ref 34–44)
HGB BLD CALC-MCNC: 14.2 G/DL — SIGNIFICANT CHANGE UP (ref 14–18)
HGB BLD CALC-MCNC: 15.9 G/DL — SIGNIFICANT CHANGE UP (ref 14–18)
HGB BLD-MCNC: 16.6 G/DL — SIGNIFICANT CHANGE UP (ref 14–18)
IMM GRANULOCYTES NFR BLD AUTO: 1 % — HIGH (ref 0.1–0.3)
LACTATE BLDV-MCNC: 11.4 MMOL/L — HIGH (ref 0.5–1.6)
LACTATE BLDV-MCNC: 4.2 MMOL/L — HIGH (ref 0.5–1.6)
LYMPHOCYTES # BLD AUTO: 2.4 K/UL — SIGNIFICANT CHANGE UP (ref 1.2–3.4)
LYMPHOCYTES # BLD AUTO: 26.5 % — SIGNIFICANT CHANGE UP (ref 20.5–51.1)
MCHC RBC-ENTMCNC: 31.9 PG — HIGH (ref 27–31)
MCHC RBC-ENTMCNC: 33.2 G/DL — SIGNIFICANT CHANGE UP (ref 32–37)
MCV RBC AUTO: 96.2 FL — HIGH (ref 80–94)
MONOCYTES # BLD AUTO: 0.53 K/UL — SIGNIFICANT CHANGE UP (ref 0.1–0.6)
MONOCYTES NFR BLD AUTO: 5.9 % — SIGNIFICANT CHANGE UP (ref 1.7–9.3)
NEUTROPHILS # BLD AUTO: 5.98 K/UL — SIGNIFICANT CHANGE UP (ref 1.4–6.5)
NEUTROPHILS NFR BLD AUTO: 66.2 % — SIGNIFICANT CHANGE UP (ref 42.2–75.2)
NRBC # BLD: 0 /100 WBCS — SIGNIFICANT CHANGE UP (ref 0–0)
PCO2 BLDV: 46 MMHG — SIGNIFICANT CHANGE UP (ref 41–51)
PCO2 BLDV: 49 MMHG — SIGNIFICANT CHANGE UP (ref 41–51)
PH BLDV: 7.21 — LOW (ref 7.26–7.43)
PH BLDV: 7.36 — SIGNIFICANT CHANGE UP (ref 7.26–7.43)
PLATELET # BLD AUTO: 210 K/UL — SIGNIFICANT CHANGE UP (ref 130–400)
PO2 BLDV: 33 MMHG — SIGNIFICANT CHANGE UP (ref 20–40)
PO2 BLDV: 36 MMHG — SIGNIFICANT CHANGE UP (ref 20–40)
POTASSIUM BLDV-SCNC: 3.9 MMOL/L — SIGNIFICANT CHANGE UP (ref 3.3–5.6)
POTASSIUM BLDV-SCNC: 4.1 MMOL/L — SIGNIFICANT CHANGE UP (ref 3.3–5.6)
POTASSIUM SERPL-MCNC: 4.5 MMOL/L — SIGNIFICANT CHANGE UP (ref 3.5–5)
POTASSIUM SERPL-SCNC: 4.5 MMOL/L — SIGNIFICANT CHANGE UP (ref 3.5–5)
PROT SERPL-MCNC: 8 G/DL — SIGNIFICANT CHANGE UP (ref 6–8)
RBC # BLD: 5.2 M/UL — SIGNIFICANT CHANGE UP (ref 4.7–6.1)
RBC # FLD: 12.2 % — SIGNIFICANT CHANGE UP (ref 11.5–14.5)
SAO2 % BLDV: 50 % — SIGNIFICANT CHANGE UP
SAO2 % BLDV: 64 % — SIGNIFICANT CHANGE UP
SODIUM SERPL-SCNC: 140 MMOL/L — SIGNIFICANT CHANGE UP (ref 135–146)
VALPROATE SERPL-MCNC: 67 UG/ML — SIGNIFICANT CHANGE UP (ref 50–100)
WBC # BLD: 9.04 K/UL — SIGNIFICANT CHANGE UP (ref 4.8–10.8)
WBC # FLD AUTO: 9.04 K/UL — SIGNIFICANT CHANGE UP (ref 4.8–10.8)

## 2020-10-12 PROCEDURE — 71045 X-RAY EXAM CHEST 1 VIEW: CPT | Mod: 26

## 2020-10-12 PROCEDURE — 99284 EMERGENCY DEPT VISIT MOD MDM: CPT

## 2020-10-12 PROCEDURE — 93010 ELECTROCARDIOGRAM REPORT: CPT

## 2020-10-12 RX ORDER — SODIUM CHLORIDE 9 MG/ML
1000 INJECTION, SOLUTION INTRAVENOUS ONCE
Refills: 0 | Status: COMPLETED | OUTPATIENT
Start: 2020-10-12 | End: 2020-10-12

## 2020-10-12 RX ADMIN — SODIUM CHLORIDE 1000 MILLILITER(S): 9 INJECTION, SOLUTION INTRAVENOUS at 19:48

## 2020-10-12 RX ADMIN — SODIUM CHLORIDE 1000 MILLILITER(S): 9 INJECTION, SOLUTION INTRAVENOUS at 18:29

## 2020-10-12 RX ADMIN — SODIUM CHLORIDE 1000 MILLILITER(S): 9 INJECTION, SOLUTION INTRAVENOUS at 21:22

## 2020-10-12 NOTE — ED ADULT NURSE REASSESSMENT NOTE - NS ED NURSE REASSESS COMMENT FT1
Pt assessed. Pt is awake and alert. Not lethargic, able to open eyes, follow commands. Pt is not in any distress. Eating and drinking well. Third bolus running. Cardiac and 02 montioring maintained. Safety and comfort. Will continue to monitor. Fall precautions maintained.

## 2020-10-12 NOTE — ED PROVIDER NOTE - CHIEF COMPLAINT
Problem: Respiratory Impairment - Respiratory Therapy 253  Goal: Demonstrates optimal level of respiratory function 4857    Intervention: Inhaled medication delivery  Intervention Status  Done         The patient is a 35y Male complaining of medical evaluation.

## 2020-10-12 NOTE — ED ADULT NURSE REASSESSMENT NOTE - NS ED NURSE REASSESS COMMENT FT1
Pt received from previous RN. Pt assessed. Pt is alert and oriented. pt able to answer all questions but is lethargic. Responds to commands, but does not want to open eyes when asked. Repeat VBG sent. Second LR bolus running. Cardiac ans 02 monitoring maintained. Safety and comfort maintained. Fall precautions maintained; alarm on. Will continue to monitor.

## 2020-10-12 NOTE — ED ADULT TRIAGE NOTE - CHIEF COMPLAINT QUOTE
as per EMS patient was found outside of [facility] "unresponsive, he and another david are known for smoking K2", patient A&O x4 in triage, patient denies any drug use or "smoking anything", patient dozing off during triage

## 2020-10-12 NOTE — ED PROVIDER NOTE - PROGRESS NOTE DETAILS
Pt signed out to me by Dr. Campbell. Pt now alert and oriented. Says he probably had a seizure. Has a hx of seizures in the past. Keppra dose given in the ED. Encouraged to f/u w his neurologist. Pt verbazlied understanding and agrees w the plan. Full DC instructions discussed and patient knows when to seek immediate medical attention. Patient has proper follow-up. All results discussed with the patient they may require further work-up. Limitations of ED work-up discussed. All  questions and concerns from patient or family addressed. Understanding of insturctions verbalized

## 2020-10-12 NOTE — ED PROVIDER NOTE - PATIENT PORTAL LINK FT
You can access the FollowMyHealth Patient Portal offered by [facility] by registering at the following website: [email or web address]. By joining [employer]’s FollowMyHealth portal, you will also be able to view your health information using other applications (apps) compatible with our system.

## 2020-10-12 NOTE — ED PROVIDER NOTE - CLINICAL SUMMARY MEDICAL DECISION MAKING FREE TEXT BOX
pt feeling much better, bp improved, tolerated PO in ED and wants to go home  ? szr episode as evidenced by elevated lactate Pt signed out to me by Dr. Campbell. Pt now alert and oriented. Says he probably had a seizure. Has a hx of seizures in the past. Keppra dose given in the ED. Encouraged to f/u w his neurologist. Pt verbazlied understanding and agrees w the plan. Full DC instructions discussed and patient knows when to seek immediate medical attention. Patient has proper follow-up. All results discussed with the patient they may require further work-up. Limitations of ED work-up discussed. All  questions and concerns from patient or family addressed. Understanding of insturctions verbalized

## 2020-10-12 NOTE — ED PROVIDER NOTE - SEPSIS ALERT QUESTION 1
This patient was evaluated for sepsis.  At this time, a diagnosis of sepsis is not supported by the overall clinical picture. DISCHARGE

## 2020-10-12 NOTE — ED PROVIDER NOTE - ATTENDING CONTRIBUTION TO CARE
35 [facility]  pt found on bathroom floor in psych facility and has h/o szr disorder.  initially altered mental status but improving rapidly in ED.   pt denies any complaints.  AVSS, exam as noted, CTAB, RRR, abdomen soft NTND, (+) bowel sounds, neuro nonfocal

## 2020-10-12 NOTE — ED ADULT NURSE NOTE - CHIEF COMPLAINT QUOTE
as per EMS patient was found outside of [facility] "unresponsive, he and another david are known for smoking K2", patient A&O x4 in triage, patient denies any drug use or "smoking anything", patient dozing off during triage

## 2020-10-12 NOTE — ED ADULT NURSE NOTE - OBJECTIVE STATEMENT
as per EMS patient was found outside of [facility] "unresponsive, he and another david are known for smoking K2", patient A&O x4 in triage, patient denies any drug use or "smoking anything", patient dozing off during triage

## 2020-10-12 NOTE — ED PROVIDER NOTE - OBJECTIVE STATEMENT
36 yo m, pmh of seizure disorder, found on floor unconscious on bathroom floor in psych facility. B/P was 63/29 on the field. K2 overdose suspected. Pt. denies taking any substance and does not have any complaints.

## 2020-10-12 NOTE — ED PROVIDER NOTE - CARE PROVIDER_API CALL
Shellie Braden  INTERNAL MEDICINE  [address], [address]  [place] [address]  Phone: [phone]  Fax: [phone]  Follow Up Time:

## 2020-10-13 VITALS
DIASTOLIC BLOOD PRESSURE: 67 MMHG | OXYGEN SATURATION: 99 % | RESPIRATION RATE: 18 BRPM | HEART RATE: 58 BPM | SYSTOLIC BLOOD PRESSURE: 123 MMHG | TEMPERATURE: 98 F

## 2020-10-13 LAB
BASE EXCESS BLDV CALC-SCNC: 5.1 MMOL/L — HIGH (ref -2–2)
CA-I SERPL-SCNC: 1.19 MMOL/L — SIGNIFICANT CHANGE UP (ref 1.12–1.3)
GAS PNL BLDV: 139 MMOL/L — SIGNIFICANT CHANGE UP (ref 136–145)
GAS PNL BLDV: SIGNIFICANT CHANGE UP
HCO3 BLDV-SCNC: 30 MMOL/L — HIGH (ref 22–29)
HCT VFR BLDA CALC: 42.8 % — SIGNIFICANT CHANGE UP (ref 34–44)
HGB BLD CALC-MCNC: 14 G/DL — SIGNIFICANT CHANGE UP (ref 14–18)
LACTATE BLDV-MCNC: 1.2 MMOL/L — SIGNIFICANT CHANGE UP (ref 0.5–1.6)
PCO2 BLDV: 45 MMHG — SIGNIFICANT CHANGE UP (ref 41–51)
PH BLDV: 7.44 — HIGH (ref 7.26–7.43)
PO2 BLDV: 67 MMHG — HIGH (ref 20–40)
POTASSIUM BLDV-SCNC: 3.4 MMOL/L — SIGNIFICANT CHANGE UP (ref 3.3–5.6)
SAO2 % BLDV: 93 % — SIGNIFICANT CHANGE UP

## 2020-10-13 PROCEDURE — 70450 CT HEAD/BRAIN W/O DYE: CPT | Mod: 26

## 2020-10-13 RX ORDER — LEVETIRACETAM 250 MG/1
750 TABLET, FILM COATED ORAL ONCE
Refills: 0 | Status: DISCONTINUED | OUTPATIENT
Start: 2020-10-13 | End: 2020-10-13

## 2020-10-13 RX ORDER — LEVETIRACETAM 250 MG/1
1500 TABLET, FILM COATED ORAL
Refills: 0 | Status: DISCONTINUED | OUTPATIENT
Start: 2020-10-13 | End: 2020-10-13

## 2020-10-13 RX ADMIN — LEVETIRACETAM 1500 MILLIGRAM(S): 250 TABLET, FILM COATED ORAL at 02:20

## 2020-10-13 NOTE — ED ADULT NURSE REASSESSMENT NOTE - NS ED NURSE REASSESS COMMENT FT1
Pt assessed. Pt is awake and alert. Pt returned from CT. VBG sent, Cardiac and 02 monitoring maintained. Pt is not in any distress. Safety and comfort maintained. Will continue to monitor.

## 2020-10-13 NOTE — ED ADULT NURSE REASSESSMENT NOTE - NS ED NURSE REASSESS COMMENT FT1
Pt assessed. Pt is awake and alert. pt denies any pain or discomfort. Pt discharged. Keppra given. Pt not in any distress. IV removed. Ambulance placed to go back to [facility]. Awaiting ambulance. Safety and comfort maintained. Will continue to monitor.

## 2020-11-17 ENCOUNTER — EMERGENCY (EMERGENCY)
Facility: HOSPITAL | Age: 35
LOS: 0 days | Discharge: HOME | End: 2020-11-17
Attending: EMERGENCY MEDICINE | Admitting: EMERGENCY MEDICINE
Payer: MEDICAID

## 2020-11-17 VITALS
HEIGHT: 66 IN | TEMPERATURE: 98 F | OXYGEN SATURATION: 98 % | DIASTOLIC BLOOD PRESSURE: 65 MMHG | SYSTOLIC BLOOD PRESSURE: 99 MMHG | RESPIRATION RATE: 16 BRPM | HEART RATE: 64 BPM

## 2020-11-17 VITALS — TEMPERATURE: 98 F | HEART RATE: 68 BPM | DIASTOLIC BLOOD PRESSURE: 69 MMHG | SYSTOLIC BLOOD PRESSURE: 115 MMHG

## 2020-11-17 DIAGNOSIS — R07.9 CHEST PAIN, UNSPECIFIED: ICD-10-CM

## 2020-11-17 DIAGNOSIS — R07.89 OTHER CHEST PAIN: ICD-10-CM

## 2020-11-17 LAB
ALBUMIN SERPL ELPH-MCNC: 4.2 G/DL — SIGNIFICANT CHANGE UP (ref 3.5–5.2)
ALP SERPL-CCNC: 28 U/L — LOW (ref 30–115)
ALT FLD-CCNC: 11 U/L — SIGNIFICANT CHANGE UP (ref 0–41)
ANION GAP SERPL CALC-SCNC: 7 MMOL/L — SIGNIFICANT CHANGE UP (ref 7–14)
AST SERPL-CCNC: 16 U/L — SIGNIFICANT CHANGE UP (ref 0–41)
BASOPHILS # BLD AUTO: 0.01 K/UL — SIGNIFICANT CHANGE UP (ref 0–0.2)
BASOPHILS NFR BLD AUTO: 0.1 % — SIGNIFICANT CHANGE UP (ref 0–1)
BILIRUB SERPL-MCNC: 0.4 MG/DL — SIGNIFICANT CHANGE UP (ref 0.2–1.2)
BUN SERPL-MCNC: 14 MG/DL — SIGNIFICANT CHANGE UP (ref 10–20)
CALCIUM SERPL-MCNC: 9.7 MG/DL — SIGNIFICANT CHANGE UP (ref 8.5–10.1)
CHLORIDE SERPL-SCNC: 102 MMOL/L — SIGNIFICANT CHANGE UP (ref 98–110)
CO2 SERPL-SCNC: 28 MMOL/L — SIGNIFICANT CHANGE UP (ref 17–32)
CREAT SERPL-MCNC: 1.1 MG/DL — SIGNIFICANT CHANGE UP (ref 0.7–1.5)
EOSINOPHIL # BLD AUTO: 0.01 K/UL — SIGNIFICANT CHANGE UP (ref 0–0.7)
EOSINOPHIL NFR BLD AUTO: 0.1 % — SIGNIFICANT CHANGE UP (ref 0–8)
GLUCOSE SERPL-MCNC: 72 MG/DL — SIGNIFICANT CHANGE UP (ref 70–99)
HCT VFR BLD CALC: 44.5 % — SIGNIFICANT CHANGE UP (ref 42–52)
HGB BLD-MCNC: 15 G/DL — SIGNIFICANT CHANGE UP (ref 14–18)
IMM GRANULOCYTES NFR BLD AUTO: 0.3 % — SIGNIFICANT CHANGE UP (ref 0.1–0.3)
LYMPHOCYTES # BLD AUTO: 2.21 K/UL — SIGNIFICANT CHANGE UP (ref 1.2–3.4)
LYMPHOCYTES # BLD AUTO: 28.4 % — SIGNIFICANT CHANGE UP (ref 20.5–51.1)
MCHC RBC-ENTMCNC: 31.4 PG — HIGH (ref 27–31)
MCHC RBC-ENTMCNC: 33.7 G/DL — SIGNIFICANT CHANGE UP (ref 32–37)
MCV RBC AUTO: 93.3 FL — SIGNIFICANT CHANGE UP (ref 80–94)
MONOCYTES # BLD AUTO: 0.55 K/UL — SIGNIFICANT CHANGE UP (ref 0.1–0.6)
MONOCYTES NFR BLD AUTO: 7.1 % — SIGNIFICANT CHANGE UP (ref 1.7–9.3)
NEUTROPHILS # BLD AUTO: 4.98 K/UL — SIGNIFICANT CHANGE UP (ref 1.4–6.5)
NEUTROPHILS NFR BLD AUTO: 64 % — SIGNIFICANT CHANGE UP (ref 42.2–75.2)
NRBC # BLD: 0 /100 WBCS — SIGNIFICANT CHANGE UP (ref 0–0)
PLATELET # BLD AUTO: 179 K/UL — SIGNIFICANT CHANGE UP (ref 130–400)
POTASSIUM SERPL-MCNC: 4.5 MMOL/L — SIGNIFICANT CHANGE UP (ref 3.5–5)
POTASSIUM SERPL-SCNC: 4.5 MMOL/L — SIGNIFICANT CHANGE UP (ref 3.5–5)
PROT SERPL-MCNC: 7.2 G/DL — SIGNIFICANT CHANGE UP (ref 6–8)
RBC # BLD: 4.77 M/UL — SIGNIFICANT CHANGE UP (ref 4.7–6.1)
RBC # FLD: 11.6 % — SIGNIFICANT CHANGE UP (ref 11.5–14.5)
SODIUM SERPL-SCNC: 137 MMOL/L — SIGNIFICANT CHANGE UP (ref 135–146)
TROPONIN T SERPL-MCNC: <0.01 NG/ML — SIGNIFICANT CHANGE UP
WBC # BLD: 7.78 K/UL — SIGNIFICANT CHANGE UP (ref 4.8–10.8)
WBC # FLD AUTO: 7.78 K/UL — SIGNIFICANT CHANGE UP (ref 4.8–10.8)

## 2020-11-17 PROCEDURE — 93010 ELECTROCARDIOGRAM REPORT: CPT

## 2020-11-17 PROCEDURE — 71046 X-RAY EXAM CHEST 2 VIEWS: CPT | Mod: 26

## 2020-11-17 PROCEDURE — 99285 EMERGENCY DEPT VISIT HI MDM: CPT

## 2020-11-17 RX ORDER — IBUPROFEN 200 MG
600 TABLET ORAL ONCE
Refills: 0 | Status: COMPLETED | OUTPATIENT
Start: 2020-11-17 | End: 2020-11-17

## 2020-11-17 RX ORDER — SODIUM CHLORIDE 9 MG/ML
1000 INJECTION, SOLUTION INTRAVENOUS ONCE
Refills: 0 | Status: COMPLETED | OUTPATIENT
Start: 2020-11-17 | End: 2020-11-17

## 2020-11-17 RX ADMIN — SODIUM CHLORIDE 1000 MILLILITER(S): 9 INJECTION, SOLUTION INTRAVENOUS at 16:48

## 2020-11-17 RX ADMIN — Medication 600 MILLIGRAM(S): at 16:48

## 2020-11-17 NOTE — ED PROVIDER NOTE - PROGRESS NOTE DETAILS
TC: 34 yo M with TC: 36 yo M with PMHx of hep C, schizophrenia, seizures who presents with nonpleuritic R sided cp x2 days. Never seen cardio before. PERC negative. Here in ED, vitals wnl. Pain reproducible on exam. Ordered labs, ekg, cxr. Given IVF, motrin. Will reassess. TC: Labs wnl including negative trop. Ekg with early repol similar to prior. Cxr negative. HEART score 0. Given PMD and cardio f/u. Strict ED return precautions given. Pt verbalized understanding and was agreeable with plan.

## 2020-11-17 NOTE — ED PROVIDER NOTE - PHYSICAL EXAMINATION
CONSTITUTIONAL: well developed, nontoxic appearing, in no acute distress, speaking in full sentences  SKIN: warm, dry, no rash, cap refill < 2 seconds  HEENT: normocephalic, atraumatic, no conjunctival erythema, moist mucous membranes, patent airway, poor dentition  NECK: supple  CV:  regular rate, regular rhythm, 2+ radial pulses bilaterally  CHEST: tenderness to palpation to R lower ribs anteriorly   RESP: no wheezes, no rales, no rhonchi, normal work of breathing  ABD: soft, nontender, nondistended, no rebound, no guarding  MSK: normal ROM, no cyanosis, no edema, no calf tenderness  NEURO: alert, oriented, grossly unremarkable  PSYCH: cooperative, appropriate

## 2020-11-17 NOTE — ED PROVIDER NOTE - ATTENDING CONTRIBUTION TO CARE
36 yo male PMH Hep C , schizophrenia, seizures c/o rt sided chest pain for 2 days.  Worse with  movement, non-radiating and not associated with any other additional complaints.  Denies any h/o trauma.   Well-appearing well-nourished, NAD, head AT/NC, PERRL, pink conjunctivae, nml phonation , supple neck without midline spine ttp, nml work of breathing, lungs CTA b/l, equal air entry, speaking full sentences, + rt lateral chest wall ttp , RRR, well-perfused extremities, distal pulses intact, abdomen soft, NT/ND, BS present in all quadrants, skin nml color and temperature, no rash or skin lesions on the thorax, , no midline spine or CVA ttp, no leg edema or unilateral calf swelling, A&Ox3, no focal neuro deficits, nml mood and affect.   ECG is unchanged from prior on record.  Will get CXR, pain meds, reassess.  patient was given food, will reassess.

## 2020-11-17 NOTE — ED PROVIDER NOTE - CLINICAL SUMMARY MEDICAL DECISION MAKING FREE TEXT BOX
34 yo male with non-traumatic rt chest wall pain, exam reassuring, ECG unchanged from prior, CXR WNL.  Patient appears well, stable for d/c .

## 2020-11-17 NOTE — ED PROVIDER NOTE - CARE PROVIDER_API CALL
Shellie Braden  INTERNAL MEDICINE  [address], [address]  [place] [address]  Phone: [phone]  Fax: [phone]  Follow Up Time:     Wilmar Little (MD)  Cardiovascular Disease; Internal Medicine; Interventional Cardiology  [address]  [place] [address]  Phone: [phone]  Fax: [phone]  Follow Up Time:

## 2020-11-17 NOTE — ED PROVIDER NOTE - PATIENT PORTAL LINK FT
You can access the FollowMyHealth Patient Portal offered by [facility] by registering at the following website: [email or web address]. By joining [employer]’s FollowMyHealth portal, you will also be able to view your health information using other applications (apps) compatible with our system.

## 2020-11-17 NOTE — ED PROVIDER NOTE - NS ED ROS FT
GEN:  no fever, no chills, no generalized weakness  NEURO:  no headache, no dizziness  ENT: no sore throat, no runny nose  CV:  + chest pain, no palpitations  RESP:  no sob, no cough  GI:  no nausea, no vomiting, no abdominal pain, no diarrhea  :  no dysuria, no urinary frequency, no hematuria  MSK:  no joint pain, no edema  SKIN:  no rash, no bruising  HEME: no hematochezia, no melena

## 2020-11-17 NOTE — ED PROVIDER NOTE - OBJECTIVE STATEMENT
36 yo M with PMHx of hep C, schizophrenia, seizures who presents from [address] [place] [address] for intermittent, waxing/waning, nonradiating, sharp R sided cp x 2 days. Sx worse with movement/exertion, improved with rest. No fever, chills, sob, cough, recent trauma, heavy lifting, leg swelling/pain, hx of clots, hormone use, recent immobilization/surg. No prior cardiac workup. No FHx of CAD at early age. Pt denies any substance use however has hx of K2 use per chart review.

## 2020-11-17 NOTE — ED PROVIDER NOTE - NSFOLLOWUPINSTRUCTIONS_ED_ALL_ED_FT
Costochondritis    WHAT YOU NEED TO KNOW:    What is costochondritis? Costochondritis is a condition that causes pain in the cartilage that connect your ribs to your sternum (breastbone). Cartilage is the tough, bendable tissue that protects your bones.     What causes costochondritis? The cause of costochondritis may be unknown, or it may be caused by any of the following:   •Chest injury: An injury to your chest may cause costochondritis.     •Strain: Activities that strain your chest wall muscles can lead to costochondritis. This includes hard coughing. Strain can also occur while you are playing sports with repeated arm movements, such as rowing, weightlifting, and volleyball.    •Infection: Lung or chest infections can increase your risk of costochondritis.     •Inflammatory diseases: Diseases that cause swelling around your joints, such as rheumatoid arthritis, increase your risk of costochondritis.    What are the signs and symptoms of costochondritis? Costochondritis causes pain in the area where your sternum joins with your ribs. The pain may come and go, and may get worse over time. The pain may be sharp, or dull and aching. It may be painful to touch your chest. The pain may spread to your back, abdomen, or down your arm. It may get worse when you move, breathe deeply, or push or lift an object. The pain may make it hard for you to sleep or do your usual activities.     How is costochondritis diagnosed? Your healthcare provider will ask you about your signs and symptoms. He will also do a physical exam. He will touch your chest and may move your arms to see if this causes pain.    How is costochondritis treated? Costochondritis pain may go away without treatment, usually within a year. Your treatment depends on the cause of your costochondritis. You may need any of the following:   •Acetaminophen: This medicine decreases pain. Acetaminophen is available without a doctor's order. Ask how much to take and how often to take it. Follow directions. Acetaminophen can cause liver damage if not taken correctly.    •NSAIDs, such as ibuprofen, help decrease swelling, pain, and fever. This medicine is available with or without a doctor's order. NSAIDs can cause stomach bleeding or kidney problems in certain people. If you take blood thinner medicine, always ask if NSAIDs are safe for you. Always read the medicine label and follow directions. Do not give these medicines to children under 6 months of age without direction from your child's healthcare provider.    What can I do to help decrease the pain caused by costochondritis?   •Rest: You may need to rest and avoid painful movements and activities. Do not carry objects, such as a purse or backpack, if this causes pain. Avoid activities such as weightlifting until your pain decreases or goes away. Ask your healthcare provider which activities are best for you to do while you recover.    •Heat: Heat helps decrease pain in some patients. Apply heat on the area for 20 to 30 minutes every 2 hours for as many days as directed.     •Ice: Ice helps decrease swelling and pain. Ice may also help prevent tissue damage. Use an ice pack, or put crushed ice in a plastic bag. Cover it with a towel and place it on the painful area for 15 to 20 minutes every hour or as directed.    •Stretching exercises: Gentle stretching may help your symptoms.  a doorway and put your hands on the door frame at the level of your ears or shoulders. Take 1 step forward and gently stretch your chest. Try this with your hands higher up on the doorway.     When should I contact my healthcare provider?   •You have a fever.  •The painful areas of your chest look swollen, red, and feel warm to the touch.   •You cannot sleep because of the pain.  •You have questions or concerns about your condition or care.    CARE AGREEMENT:    You have the right to help plan your care. Learn about your health condition and how it may be treated. Discuss treatment options with your healthcare providers to decide what care you want to receive. You always have the right to refuse treatment.      © Copyright [employer] 2020

## 2020-11-17 NOTE — ED PROVIDER NOTE - NS_EDPROVIDERDISPOUSERTYPE_ED_A_ED
SUBJECTIVE:   Salima Smith is a 40 year old female who presents to clinic today for the following health issues:    Recheck: weight /follow-up med check - phentermine        Amount of exercise or physical activity: Planning to start exercising 3-4 days/week for an average of 45-60 minutes.  has signed up with [employer], so she plans to have more regular  exercise routine.     Problems taking medications regularly: No, she took it for 2 weekd but then she decided to stop it bc she did not want to take medicine,  although no problem taking med's     Medication side effects: none, and she was to try the medication again.     Diet: low fat/cholesterol, carbohydrate counting , eating healthier.    She is status post bypass surgery, so concerned with her weight going up again.    She also had labs done a few months ago and B12 was quite high, she may have been taking higher dose of supplement.  She has reduced the dose wants lab again.           Problem list and histories reviewed & adjusted, as indicated.  Additional history: as documented    Patient Active Problem List   Diagnosis     Seasonal allergic rhinitis, unspecified chronicity, unspecified trigger     S/P bariatric surgery     BMI 27.0-27.9,adult     Past Surgical History:   Procedure Laterality Date     ABDOMEN SURGERY      tummy tuck 2008      BARIATRIC SURGERY      2005     DILATION AND CURETTAGE      2017 , bc of heavy periods      ENT SURGERY      nasal surgery        Social History   Substance Use Topics     Smoking status: Never Smoker     Smokeless tobacco: Never Used     Alcohol use Yes     Family History   Problem Relation Age of Onset     Coronary Artery Disease Paternal Grandmother      Diabetes Other      paternal uncle ,      Colon Cancer Other      mom's sister      Cerebrovascular Disease No family hx of      Breast Cancer No family hx of            Reviewed and updated as needed this visit by clinical staff       Reviewed and updated as  "needed this visit by Provider         ROS:  Constitutional, HEENT, cardiovascular, pulmonary, GI, , musculoskeletal, neuro, skin, endocrine and psych systems are negative, except as otherwise noted.    OBJECTIVE:     /72 (BP Location: Left arm, Patient Position: Chair, Cuff Size: Adult Regular)  Pulse 95  Temp 97.4  F (36.3  C) (Tympanic)  Ht 5' 7\" (1.702 m)  Wt 185 lb 9.6 oz (84.2 kg)  SpO2 100%  Breastfeeding? No  BMI 29.07 kg/m2  Body mass index is 29.07 kg/(m^2).  GENERAL: healthy, alert and no distress  RESP: lungs clear to auscultation - no rales, rhonchi or wheezes  CV: regular rate and rhythm, normal S1 S2, no S3 or S4, no murmur, click or rub, no peripheral edema and peripheral pulses strong  ABDOMEN: soft, nontender, no hepatosplenomegaly, no masses and bowel sounds normal        ASSESSMENT/PLAN:         (R63.5) Weight gain  Comment:   Plan: phentermine (ADIPEX-P) 37.5 MG tablet            (Z68.29) BMI 29.0-29.9,adult  Comment:   Plan: Healthy diet and exercise reviewed.  Risks of obesity discussed.  Encourage exercise. . Phentermine again.  She will follow-up in 4-6 weeks sooner if any problem      (Z98.84) S/P bariatric surgery  Comment:   Plan: phentermine (ADIPEX-P) 37.5 MG tablet          (E53.8) Vitamin B 12 deficiency  (primary encounter diagnosis)  Comment: Slightly high last time, she has decreased the dose now.  We will recheck.   Plan: Vitamin B12, Folate                    Marj Mead MD  [facility]  " Attending Attestation (For Attendings USE Only)...

## 2020-11-21 ENCOUNTER — EMERGENCY (EMERGENCY)
Facility: HOSPITAL | Age: 35
LOS: 0 days | Discharge: HOME | End: 2020-11-21
Attending: EMERGENCY MEDICINE | Admitting: EMERGENCY MEDICINE
Payer: MEDICAID

## 2020-11-21 VITALS
SYSTOLIC BLOOD PRESSURE: 125 MMHG | WEIGHT: 130.07 LBS | RESPIRATION RATE: 16 BRPM | TEMPERATURE: 98 F | OXYGEN SATURATION: 99 % | DIASTOLIC BLOOD PRESSURE: 74 MMHG | HEIGHT: 66 IN | HEART RATE: 71 BPM

## 2020-11-21 DIAGNOSIS — K08.89 OTHER SPECIFIED DISORDERS OF TEETH AND SUPPORTING STRUCTURES: ICD-10-CM

## 2020-11-21 DIAGNOSIS — F20.9 SCHIZOPHRENIA, UNSPECIFIED: ICD-10-CM

## 2020-11-21 DIAGNOSIS — Z79.899 OTHER LONG TERM (CURRENT) DRUG THERAPY: ICD-10-CM

## 2020-11-21 DIAGNOSIS — G40.909 EPILEPSY, UNSPECIFIED, NOT INTRACTABLE, WITHOUT STATUS EPILEPTICUS: ICD-10-CM

## 2020-11-21 PROCEDURE — 99283 EMERGENCY DEPT VISIT LOW MDM: CPT

## 2020-11-21 RX ORDER — AMOXICILLIN 250 MG/5ML
1 SUSPENSION, RECONSTITUTED, ORAL (ML) ORAL
Qty: 21 | Refills: 0
Start: 2020-11-21 | End: 2020-11-27

## 2020-11-21 RX ORDER — IBUPROFEN 200 MG
600 TABLET ORAL ONCE
Refills: 0 | Status: COMPLETED | OUTPATIENT
Start: 2020-11-21 | End: 2020-11-21

## 2020-11-21 RX ORDER — AMOXICILLIN 250 MG/5ML
500 SUSPENSION, RECONSTITUTED, ORAL (ML) ORAL ONCE
Refills: 0 | Status: COMPLETED | OUTPATIENT
Start: 2020-11-21 | End: 2020-11-21

## 2020-11-21 RX ORDER — IBUPROFEN 200 MG
1 TABLET ORAL
Qty: 40 | Refills: 0
Start: 2020-11-21 | End: 2020-11-30

## 2020-11-21 RX ADMIN — Medication 600 MILLIGRAM(S): at 22:24

## 2020-11-21 RX ADMIN — Medication 500 MILLIGRAM(S): at 22:24

## 2020-11-21 NOTE — ED ADULT NURSE NOTE - OBJECTIVE STATEMENT
Pt. complains of dental pain and mouth pain for the last few weeks. Pt. denies taking anything for pain. Pt. states that he have not seen a dentist

## 2020-11-21 NOTE — ED PROVIDER NOTE - CLINICAL SUMMARY MEDICAL DECISION MAKING FREE TEXT BOX
35M p/w dental pain x 1 week, left upper teeth, did not take any meds, constant, 7/10. On physical with ttp of multiple upper teeth. given ibuprofen, amox, dc home

## 2020-11-21 NOTE — ED PROVIDER NOTE - NSFOLLOWUPCLINICS_GEN_ALL_ED_FT
[facility]  Dental  [address] [place] [id number]  Phone: [phone]  Fax:   Follow Up Time:

## 2020-11-21 NOTE — ED PROVIDER NOTE - PATIENT PORTAL LINK FT
You can access the FollowMyHealth Patient Portal offered by [facility] by registering at the following website: [email or web address]. By joining [employer]’s FollowMyHealth portal, you will also be able to view your health information using other applications (apps) compatible with our system.

## 2020-11-21 NOTE — ED PROVIDER NOTE - PHYSICAL EXAMINATION
CONST: Well appearing in NAD  EYES: PERRL, EOMI, Sclera and conjunctiva clear.   ENT: poor dentition, + ttp to multiple upper teeth, No nasal discharge. Oropharynx normal appearing, no erythema or exudates. No abscess or swelling. Uvula midline. No temporal artery or mastoid tenderness.  NECK: Non-tender, no meningeal signs. normal ROM. supple   SKIN: Warm, dry, no acute rashes. Good turgor

## 2021-01-25 ENCOUNTER — EMERGENCY (EMERGENCY)
Facility: HOSPITAL | Age: 36
LOS: 0 days | Discharge: HOME | End: 2021-01-25
Attending: EMERGENCY MEDICINE | Admitting: INTERNAL MEDICINE
Payer: MEDICAID

## 2021-01-25 VITALS
TEMPERATURE: 98 F | DIASTOLIC BLOOD PRESSURE: 73 MMHG | HEART RATE: 93 BPM | RESPIRATION RATE: 18 BRPM | OXYGEN SATURATION: 97 % | SYSTOLIC BLOOD PRESSURE: 128 MMHG

## 2021-01-25 VITALS — HEIGHT: 66 IN

## 2021-01-25 DIAGNOSIS — R56.9 UNSPECIFIED CONVULSIONS: ICD-10-CM

## 2021-01-25 DIAGNOSIS — F17.200 NICOTINE DEPENDENCE, UNSPECIFIED, UNCOMPLICATED: ICD-10-CM

## 2021-01-25 LAB
ALBUMIN SERPL ELPH-MCNC: 5 G/DL — SIGNIFICANT CHANGE UP (ref 3.5–5.2)
ALP SERPL-CCNC: 25 U/L — LOW (ref 30–115)
ALT FLD-CCNC: 18 U/L — SIGNIFICANT CHANGE UP (ref 0–41)
ANION GAP SERPL CALC-SCNC: 23 MMOL/L — HIGH (ref 7–14)
APAP SERPL-MCNC: <5 UG/ML — LOW (ref 10–30)
AST SERPL-CCNC: 19 U/L — SIGNIFICANT CHANGE UP (ref 0–41)
BASOPHILS # BLD AUTO: 0.01 K/UL — SIGNIFICANT CHANGE UP (ref 0–0.2)
BASOPHILS NFR BLD AUTO: 0.1 % — SIGNIFICANT CHANGE UP (ref 0–1)
BILIRUB SERPL-MCNC: 0.5 MG/DL — SIGNIFICANT CHANGE UP (ref 0.2–1.2)
BUN SERPL-MCNC: 13 MG/DL — SIGNIFICANT CHANGE UP (ref 10–20)
CALCIUM SERPL-MCNC: 9.8 MG/DL — SIGNIFICANT CHANGE UP (ref 8.5–10.1)
CHLORIDE SERPL-SCNC: 100 MMOL/L — SIGNIFICANT CHANGE UP (ref 98–110)
CO2 SERPL-SCNC: 17 MMOL/L — SIGNIFICANT CHANGE UP (ref 17–32)
CREAT SERPL-MCNC: 1.4 MG/DL — SIGNIFICANT CHANGE UP (ref 0.7–1.5)
EOSINOPHIL # BLD AUTO: 0.01 K/UL — SIGNIFICANT CHANGE UP (ref 0–0.7)
EOSINOPHIL NFR BLD AUTO: 0.1 % — SIGNIFICANT CHANGE UP (ref 0–8)
ETHANOL SERPL-MCNC: <10 MG/DL — SIGNIFICANT CHANGE UP
GLUCOSE SERPL-MCNC: 116 MG/DL — HIGH (ref 70–99)
HCT VFR BLD CALC: 50.2 % — SIGNIFICANT CHANGE UP (ref 42–52)
HGB BLD-MCNC: 16.9 G/DL — SIGNIFICANT CHANGE UP (ref 14–18)
IMM GRANULOCYTES NFR BLD AUTO: 0.6 % — HIGH (ref 0.1–0.3)
LYMPHOCYTES # BLD AUTO: 1.71 K/UL — SIGNIFICANT CHANGE UP (ref 1.2–3.4)
LYMPHOCYTES # BLD AUTO: 21.9 % — SIGNIFICANT CHANGE UP (ref 20.5–51.1)
MAGNESIUM SERPL-MCNC: 2.3 MG/DL — SIGNIFICANT CHANGE UP (ref 1.8–2.4)
MCHC RBC-ENTMCNC: 31.8 PG — HIGH (ref 27–31)
MCHC RBC-ENTMCNC: 33.7 G/DL — SIGNIFICANT CHANGE UP (ref 32–37)
MCV RBC AUTO: 94.5 FL — HIGH (ref 80–94)
MONOCYTES # BLD AUTO: 0.56 K/UL — SIGNIFICANT CHANGE UP (ref 0.1–0.6)
MONOCYTES NFR BLD AUTO: 7.2 % — SIGNIFICANT CHANGE UP (ref 1.7–9.3)
NEUTROPHILS # BLD AUTO: 5.47 K/UL — SIGNIFICANT CHANGE UP (ref 1.4–6.5)
NEUTROPHILS NFR BLD AUTO: 70.1 % — SIGNIFICANT CHANGE UP (ref 42.2–75.2)
NRBC # BLD: 0 /100 WBCS — SIGNIFICANT CHANGE UP (ref 0–0)
PLATELET # BLD AUTO: 156 K/UL — SIGNIFICANT CHANGE UP (ref 130–400)
POTASSIUM SERPL-MCNC: 4 MMOL/L — SIGNIFICANT CHANGE UP (ref 3.5–5)
POTASSIUM SERPL-SCNC: 4 MMOL/L — SIGNIFICANT CHANGE UP (ref 3.5–5)
PROT SERPL-MCNC: 7.9 G/DL — SIGNIFICANT CHANGE UP (ref 6–8)
RBC # BLD: 5.31 M/UL — SIGNIFICANT CHANGE UP (ref 4.7–6.1)
RBC # FLD: 11.6 % — SIGNIFICANT CHANGE UP (ref 11.5–14.5)
SALICYLATES SERPL-MCNC: <0.3 MG/DL — LOW (ref 4–30)
SODIUM SERPL-SCNC: 140 MMOL/L — SIGNIFICANT CHANGE UP (ref 135–146)
VALPROATE SERPL-MCNC: 36 UG/ML — LOW (ref 50–100)
WBC # BLD: 7.81 K/UL — SIGNIFICANT CHANGE UP (ref 4.8–10.8)
WBC # FLD AUTO: 7.81 K/UL — SIGNIFICANT CHANGE UP (ref 4.8–10.8)

## 2021-01-25 PROCEDURE — 93010 ELECTROCARDIOGRAM REPORT: CPT

## 2021-01-25 PROCEDURE — 99285 EMERGENCY DEPT VISIT HI MDM: CPT

## 2021-01-25 RX ORDER — DIVALPROEX SODIUM 500 MG/1
500 TABLET, DELAYED RELEASE ORAL ONCE
Refills: 0 | Status: COMPLETED | OUTPATIENT
Start: 2021-01-25 | End: 2021-01-25

## 2021-01-25 RX ORDER — LEVETIRACETAM 250 MG/1
1000 TABLET, FILM COATED ORAL ONCE
Refills: 0 | Status: COMPLETED | OUTPATIENT
Start: 2021-01-25 | End: 2021-01-25

## 2021-01-25 RX ADMIN — DIVALPROEX SODIUM 500 MILLIGRAM(S): 500 TABLET, DELAYED RELEASE ORAL at 18:30

## 2021-01-25 RX ADMIN — LEVETIRACETAM 1000 MILLIGRAM(S): 250 TABLET, FILM COATED ORAL at 18:30

## 2021-01-25 NOTE — ED ADULT NURSE REASSESSMENT NOTE - NS ED NURSE REASSESS COMMENT FT1
Patient assessed, Aox4, in no acute distress- patient resting in bed comfortably, awaiting transport back home.

## 2021-01-25 NOTE — ED PROVIDER NOTE - CLINICAL SUMMARY MEDICAL DECISION MAKING FREE TEXT BOX
34 Y/O M SEIZURE D/O, SCIZOPHRENIA FROM [facility] AFTER SEIZURE. ALL DIAGNOSTIC TESTING REVIEWED. PT GIVEN AEDS IN THE ED. EXAM NORMAL. PT DISCHARGED WITH NEURO FOLLOW UP.

## 2021-01-25 NOTE — ED ADULT TRIAGE NOTE - CHIEF COMPLAINT QUOTE
Pt from [facility], had a witnessed seizure today lasting less than 45 seconds. Pt from [facility], had a witnessed seizure today lasting less than 45 seconds. FS in triage 82 mg/dl.

## 2021-01-25 NOTE — ED PROVIDER NOTE - NS ED ROS FT
Constitutional: (-) fever, (-) chills  Eyes: (-) visual changes  ENT: (-) nasal congestions  Cardiovascular: (-) chest pain, (-) syncope  Respiratory: (-) cough, (-) shortness of breath, (-) dyspnea,   Gastrointestinal: (-) vomiting, (-) diarrhea, (-)nausea,  Musculoskeletal: (-) neck pain, (-) back pain, (-) joint pain,  Integumentary: (-) rash, (-) edema, (-) bruises  Neurological: (-) headache, (-) loc, (-) dizziness, (-) tingling, (-)numbness,  Psychiatric: (-) hallucinations, (-)nervousness, (-)depression, (-)SI/HI  Peripheral Vascular: (-) leg swelling  :  (-)dysuria,  (-) hematuria  Allergic/Immunologic: (-) pruritus no

## 2021-01-25 NOTE — ED ADULT NURSE NOTE - CHIEF COMPLAINT QUOTE
Pt from [facility], had a witnessed seizure today lasting less than 45 seconds. FS in triage 82 mg/dl.

## 2021-01-25 NOTE — ED ADULT NURSE REASSESSMENT NOTE - NS ED NURSE REASSESS COMMENT FT1
[address] RN was called, as per RN patient is allowed to walk back to his facility, he is not on a locked unit. Patient IV dced and patient walked back.

## 2021-01-25 NOTE — ED PROVIDER NOTE - PATIENT PORTAL LINK FT
You can access the FollowMyHealth Patient Portal offered by [facility] by registering at the following website: [email or web address]. By joining [employer]’s FollowMyHealth portal, you will also be able to view your health information using other applications (apps) compatible with our system.

## 2021-01-25 NOTE — ED PROVIDER NOTE - ATTENDING CONTRIBUTION TO CARE
I personally evaluated the patient. I reviewed the Resident’s or Physician Assistant’s note (as assigned above), and agree with the findings and plan except as documented in my note.   34 Y/O M SCHIZOPHRENIA, POLYSUBSTANCE ABUSE, SEIZURE D/O (ON KEPPRA AND DEPAKOTE), S/P WITNESSED SEIZURE THIS AM. SEIZURE WAS REPORTED AS GTC SEIZURE LASTING 45 SECINDS. PT NOW WITH NO COMPLAINTS. PT DID NOT TAKE HIS AEDS THIS AM. PT HAS ALSO BEEN SMOKING SYNTHETIC MARIJUANA RECENTLY. NO TRAUMA FROM SEIZURE. NO RECENT ILLNESS, FEVER, CHILLS, COUGH. PT SEEN IN ED 4 DAYS AGO FOR DENTAL PAIN. VITALS NOTED. . ALERT OX3 NAD. NCAT PERRL EOMI. CN 2-12 INTACT NORMAL SPEECH. NO FACIAL DROOP. 5/5 MOTOR STRENGTH B/L UPPER AND B/L LOWER EXT. NO PRONATOR DRIFT. NORMAL FINGER TO NOSE B/L. NO SENSORY DEFICIT. RRR. LUNGS CLEAR B/L. ABD- SOFT NONTENDER. BACK NONTENDER. 2+ RADIAL AND PEDAL PULSES B/L. NO EVIDENCE OF TRAUMA. PT AMBULATING WELL.

## 2021-01-25 NOTE — ED PROVIDER NOTE - PHYSICAL EXAMINATION
Physical Exam    Vital Signs: I have reviewed the initial vital signs.  Constitutional: well-nourished, appears stated age, no acute distress  Eyes: Conjunctiva pink, Sclera clear, PERRLA, EOMI.  ENT: OP is clear without exudates, normal dentition, normal gingival, tongue without swelling, TMs clear b/l, nasal turbinates without erythema or discharge, no lymphadenopathy.  Cardiovascular: S1 and S2, regular rate, regular rhythm, well-perfused extremities, radial pulses equal and 2+  Respiratory: unlabored respiratory effort, clear to auscultation bilaterally no wheezing, rales and rhonchi  Gastrointestinal: soft, non-tender abdomen, no pulsatile mass, normal bowl sounds  Musculoskeletal: supple neck, no lower extremity edema, no midline tenderness  Integumentary: warm, dry, no rash  Neurologic: awake, alert, cranial nerves II-XII grossly intact, extremities’ motor and sensory functions grossly intact  Psychiatric: appropriate mood, appropriate affect

## 2021-01-25 NOTE — ED PROVIDER NOTE - OBJECTIVE STATEMENT
36 yo M PMH schizophrenia, seizure d/o on Keppra and Depakote, substance abuse presents to ED from [facility] for witnessed seizure that lasted 45 seconds and then pt came back to baseline. Called [facility] and spoke to nurse that collaborates hx from EMS. Reports he has been smoking K2 frequently and did not take his meds this morning as he refused. Pt with no complaints. No pain or radiation. Donny fever, chills, CP, SOB, N/V/D, abd pain, joint pain or neck pain.

## 2021-02-21 ENCOUNTER — EMERGENCY (EMERGENCY)
Facility: HOSPITAL | Age: 36
LOS: 0 days | Discharge: HOME | End: 2021-02-21
Attending: EMERGENCY MEDICINE | Admitting: STUDENT IN AN ORGANIZED HEALTH CARE EDUCATION/TRAINING PROGRAM
Payer: MEDICAID

## 2021-02-21 VITALS
RESPIRATION RATE: 18 BRPM | SYSTOLIC BLOOD PRESSURE: 133 MMHG | TEMPERATURE: 97 F | OXYGEN SATURATION: 97 % | HEIGHT: 66 IN | DIASTOLIC BLOOD PRESSURE: 74 MMHG | HEART RATE: 90 BPM

## 2021-02-21 DIAGNOSIS — R56.9 UNSPECIFIED CONVULSIONS: ICD-10-CM

## 2021-02-21 DIAGNOSIS — G40.909 EPILEPSY, UNSPECIFIED, NOT INTRACTABLE, WITHOUT STATUS EPILEPTICUS: ICD-10-CM

## 2021-02-21 DIAGNOSIS — Z79.899 OTHER LONG TERM (CURRENT) DRUG THERAPY: ICD-10-CM

## 2021-02-21 LAB
ALBUMIN SERPL ELPH-MCNC: 5 G/DL — SIGNIFICANT CHANGE UP (ref 3.5–5.2)
ALP SERPL-CCNC: 26 U/L — LOW (ref 30–115)
ALT FLD-CCNC: 19 U/L — SIGNIFICANT CHANGE UP (ref 0–41)
ANION GAP SERPL CALC-SCNC: 26 MMOL/L — HIGH (ref 7–14)
AST SERPL-CCNC: 18 U/L — SIGNIFICANT CHANGE UP (ref 0–41)
BASOPHILS # BLD AUTO: 0.02 K/UL — SIGNIFICANT CHANGE UP (ref 0–0.2)
BASOPHILS NFR BLD AUTO: 0.4 % — SIGNIFICANT CHANGE UP (ref 0–1)
BILIRUB SERPL-MCNC: 0.3 MG/DL — SIGNIFICANT CHANGE UP (ref 0.2–1.2)
BUN SERPL-MCNC: 9 MG/DL — LOW (ref 10–20)
CALCIUM SERPL-MCNC: 10.5 MG/DL — HIGH (ref 8.5–10.1)
CHLORIDE SERPL-SCNC: 98 MMOL/L — SIGNIFICANT CHANGE UP (ref 98–110)
CO2 SERPL-SCNC: 14 MMOL/L — LOW (ref 17–32)
CREAT SERPL-MCNC: 1.3 MG/DL — SIGNIFICANT CHANGE UP (ref 0.7–1.5)
EOSINOPHIL # BLD AUTO: 0.02 K/UL — SIGNIFICANT CHANGE UP (ref 0–0.7)
EOSINOPHIL NFR BLD AUTO: 0.4 % — SIGNIFICANT CHANGE UP (ref 0–8)
GLUCOSE SERPL-MCNC: 113 MG/DL — HIGH (ref 70–99)
HCT VFR BLD CALC: 51.2 % — SIGNIFICANT CHANGE UP (ref 42–52)
HGB BLD-MCNC: 16.8 G/DL — SIGNIFICANT CHANGE UP (ref 14–18)
IMM GRANULOCYTES NFR BLD AUTO: 0.8 % — HIGH (ref 0.1–0.3)
LYMPHOCYTES # BLD AUTO: 1.28 K/UL — SIGNIFICANT CHANGE UP (ref 1.2–3.4)
LYMPHOCYTES # BLD AUTO: 26.8 % — SIGNIFICANT CHANGE UP (ref 20.5–51.1)
MCHC RBC-ENTMCNC: 31.3 PG — HIGH (ref 27–31)
MCHC RBC-ENTMCNC: 32.8 G/DL — SIGNIFICANT CHANGE UP (ref 32–37)
MCV RBC AUTO: 95.5 FL — HIGH (ref 80–94)
MONOCYTES # BLD AUTO: 0.31 K/UL — SIGNIFICANT CHANGE UP (ref 0.1–0.6)
MONOCYTES NFR BLD AUTO: 6.5 % — SIGNIFICANT CHANGE UP (ref 1.7–9.3)
NEUTROPHILS # BLD AUTO: 3.11 K/UL — SIGNIFICANT CHANGE UP (ref 1.4–6.5)
NEUTROPHILS NFR BLD AUTO: 65.1 % — SIGNIFICANT CHANGE UP (ref 42.2–75.2)
NRBC # BLD: 0 /100 WBCS — SIGNIFICANT CHANGE UP (ref 0–0)
PLATELET # BLD AUTO: 150 K/UL — SIGNIFICANT CHANGE UP (ref 130–400)
POTASSIUM SERPL-MCNC: 4.2 MMOL/L — SIGNIFICANT CHANGE UP (ref 3.5–5)
POTASSIUM SERPL-SCNC: 4.2 MMOL/L — SIGNIFICANT CHANGE UP (ref 3.5–5)
PROT SERPL-MCNC: 8 G/DL — SIGNIFICANT CHANGE UP (ref 6–8)
RBC # BLD: 5.36 M/UL — SIGNIFICANT CHANGE UP (ref 4.7–6.1)
RBC # FLD: 11.9 % — SIGNIFICANT CHANGE UP (ref 11.5–14.5)
SODIUM SERPL-SCNC: 138 MMOL/L — SIGNIFICANT CHANGE UP (ref 135–146)
VALPROATE SERPL-MCNC: 63 UG/ML — SIGNIFICANT CHANGE UP (ref 50–100)
WBC # BLD: 4.78 K/UL — LOW (ref 4.8–10.8)
WBC # FLD AUTO: 4.78 K/UL — LOW (ref 4.8–10.8)

## 2021-02-21 PROCEDURE — 99285 EMERGENCY DEPT VISIT HI MDM: CPT

## 2021-02-21 PROCEDURE — 70450 CT HEAD/BRAIN W/O DYE: CPT | Mod: 26

## 2021-02-21 RX ORDER — LEVETIRACETAM 250 MG/1
1000 TABLET, FILM COATED ORAL ONCE
Refills: 0 | Status: COMPLETED | OUTPATIENT
Start: 2021-02-21 | End: 2021-02-21

## 2021-02-21 RX ORDER — SODIUM CHLORIDE 9 MG/ML
1000 INJECTION INTRAMUSCULAR; INTRAVENOUS; SUBCUTANEOUS ONCE
Refills: 0 | Status: COMPLETED | OUTPATIENT
Start: 2021-02-21 | End: 2021-02-21

## 2021-02-21 RX ADMIN — SODIUM CHLORIDE 1000 MILLILITER(S): 9 INJECTION INTRAMUSCULAR; INTRAVENOUS; SUBCUTANEOUS at 19:14

## 2021-02-21 RX ADMIN — LEVETIRACETAM 400 MILLIGRAM(S): 250 TABLET, FILM COATED ORAL at 17:10

## 2021-02-21 NOTE — ED PROVIDER NOTE - PROGRESS NOTE DETAILS
Pt sleepy, but more awake; awakes with verbal stimulation, is following 1 step commands, moves all extrem, but still seems sleepy, and is uncooperative. Pt still denies any pain. Will cont to observe. Pt remain sleepy, arouses with physical stim, remains sleepy Pt endorsed to Dr. Harp Pt finished two sandwiches/juice in ED, ambulating well with steady gait, sts wants to leave. will dc back to [facility].

## 2021-02-21 NOTE — ED PROVIDER NOTE - ATTENDING CONTRIBUTION TO CARE
37 y/o M pmh sz d/o on keppra and depakote, schizophrenia, K2 abuse, p/w apparent sz today. outside [address]. According to staff, sz event witnessed by security, who called EMS. Staff reports pt has daily compliance a with sz meds, and also smokes K2 nearly daily.    Pt is sleepy; + abrasions on forehead and nose; cspine non ttp; unlabored respirations, no resp distress; RRR; abd s/nt; Pt refuses further exam; yells to be left alone, moving all extrem.    IMP: sz, postictal? facial/ head trauma  P: labs, cth, ekg, load w/ keppra, reassess.

## 2021-02-21 NOTE — ED PROVIDER NOTE - OBJECTIVE STATEMENT
36 year old M with hx of schizophrenia, bipolar d/o, seizure d/o on keppra/depakote from [facility] for seizure. According to [facility] staff, [facility] and  was witnessed by security who called ems. Sts pt has been compliant with seizure medications and smokes k 2 daily. + abrasion to forehead. Unable to get hx from pt.

## 2021-02-21 NOTE — ED ADULT NURSE NOTE - OBJECTIVE STATEMENT
Pt brought in by EMS seizure. Security at pt residence witnessed seizure. Unknown what happened prior.

## 2021-02-21 NOTE — ED ADULT NURSE NOTE - NSIMPLEMENTINTERV_GEN_ALL_ED
Implemented All Fall with Harm Risk Interventions:  [place] to call system. Call bell, personal items and telephone within reach. Instruct patient to call for assistance. Room bathroom lighting operational. Non-slip footwear when patient is off stretcher. Physically safe environment: no spills, clutter or unnecessary equipment. Stretcher in lowest position, wheels locked, appropriate side rails in place. Provide visual cue, wrist band, yellow gown, etc. Monitor gait and stability. Monitor for mental status changes and reorient to person, place, and time. Review medications for side effects contributing to fall risk. Reinforce activity limits and safety measures with patient and family. Provide visual clues: red socks.

## 2021-02-21 NOTE — ED PROVIDER NOTE - PATIENT PORTAL LINK FT
You can access the FollowMyHealth Patient Portal offered by [facility] by registering at the following website: [email or web address]. By joining [employer]’s FollowMyHealth portal, you will also be able to view your health information using other applications (apps) compatible with our system.

## 2021-02-21 NOTE — ED PROVIDER NOTE - CARE PROVIDER_API CALL
Tawanda Jamison)  Neurology  [address] [place] [address], [address]  [place] [address]  Phone: [phone]  Fax: [phone]  Follow Up Time:

## 2021-02-21 NOTE — ED PROVIDER NOTE - PHYSICAL EXAMINATION
Limited Exam: Pt refusing most of physical exam  CONSTITUTIONAL: Pt is sleepy but arousable, in nad  NECK: No midline c spine ttp   CARDIOVASCULAR: Normal S1, S2; no murmurs, rubs, or gallops.   RESPIRATORY: Normal chest excursion with respiration; breath sounds clear and equal bilaterally; no wheezes, rhonchi, or rales.  GI/: Normal bowel sounds; non-distended; non-tender; no palpable organomegaly.   MS: No evidence of trauma or deformity. Pt moving all extremities well  SKIN: + abrasion to forehead  NEURO/PSYCH: A & O x 2; Pt moving all extrem well. Yelling at staff to leave him tobi

## 2021-02-24 LAB — LEVETIRACETAM SERPL-MCNC: 22.7 UG/ML — SIGNIFICANT CHANGE UP (ref 10–40)

## 2021-03-25 NOTE — ED PROVIDER NOTE - CHILD ABUSE FACILITY
Impression: Combined forms of age-related cataract, bilateral: H25.813. Plan: Cataracts account for the patient's complaints. No treatment currently recommended. The patient will monitor vision changes and contact us with any decrease in vision. [facility]

## 2021-05-08 ENCOUNTER — INPATIENT (INPATIENT)
Facility: HOSPITAL | Age: 36
LOS: 1 days | Discharge: PSYCHIATRIC OR SUBSTANCE USE FACILITY | End: 2021-05-10
Attending: HOSPITALIST | Admitting: HOSPITALIST
Payer: MEDICAID

## 2021-05-08 VITALS
HEART RATE: 89 BPM | RESPIRATION RATE: 18 BRPM | DIASTOLIC BLOOD PRESSURE: 80 MMHG | TEMPERATURE: 98 F | SYSTOLIC BLOOD PRESSURE: 134 MMHG | OXYGEN SATURATION: 99 %

## 2021-05-08 LAB
ALBUMIN SERPL ELPH-MCNC: 4.1 G/DL — SIGNIFICANT CHANGE UP (ref 3.5–5.2)
ALP SERPL-CCNC: 20 U/L — LOW (ref 30–115)
ALT FLD-CCNC: 18 U/L — SIGNIFICANT CHANGE UP (ref 0–41)
ANION GAP SERPL CALC-SCNC: 11 MMOL/L — SIGNIFICANT CHANGE UP (ref 7–14)
APAP SERPL-MCNC: <5 UG/ML — LOW (ref 10–30)
AST SERPL-CCNC: 17 U/L — SIGNIFICANT CHANGE UP (ref 0–41)
BASOPHILS # BLD AUTO: 0.01 K/UL — SIGNIFICANT CHANGE UP (ref 0–0.2)
BASOPHILS NFR BLD AUTO: 0.1 % — SIGNIFICANT CHANGE UP (ref 0–1)
BILIRUB SERPL-MCNC: 0.3 MG/DL — SIGNIFICANT CHANGE UP (ref 0.2–1.2)
BUN SERPL-MCNC: 10 MG/DL — SIGNIFICANT CHANGE UP (ref 10–20)
CALCIUM SERPL-MCNC: 9.2 MG/DL — SIGNIFICANT CHANGE UP (ref 8.5–10.1)
CHLORIDE SERPL-SCNC: 105 MMOL/L — SIGNIFICANT CHANGE UP (ref 98–110)
CO2 SERPL-SCNC: 25 MMOL/L — SIGNIFICANT CHANGE UP (ref 17–32)
CREAT SERPL-MCNC: 1.1 MG/DL — SIGNIFICANT CHANGE UP (ref 0.7–1.5)
EOSINOPHIL # BLD AUTO: 0 K/UL — SIGNIFICANT CHANGE UP (ref 0–0.7)
EOSINOPHIL NFR BLD AUTO: 0 % — SIGNIFICANT CHANGE UP (ref 0–8)
ETHANOL SERPL-MCNC: <10 MG/DL — SIGNIFICANT CHANGE UP
GLUCOSE SERPL-MCNC: 82 MG/DL — SIGNIFICANT CHANGE UP (ref 70–99)
HCT VFR BLD CALC: 44.8 % — SIGNIFICANT CHANGE UP (ref 42–52)
HGB BLD-MCNC: 15.3 G/DL — SIGNIFICANT CHANGE UP (ref 14–18)
IMM GRANULOCYTES NFR BLD AUTO: 0.4 % — HIGH (ref 0.1–0.3)
LYMPHOCYTES # BLD AUTO: 1.6 K/UL — SIGNIFICANT CHANGE UP (ref 1.2–3.4)
LYMPHOCYTES # BLD AUTO: 17.3 % — LOW (ref 20.5–51.1)
MAGNESIUM SERPL-MCNC: 1.9 MG/DL — SIGNIFICANT CHANGE UP (ref 1.8–2.4)
MCHC RBC-ENTMCNC: 31.2 PG — HIGH (ref 27–31)
MCHC RBC-ENTMCNC: 34.2 G/DL — SIGNIFICANT CHANGE UP (ref 32–37)
MCV RBC AUTO: 91.4 FL — SIGNIFICANT CHANGE UP (ref 80–94)
MONOCYTES # BLD AUTO: 0.71 K/UL — HIGH (ref 0.1–0.6)
MONOCYTES NFR BLD AUTO: 7.7 % — SIGNIFICANT CHANGE UP (ref 1.7–9.3)
NEUTROPHILS # BLD AUTO: 6.9 K/UL — HIGH (ref 1.4–6.5)
NEUTROPHILS NFR BLD AUTO: 74.5 % — SIGNIFICANT CHANGE UP (ref 42.2–75.2)
NRBC # BLD: 0 /100 WBCS — SIGNIFICANT CHANGE UP (ref 0–0)
PLATELET # BLD AUTO: 157 K/UL — SIGNIFICANT CHANGE UP (ref 130–400)
POTASSIUM SERPL-MCNC: 4.4 MMOL/L — SIGNIFICANT CHANGE UP (ref 3.5–5)
POTASSIUM SERPL-SCNC: 4.4 MMOL/L — SIGNIFICANT CHANGE UP (ref 3.5–5)
PROT SERPL-MCNC: 6.4 G/DL — SIGNIFICANT CHANGE UP (ref 6–8)
RBC # BLD: 4.9 M/UL — SIGNIFICANT CHANGE UP (ref 4.7–6.1)
RBC # FLD: 11.8 % — SIGNIFICANT CHANGE UP (ref 11.5–14.5)
SALICYLATES SERPL-MCNC: <0.3 MG/DL — LOW (ref 4–30)
SARS-COV-2 RNA SPEC QL NAA+PROBE: SIGNIFICANT CHANGE UP
SODIUM SERPL-SCNC: 141 MMOL/L — SIGNIFICANT CHANGE UP (ref 135–146)
VALPROATE SERPL-MCNC: 61 UG/ML — SIGNIFICANT CHANGE UP (ref 50–100)
WBC # BLD: 9.26 K/UL — SIGNIFICANT CHANGE UP (ref 4.8–10.8)
WBC # FLD AUTO: 9.26 K/UL — SIGNIFICANT CHANGE UP (ref 4.8–10.8)

## 2021-05-08 PROCEDURE — 70450 CT HEAD/BRAIN W/O DYE: CPT | Mod: 26,MA

## 2021-05-08 PROCEDURE — 99285 EMERGENCY DEPT VISIT HI MDM: CPT

## 2021-05-08 PROCEDURE — 93010 ELECTROCARDIOGRAM REPORT: CPT

## 2021-05-08 RX ORDER — SODIUM CHLORIDE 9 MG/ML
1000 INJECTION INTRAMUSCULAR; INTRAVENOUS; SUBCUTANEOUS ONCE
Refills: 0 | Status: DISCONTINUED | OUTPATIENT
Start: 2021-05-08 | End: 2021-05-09

## 2021-05-08 RX ORDER — HALOPERIDOL DECANOATE 100 MG/ML
5 INJECTION INTRAMUSCULAR ONCE
Refills: 0 | Status: COMPLETED | OUTPATIENT
Start: 2021-05-08 | End: 2021-05-08

## 2021-05-08 RX ORDER — LEVETIRACETAM 250 MG/1
1000 TABLET, FILM COATED ORAL ONCE
Refills: 0 | Status: COMPLETED | OUTPATIENT
Start: 2021-05-08 | End: 2021-05-08

## 2021-05-08 RX ORDER — TETANUS TOXOID, REDUCED DIPHTHERIA TOXOID AND ACELLULAR PERTUSSIS VACCINE, ADSORBED 5; 2.5; 8; 8; 2.5 [IU]/.5ML; [IU]/.5ML; UG/.5ML; UG/.5ML; UG/.5ML
0.5 SUSPENSION INTRAMUSCULAR ONCE
Refills: 0 | Status: COMPLETED | OUTPATIENT
Start: 2021-05-08 | End: 2021-05-08

## 2021-05-08 RX ADMIN — HALOPERIDOL DECANOATE 5 MILLIGRAM(S): 100 INJECTION INTRAMUSCULAR at 17:36

## 2021-05-08 RX ADMIN — LEVETIRACETAM 400 MILLIGRAM(S): 250 TABLET, FILM COATED ORAL at 21:00

## 2021-05-08 RX ADMIN — TETANUS TOXOID, REDUCED DIPHTHERIA TOXOID AND ACELLULAR PERTUSSIS VACCINE, ADSORBED 0.5 MILLILITER(S): 5; 2.5; 8; 8; 2.5 SUSPENSION INTRAMUSCULAR at 18:22

## 2021-05-08 RX ADMIN — Medication 2 MILLIGRAM(S): at 18:21

## 2021-05-08 RX ADMIN — LEVETIRACETAM 1000 MILLIGRAM(S): 250 TABLET, FILM COATED ORAL at 21:25

## 2021-05-08 RX ADMIN — Medication 2 MILLIGRAM(S): at 23:54

## 2021-05-08 NOTE — ED PROVIDER NOTE - ATTENDING CONTRIBUTION TO CARE
35 y/o M PMH schizophrenia, mood d/o (?), sz d/o on keppra, here for eval sz today. Unclear hpi; pt poor historian.    PE: Pt found sleeping, arouses to voice; NAD; + abrasions to L face/ forehead; PERRL; neck supple; No resp distress; abd s/nt; + abrasions to b/l hands;  extrem non ttp; Pt is yelling asking to be left alone, is not cooperative with exam.    I tried multiple attempts to verbally deescalate pt- unsuccessful. Given pt's head trauma and concern for injury, pt was sedated with IM haldol and ativan, and physically restrained in order to facilitate w/up.     Can place finger under restraints; Pt monitored w/o incident or complication.    Pt got tdap. CTH neg. NL glucose and Na and labs.  Pt remains sedate.  Pt endorsed to Dr. Rachel for further observation and assessment.

## 2021-05-08 NOTE — ED ADULT NURSE NOTE - OBJECTIVE STATEMENT
Hematology/Oncology  Progress Note    Name: Tiffany Donald  Date: 2020  : 1958    Cuate Birminghma MD     Ms. Lathan Mcardle is a 60y.o. year old female who seen for management of her myelodysplastic syndrome/refractory anemia    Current therapy: Iron supplement, Procrit or Aranesp in the past when her hemoglobin was below 10g/dL and- hematocrit is below 30%    Subjective:     Ms. Lathan Mcardle is a 70-year-old -American woman with history of myelodysplastic syndrome/refractory anemia. She was diagnosed more than 7 years ago. Today she states she has been doing well since her last office visit. She reports her arthritis has been manageable using diclofenac. She denies fatigue, shortness of breath, and weakness. The patient otherwise has no other complaints. Denied fever, chills, night sweat, unintentional weight loss, skin lumps or bumps, acute bleeding or bruising issues. Denied headache, acute vision change, dizziness, chest pain, worsen shortness of breath, palpitation, productive cough, nausea, vomiting, abdominal pain, altered bowel habits, dysuria, new bone pain or back pain, focal numbness or weakness. Past medical history, family history, and social history: these were reviewed and remains unchanged. Past Medical History:   Diagnosis Date    Echocardiogram 2011    Tech difficult. EF 60-65%. Mild LVH. RVSP 20-25 mmHg.       Essential hypertension     History of colon polyps     Hx of endometriosis     had hyst    Hyperlipidemia     Hypertension     MDS (myelodysplastic syndrome) (HCC)     Refractory anemia (HCC)      Past Surgical History:   Procedure Laterality Date    HX HYSTERECTOMY       Social History     Socioeconomic History    Marital status: SINGLE     Spouse name: Not on file    Number of children: Not on file    Years of education: Not on file    Highest education level: Not on file   Occupational History    Not on file   Social Needs    Financial resource strain: Not on file    Food insecurity     Worry: Not on file     Inability: Not on file    Transportation needs     Medical: Not on file     Non-medical: Not on file   Tobacco Use    Smoking status: Never Smoker    Smokeless tobacco: Never Used   Substance and Sexual Activity    Alcohol use: No    Drug use: No    Sexual activity: Not on file   Lifestyle    Physical activity     Days per week: Not on file     Minutes per session: Not on file    Stress: Not on file   Relationships    Social connections     Talks on phone: Not on file     Gets together: Not on file     Attends Cheondoism service: Not on file     Active member of club or organization: Not on file     Attends meetings of clubs or organizations: Not on file     Relationship status: Not on file    Intimate partner violence     Fear of current or ex partner: Not on file     Emotionally abused: Not on file     Physically abused: Not on file     Forced sexual activity: Not on file   Other Topics Concern    Not on file   Social History Narrative    Not on file     Family History   Problem Relation Age of Onset    Cancer Mother     Arthritis-rheumatoid Sister     Diabetes Sister     Hypertension Sister     No Known Problems Father      Current Outpatient Medications   Medication Sig Dispense Refill    diclofenac EC (VOLTAREN) 75 mg EC tablet TAKE 1 TABLET BY MOUTH ONCE DAILY AS NEEDED WITH FOOD      polyethylene glycol (MIRALAX) 17 gram packet Take 1 Packet by mouth daily. 517 g 12    carvediloL (COREG) 25 mg tablet TAKE ONE TABLET BY MOUTH TWICE DAILY WITH MEALS 180 Tab 3    Vitamin D2 1,250 mcg (50,000 unit) capsule Take 1 capsule by mouth once a week 4 Cap 12    losartan-hydroCHLOROthiazide (HYZAAR) 100-12.5 mg per tablet TAKE 1 TABLET BY MOUTH ONCE DAILY 30 Tab 11    valACYclovir (VALTREX) 500 mg tablet Take 1 tablet by mouth twice daily 30 Tab 12    loratadine (CLARITIN) 10 mg tablet Take 1 Tab by mouth daily.  30 Tab 5    simvastatin (ZOCOR) 20 mg tablet TAKE 1 TABLET BY MOUTH ONCE DAILY AT BEDTIME 30 Tab 12    diclofenac (VOLTAREN) 1 % gel Apply 4 g to affected area four (4) times daily. 400 g 12    polyethylene glycol (MIRALAX) 17 gram/dose powder MIX 17 GRAMS (1 CAPFUL) IN LIQUID AND DRINK BY MOUTH ONCE DAILY FOR CONSTIPATION 517 g 12    latanoprost (XALATAN) 0.005 % ophthalmic solution Administer 1 Drop to both eyes nightly. Review of Systems   Constitutional: Negative. HENT: Negative. Eyes: Negative. Respiratory: Negative. Cardiovascular: Negative. Gastrointestinal: Negative. Genitourinary: Negative. Musculoskeletal: Negative. Skin: Negative. Neurological: Negative. Endo/Heme/Allergies: Negative. Psychiatric/Behavioral: Negative. Objective:     Visit Vitals  BP (!) 144/83   Pulse 72   Resp 18   Ht 5' 3\" (1.6 m)   Wt 87.5 kg (193 lb)   LMP 08/31/1998   SpO2 99%   BMI 34.19 kg/m²     ECOG PS0  Physical Exam  HENT:      Head: Normocephalic. Eyes:      Pupils: Pupils are equal, round, and reactive to light. Neck:      Musculoskeletal: Normal range of motion. Cardiovascular:      Rate and Rhythm: Normal rate. Pulmonary:      Effort: Pulmonary effort is normal.   Abdominal:      Palpations: Abdomen is soft. Musculoskeletal: Normal range of motion. Skin:     General: Skin is warm. Neurological:      Mental Status: She is alert and oriented to person, place, and time. Psychiatric:         Mood and Affect: Mood normal.         Behavior: Behavior normal.         Thought Content:  Thought content normal.         Judgment: Judgment normal.          Lab data:      Results for orders placed or performed during the hospital encounter of 12/02/19   CBC WITH 3 PART DIFF     Status: Abnormal   Result Value Ref Range Status    WBC 5.3 4.5 - 13.0 K/uL Final    RBC 3.41 (L) 4.10 - 5.10 M/uL Final    HGB 10.0 (L) 12.0 - 16 g/dL Final    HCT 31.1 (L) 36 - 48 % Final    MCV 91.2 78 - 102 FL Final    MCH 29.3 25.0 - 35.0 PG Final    MCHC 32.2 31 - 37 g/dL Final    RDW 12.9 11.5 - 14.5 % Final    PLATELET 933 236 - 624 K/uL Final    NEUTROPHILS 59 40 - 70 % Final    MIXED CELLS 7 0.1 - 17 % Final    LYMPHOCYTES 34 14 - 44 % Final    ABS. NEUTROPHILS 3.1 1.8 - 9.5 K/UL Final    ABS. MIXED CELLS 0.4 0.0 - 2.3 K/uL Final    ABS. LYMPHOCYTES 1.8 1.1 - 5.9 K/UL Final     Comment: Test performed at [address] [place] [address] or Outpatient Infusion Center Location. Reviewed by Medical Director. DF AUTOMATED   Final           Assessment:     1. MDS (myelodysplastic syndrome) ([facility] [address]    2. Anemia, unspecified type    3. Myelodysplastic syndrome ([facility] [address]    4. Refractory anemia (HCC)    5. Vitamin D deficiency          Plan:   Myelodysplastic syndrome/ Chronic Anemia:   -- She has a presumably history of myelodysplastic syndrome/refractory anemia. She was diagnosed more than 7 years ago. -- Her H/H appears doing stable for years without thrombocytopenia or leukopenia/leukocytosis. It was unclear if a true MDS in her cases. -- We will continue to monitor CBC periodically  --- CBC, CMP, iron and ferritin reviewed with patient today. .      Arthritis/facet arthritis of the cervical region:   Vitamin D deficiency  -- The patient will follow her PCP for further management.         -- We will see the patient back in clinic in about 4 months. Always sooner if required. The patient can have lab done prior our next clinic visit. No orders of the defined types were placed in this encounter. Kate Suarez NP  12/9/2020      Please note: This document has been produced using voice recognition software. Unrecognized errors in transcription may be present. Patient presents to ED from [facility] s/p seizure x2 as per EMS. Patient refusing to answer any health related questions.

## 2021-05-08 NOTE — ED ADULT NURSE REASSESSMENT NOTE - NS ED NURSE REASSESS COMMENT FT1
Assumed care from previous primary day shift nurse c/o s/p seizure , no seizure episode this time , pt resting comfortably on bed , asleep , chest rise symmetrical , arouses to voice , HR 50s while sleeping , 60s to 80s when stimulated , MD aware of pts low HR on 50s , pt drowsy , pulse ox checked 100 % sat , no respiratory distress noted , , calm  , no agitation noted , no restlessness noted  this time , noted bilateral hand abrasion , noted forehead/eyebrow abrasion  , on continuos cardiac monitor , on 4 point restraints , PCA at the bedside, no seizure episode , no syncopal episode

## 2021-05-08 NOTE — ED PROVIDER NOTE - PHYSICAL EXAMINATION
CONST: Pt sleeping, arousal to pain  EYES: PERRL, EOMI, Sclera and conjunctiva clear.   ENT: No nasal discharge. Oropharynx normal appearing, no erythema or exudates. No abscess or swelling. Uvula midline.   NECK: Non-tender, no meningeal signs. normal ROM. supple   CARD: Normal S1 S2; Normal rate and rhythm  RESP: Equal BS B/L, No wheezes, rhonchi or rales. No distress  GI: Soft, non-tender, non-distended. no cva tenderness. normal BS  MS: Normal ROM in all extremities. No midline spinal tenderness. pulses 2 +. no calf tenderness or swelling  SKIN: abrasion to right forehead and eyebrow. no lacerations. Warm, dry, no acute rashes. Good turgor  NEURO: A&Ox3, No focal deficits. Strength 5/5 with no sensory deficits.   PSYCH: Uncooperative

## 2021-05-08 NOTE — ED PROVIDER NOTE - CLINICAL SUMMARY MEDICAL DECISION MAKING FREE TEXT BOX
Patient remained stable in ED, sleeping comfortably on the bed, 1:1 is bedside. Patient got up to use bathroom earlier and he was agitated again, but was able to redirected. Discussed with neurology, recommended admission to [facility], and they will follow up patient.

## 2021-05-08 NOTE — ED ADULT TRIAGE NOTE - CHIEF COMPLAINT QUOTE
Pt had witnessed seizures x 2 - fell on left side with abrasions to left side of face and arms.  Pt has hx of seizures. Alert and oriented x 2-3

## 2021-05-08 NOTE — ED PROVIDER NOTE - PROGRESS NOTE DETAILS
Patient remained stable in ED, sleeping comfortably on the bed, 1:1 is bedside. Patient got up to use bathroom earlier and he was agitated again, but was able to redirected. Discussed with neurology, recommended admission to [facility], and they will follow up patient.

## 2021-05-08 NOTE — ED ADULT NURSE REASSESSMENT NOTE - NS ED NURSE REASSESS COMMENT FT1
Patient refusing lab work and cardiac monitoring, patient educated about risk os refusing lab work and cardiac monitoring but patient still refusing. ISRAEL Alonso notified. Will continue to monitor patient.

## 2021-05-09 DIAGNOSIS — F25.9 SCHIZOAFFECTIVE DISORDER, UNSPECIFIED: ICD-10-CM

## 2021-05-09 LAB
HCT VFR BLD CALC: 45.5 % — SIGNIFICANT CHANGE UP (ref 42–52)
HGB BLD-MCNC: 15.9 G/DL — SIGNIFICANT CHANGE UP (ref 14–18)
LACTATE SERPL-SCNC: 1.5 MMOL/L — SIGNIFICANT CHANGE UP (ref 0.7–2)
MCHC RBC-ENTMCNC: 31.9 PG — HIGH (ref 27–31)
MCHC RBC-ENTMCNC: 34.9 G/DL — SIGNIFICANT CHANGE UP (ref 32–37)
MCV RBC AUTO: 91.4 FL — SIGNIFICANT CHANGE UP (ref 80–94)
NRBC # BLD: 0 /100 WBCS — SIGNIFICANT CHANGE UP (ref 0–0)
PLATELET # BLD AUTO: 155 K/UL — SIGNIFICANT CHANGE UP (ref 130–400)
RBC # BLD: 4.98 M/UL — SIGNIFICANT CHANGE UP (ref 4.7–6.1)
RBC # FLD: 11.9 % — SIGNIFICANT CHANGE UP (ref 11.5–14.5)
WBC # BLD: 7.83 K/UL — SIGNIFICANT CHANGE UP (ref 4.8–10.8)
WBC # FLD AUTO: 7.83 K/UL — SIGNIFICANT CHANGE UP (ref 4.8–10.8)

## 2021-05-09 PROCEDURE — 93010 ELECTROCARDIOGRAM REPORT: CPT | Mod: 76

## 2021-05-09 PROCEDURE — 99222 1ST HOSP IP/OBS MODERATE 55: CPT

## 2021-05-09 PROCEDURE — 99223 1ST HOSP IP/OBS HIGH 75: CPT

## 2021-05-09 RX ORDER — DIVALPROEX SODIUM 500 MG/1
500 TABLET, DELAYED RELEASE ORAL
Refills: 0 | Status: DISCONTINUED | OUTPATIENT
Start: 2021-05-09 | End: 2021-05-10

## 2021-05-09 RX ORDER — LEVETIRACETAM 250 MG/1
1500 TABLET, FILM COATED ORAL AT BEDTIME
Refills: 0 | Status: DISCONTINUED | OUTPATIENT
Start: 2021-05-09 | End: 2021-05-10

## 2021-05-09 RX ORDER — LEVETIRACETAM 250 MG/1
1000 TABLET, FILM COATED ORAL
Refills: 0 | Status: DISCONTINUED | OUTPATIENT
Start: 2021-05-09 | End: 2021-05-10

## 2021-05-09 RX ORDER — DIVALPROEX SODIUM 500 MG/1
1000 TABLET, DELAYED RELEASE ORAL AT BEDTIME
Refills: 0 | Status: DISCONTINUED | OUTPATIENT
Start: 2021-05-09 | End: 2021-05-10

## 2021-05-09 RX ORDER — HALOPERIDOL DECANOATE 100 MG/ML
2 INJECTION INTRAMUSCULAR EVERY 4 HOURS
Refills: 0 | Status: DISCONTINUED | OUTPATIENT
Start: 2021-05-09 | End: 2021-05-09

## 2021-05-09 RX ORDER — ENOXAPARIN SODIUM 100 MG/ML
40 INJECTION SUBCUTANEOUS AT BEDTIME
Refills: 0 | Status: DISCONTINUED | OUTPATIENT
Start: 2021-05-09 | End: 2021-05-10

## 2021-05-09 RX ADMIN — ENOXAPARIN SODIUM 40 MILLIGRAM(S): 100 INJECTION SUBCUTANEOUS at 22:39

## 2021-05-09 RX ADMIN — LEVETIRACETAM 1000 MILLIGRAM(S): 250 TABLET, FILM COATED ORAL at 05:26

## 2021-05-09 RX ADMIN — LEVETIRACETAM 1500 MILLIGRAM(S): 250 TABLET, FILM COATED ORAL at 22:40

## 2021-05-09 RX ADMIN — DIVALPROEX SODIUM 500 MILLIGRAM(S): 500 TABLET, DELAYED RELEASE ORAL at 05:26

## 2021-05-09 RX ADMIN — DIVALPROEX SODIUM 1000 MILLIGRAM(S): 500 TABLET, DELAYED RELEASE ORAL at 22:40

## 2021-05-09 RX ADMIN — Medication 2 MILLIGRAM(S): at 17:07

## 2021-05-09 NOTE — H&P ADULT - HISTORY OF PRESENT ILLNESS
37 Yo M with PMH of Schizoprhenia, Adjustment Disorder, Epilepsy, Anxiety Disorder AURA EMS from [facility] s/p seizure and head trauma. Patient is on 4 point restraint upon my interview, but cooperative to questioning. He states that he fell this afternoon, (cannot remember details preceeding fall, or how he fell). He states "I just fell". Patient states that he woke up with people around him and was brought to the hospital. Per Psych facility report, patient had a seizure around 3 pm this afternoon which lasted approximately four mins, he was oted to have an abrasion above his right forehead and had an unwitnessed fall.  37 Yo M with PMH of Schizoprhenia, Adjustment Disorder, Epilepsy, Anxiety Disorder AURA EMS from [facility] s/p seizure and head trauma. Patient is on 4 point restraint upon my interview, but cooperative to questioning. He states that he fell this afternoon, (cannot remember details preceeding fall, or how he fell). He states "I just fell". Patient states that he woke up with people around him and was brought to the hospital. Per Psych facility report, patient had a seizure around 3 pm this afternoon which lasted approximately four mins, (he was found on the floor by staff, leaning on his left side), noted to have an abrasion above his right forehead and had an unwitnessed fall. Unable to reach staff members at facility overnight, but per nurse on call, patient received all of his seizure medications on timely manner and otherwise at his usual state of health. No reported hx of recent illness, medication changes, chest pain, palpitations, or focal deficits.     ED course: BP: 128/72, HR: 78, T: 98.2, SPO2 98% on room air. No acute electrolyte abnormalities. FS normal. CT head negative for hemorrhage/cva, s/p 1 dose of haldol and ativan for agitation in ED, placed on 4 point restraint and 1:1 observation

## 2021-05-09 NOTE — BEHAVIORAL HEALTH ASSESSMENT NOTE - NSBHREFERDETAILS_PSY_A_CORE_FT
37 yo with schizoaffective bipolar disorder, anxiety from [facility] for suspected seizure with continued agitation, unlike post ictal episode

## 2021-05-09 NOTE — H&P ADULT - ATTENDING COMMENTS
***Hx and physical limited due to agitation. Supplemental information obtained from house staff, NH chart, and EMR.     37 YO M with a PMH of Schizophrenia, Adjustment Disorder, Epilepsy, and Anxiety Disorder who was BIBEMS from [facility] for eval of head trauma after having a witnessed seizure. ROS unable to obtain.     In the ED, placed on 4 point restraints for agitation and given Haldol 5 mg with Ativan 2 mg IV push (twice). CTH was negative for acute process. Pt Keppra loaded in the ED. Neuro saw the pt and wants to c/w depakote, keppra, and to send AED levels.     Physical exam shows pt who is very agitated in four point restraints. VSS, afebrile, not hypoxic on RA. A&Ox2. Not following commands but spontaneously moves all extremities. Muscle strength/sensation intact. Rest of exam deferred by pt. Labs and radiology as above.     Agitation. Pt currently in 4 point restraints, try and deescalate. Haldol/Ativan PRN. QTc 410    Seizure, witnessed. Neurology is following. Keppra loaded in the ED. Restart Keppra/Depakote. Ativan PRN. AED levels. EEG. Restart home meds. Seizure precautions.    Bradycardia? Pt noted to be bradycardic on tele while sleeping. Now HR is in the 70's. Repeat EKG with NSR (HR 71), QTc of 410. Discontinue tele.     Hx of Schizophrenia, Adjustment Disorder, Epilepsy, and Anxiety Disorder. Restart home meds, except as stated above. DVT PPX. Inform PCP of pt's admission to hospital. My note supersedes the residents note.

## 2021-05-09 NOTE — BEHAVIORAL HEALTH ASSESSMENT NOTE - NS ED BHA MSE GENERAL APPEARANCE
Pt walked off unit  Discharge instructions given to pt  Verbal understanding of same  Pt in no acute distress 
No deformities present

## 2021-05-09 NOTE — BEHAVIORAL HEALTH ASSESSMENT NOTE - SUMMARY
Pt is a 36 year old AAF male, domiciled at [facility], with PMH of Seizure disorder (active medications) and PPHx of Schizoaffective D/O bipolar type vs Schizophrenia, and Anxiety Disorder who was admitted s/p seizure and head trauma that occurred at his residence. Psychiatry consulted for agitation and mental health evaluation.     Patient uncooperative with attempts at assessment. Prior to provider arriving, patient was cause of multiple code gray’s for aggression/agitation in context of wanting to leave AMA. Patient was eventually given Ativan 2 mg IV and put in wrist restraints. Recommend continue medical workup with agitation recs as documented below. Psychiatry team to follow tomorrow.     Plan:   -Recommend continue medical workup including plans per neurology   -Recommend U tox screen (include THC)   -For agitation can use Ativan 2mg PRN q 6 PO  ? escalate to IM/IV as needed.   -Would avoid PRN antipsychotics if possible given they may lower seizure threshold and patient is on YADAV.   -Psychiatry team to follow and complete assessment if patient is more cooperative during the day

## 2021-05-09 NOTE — BEHAVIORAL HEALTH ASSESSMENT NOTE - NSBHCHARTREVIEWVS_PSY_A_CORE FT
ICU Vital Signs Last 24 Hrs  T(C): 36.8 (09 May 2021 18:42), Max: 36.8 (09 May 2021 18:42)  T(F): 98.3 (09 May 2021 18:42), Max: 98.3 (09 May 2021 18:42)  HR: 56 (09 May 2021 18:42) (50 - 78)  BP: 110/60 (09 May 2021 18:42) (107/64 - 128/72)  BP(mean): 86 (08 May 2021 22:30) (81 - 86)  ABP: --  ABP(mean): --  RR: 16 (09 May 2021 18:42) (16 - 18)  SpO2: 99% (08 May 2021 23:02) (99% - 100%)

## 2021-05-09 NOTE — CHART NOTE - NSCHARTNOTEFT_GEN_A_CORE
1. [facility]  - Mr Whaley is a 36 year old male patient with PMH of Schizoaffective and Bipolar Disorder, Adjustment Disorder, Epilepsy, Anxiety Disorder who was brought to us by EMS from [facility] s/p seizure and head trauma.   - Patient is currently insisting on discharge (standing in hallway and threatening MD) despite the fact that workup is still pending and that he is still requiring 1:1 observation.  - Since patient comes from [facility] and since he doesn't seem to have Capacity, we cannot discharge patient against medical advice  - Psychiatry Team will be contacted now  - Code Espinal will have to be called if patient gets more agitated/violent  - Might have to administer Ativan (on Ativan PRN for agitation)  - Plan discussed with Dr Dale 1. [facility]  - Mr Whaley is a 36 year old male patient with PMH of Schizoaffective and Bipolar Disorder, Adjustment Disorder, Epilepsy, Anxiety Disorder who was brought to us by EMS from [facility] s/p seizure and head trauma.   - Patient is currently insisting on discharge (standing in hallway and threatening MD) despite the fact that workup is still pending and that he is still requiring 1:1 observation.  - Since patient comes from [facility] and since he doesn't seem to have Capacity, we cannot discharge patient against medical advice  - Psychiatry Team will be contacted now  - Emergency Contact Not answering  - Code Teddy will have to be called if patient gets more agitated/violent  - Might have to administer Ativan (on Ativan PRN for agitation)  - Plan discussed with Dr Dale 1. [facility]  - Mr Whaley is a 36 year old male patient with PMH of Schizoaffective and Bipolar Disorder, Adjustment Disorder, Epilepsy, Anxiety Disorder who was brought to us by EMS from [facility] s/p seizure and head trauma.   - Patient is currently insisting on discharge (standing in hallway, screaming, and threatening MD) despite the fact that workup is still pending and that he is still requiring 1:1 observation.  - Since patient comes from [facility] and since he doesn't seem to have Capacity, we cannot discharge patient against medical advice  - Mert Espinal was called around 3 times since patient would become more agitated/violent at times  - S/P IV Ativan 2mg x1 dose for agitation (on Ativan PRN for agitation)  - Had to place bilateral wrist restraints after 3rd Code Grey  - Psychiatry Team was contacted and they will follow up in AM  - Emergency Contact Not answering  - Plan discussed with Dr Dale 1. [facility]  - Mr Whaley is a 36 year old male patient with PMH of Schizoaffective and Bipolar Disorder, Adjustment Disorder, Epilepsy, Anxiety Disorder who was brought to us by EMS from [facility] s/p seizure and head trauma.   - Patient is currently insisting on discharge (standing in hallway, screaming, and threatening MD) despite the fact that workup is still pending and that he is still requiring 1:1 observation.  - Since patient comes from [facility] and since he doesn't seem to have Capacity, we cannot discharge patient against medical advice  - Mert Espinal was called around 3 times since patient would become more agitated at times  - S/P IV Ativan 2mg x1 dose for agitation (on Ativan PRN for agitation)  - Had to place bilateral wrist restraints after 3rd Code Grey  - Psychiatry Team was contacted and they will follow up in AM  - Emergency Contact Not answering  - Plan discussed with Dr Dale 1. [facility]  - Mr Whaley is a 36 year old male patient with PMH of Schizoaffective and Bipolar Disorder, Adjustment Disorder, Epilepsy, Anxiety Disorder who was brought to us by EMS from [facility] s/p seizure and head trauma.   - Patient is currently insisting on discharge (standing in hallway, screaming, and threatening MD) despite the fact that workup is still pending and that he is still requiring 1:1 observation.  - Since patient comes from [facility] and since he doesn't seem to have Capacity, we cannot discharge patient against medical advice  - Mert Espinal was called around 3 times since patient would become more agitated at times  - S/P IV Ativan 2mg x1 dose for agitation (on Ativan PRN for agitation)  - Had to place bilateral wrist restraints after 3rd Code Grey  - Psychiatry Team was contacted and they will follow up in AM  - Emergency Contact from [facility] Not answering  - Plan discussed with Dr Dale

## 2021-05-09 NOTE — BEHAVIORAL HEALTH ASSESSMENT NOTE - NSBHCHARTREVIEWLAB_PSY_A_CORE FT
Comprehensive Metabolic Panel (05.08.21 @ 18:20)    Sodium, Serum: 141 mmol/L    Potassium, Serum: 4.4 mmol/L    Chloride, Serum: 105 mmol/L    Carbon Dioxide, Serum: 25 mmol/L    Anion Gap, Serum: 11 mmol/L    Blood Urea Nitrogen, Serum: 10 mg/dL    Creatinine, Serum: 1.1 mg/dL    Glucose, Serum: 82 mg/dL    Calcium, Total Serum: 9.2 mg/dL    Protein Total, Serum: 6.4 g/dL    Albumin, Serum: 4.1 g/dL    Bilirubin Total, Serum: 0.3 mg/dL    Alkaline Phosphatase, Serum: 20 U/L    Aspartate Aminotransferase (AST/SGOT): 17 U/L    Alanine Aminotransferase (ALT/SGPT): 18 U/L    eGFR if Non : 85: Interpretative comment  The units for eGFR are mL/min/1.73M2 (normalized body surface area). The  eGFR is calculated from a serum creatinine using the CKD-EPI equation.  Other variables required for calculation are race, age and sex. Among  patients with chronic kidney disease (CKD), the eGFR is useful in  determining the stage of disease according to KDOQI CKD classification.  All eGFR results are reported numerically with the following  interpretation.          GFR                    With                 Without     (ml/min/1.73 m2)    Kidney Damage       Kidney Damage        >= 90                    Stage 1                     Normal        60-89                    Stage 2                     Decreased GFR        30-59     Stage 3                     Stage 3        15-29                    Stage 4                     Stage 4        < 15                      Stage 5                     Stage 5  Each stage of CKD assumes that the associated GFR level has been in  effect for at least 3 months. Determination of stages one and two (with  eGFR > 59 ml/min/m2) requires estimation of kidney damage for at least 3  months as defined by structural or functional abnormalities.  Limitations: All estimates of GFR will be less accurate for patients at  extremes of muscle mass (including but not limited to frail elderly,  critically ill, or cancer patients), those with unusual diets, and those  with conditions associated with reduced secretion or extrarenal  elimination of creatinine. The eGFR equation is not recommended for use  in patients with unstable creatinine levels. mL/min/1.73M2    eGFR if African American: 99 mL/min/1.73M2

## 2021-05-09 NOTE — H&P ADULT - ASSESSMENT
37 Yo M with PMH of Schizoaffective and Bipolar Disorder, Adjustment Disorder, Epilepsy, Anxiety Disorder AURA EMS from [facility] s/p seizure and head trauma.     Unwitnessed fall likely 2/2 Breakthrough Seizure   Hx of Epilepsy  - patient currently hemodynamically stable, no acute electrolyte abnormality, patient currently on 4 point restraints but cooperative to questioning, restraints will be deescalated.   - CT head with no acute intracranial pathology  - Neurology evaluation  - routine EEG  - f/u Keppra levels, check CK levels  - s/p 1 g Keppra load in ED, continue with Keppra 1500 mg at HS, and Keppra 1000 mg am  - c/w Divalproex 500 mg in am and 1000 mg at bedtime  - continue 1:1 observation, avoid haldol and ativan unless patient is combative    Schizoaffective and Bipolar Disorder  - patient on Risperdal 37.5/ 2ml injection IM every 2 weeks (last dose given per chart is 4/21), patient was due for dose on 5/4, please confirm in am  - avoid Haldol unless severely agitated, psychiatry eval  - f/u EKG in am, monitor for QT prolongation    Code: Full  Diet: Regular  DVT ppx; Lovenox  Dispo: acute    37 Yo M with PMH of Schizoaffective and Bipolar Disorder, Adjustment Disorder, Epilepsy, Anxiety Disorder AURA EMS from [facility] s/p seizure and head trauma.     Unwitnessed fall likely 2/2 Breakthrough Seizure   Hx of Epilepsy  - patient currently hemodynamically stable, no acute electrolyte abnormality, patient currently on 4 point restraints but cooperative to questioning, restraints will be deescalated.   - f/u EKG  - CT head with no acute intracranial pathology  - Neurology evaluation  - routine EEG  - f/u Keppra levels, check CK levels  - s/p 1 g Keppra load in ED, continue with Keppra 1500 mg at HS, and Keppra 1000 mg am  - c/w Divalproex 500 mg in am and 1000 mg at bedtime  - continue 1:1 observation, avoid haldol and ativan unless patient is combative    Schizoaffective and Bipolar Disorder  - patient on Risperdal 37.5/ 2ml injection IM every 2 weeks (last dose given per chart is 4/21), patient was due for dose on 5/4, please confirm in am  - avoid Haldol unless severely agitated, psychiatry eval  - f/u EKG in am, monitor for QT prolongation    Code: Full  Diet: Regular  DVT ppx; Lovenox  Dispo: acute

## 2021-05-09 NOTE — BEHAVIORAL HEALTH ASSESSMENT NOTE - HPI (INCLUDE ILLNESS QUALITY, SEVERITY, DURATION, TIMING, CONTEXT, MODIFYING FACTORS, ASSOCIATED SIGNS AND SYMPTOMS)
Pt is a 36 year old AAF male, domiciled at [facility], with PMH of Seizure disorder (active medications) and PPHx of Schizoaffective D/O bipolar type vs Schizophrenia, and Anxiety Disorder who was admitted s/p seizure and head trauma that occurred at his residence. Psychiatry consulted for agitation and mental health evaluation.     Prior to evaluation, patient required multiple code greys. As per medical team, patient was demanding to be discharged, screaming and aggressive in the hallway. Patient could not be verbally deescalated. Patient received Ativan 2mg IV x one dose. He was also placed in bilateral wrists restraints.   Of note, patient had similar episode when he first arrived to ED. Pt was sedated with IM haldol and ativan, and physically restrained in order to facilitate w/up.    On approach, patient seen sitting lying in hospital bed with covers wrapped around his head. Per 1:1 sit, patient had finally calmed down about 30 minutes ago. Provider attempted to awaken patient who initially would not response. Then patient mumbled “no” a few times when asked to be interviewed. Patient uncooperative with assessment attempt, may be secondary to recent medication received. Patient informed that team would reevaluate him in the morning.       Collateral per LPN at [facility]. Reports that patient had taken his seizure medication later than usual that day. Reports that patient is typically complaint with his medications. Reports that patient has been at [facility] since June 2019. Confirms his medications as documented in note. Affirms that patient last received his Risperdal Consta 37.5 mg biweekly injection on 5/5/21. States that patient can be agitated at times but typically not a behavioral issue. Patient spends majority of his day outside. She informs that he “smokes things” but is unable to elaborate. He keeps up with ADLs. He has no acute suicidal or homicidal behavior. Once medically cleared he is safe to return to residence.

## 2021-05-09 NOTE — BEHAVIORAL HEALTH ASSESSMENT NOTE - NSBHMEDSOTHERFT_PSY_A_CORE
Keppra 1000 mg qdaily, 1500 mg q daily     Vit D3 1000 units qdaily   Depakote 500 mg once a day, 1000 mg once a day   Risperdal consta 37.5 every 2 weeks (last given 5/5/21)

## 2021-05-09 NOTE — BEHAVIORAL HEALTH ASSESSMENT NOTE - NSBHCHARTREVIEWIMAGING_PSY_A_CORE FT
< from: CT Head No Cont (05.08.21 @ 18:48) >    IMPRESSION:  No CT evidence of acute intracranial pathology.    < end of copied text >

## 2021-05-09 NOTE — CONSULT NOTE ADULT - ASSESSMENT
Impression:  35 y/o M PMH schizophrenia, mood disorder, seizure history on keppra, here for evaluation of witnessed seizure x2 today.   Patient with abrasions to left face/ forehead. Patient agitated in ER and given sedation. Suboptimal exam due to sedation. Patient woke up and was agitated and patient re-sedated.     Suggestion:  -VPA level 61.   -Keppra level please.   -Med reconciliation is imperative.   -Keep on Keppra 1250 mg q12 hours as per most recent med rec.   -Keep magnesium >2  -Seizure precaution.     John Mcdonald NP  [phone] Impression:  37 y/o M PMH schizophrenia, mood disorder, seizure history on keppra, here for evaluation of witnessed seizure x2 today.   Patient with abrasions to left face/ forehead. Patient agitated in ER and given sedation. Suboptimal exam due to sedation. Patient woke up and was agitated and patient re-sedated. While sedated no obvious sign of seizure or status epilepticus.     Suggestion:  -VPA level 61.   -Keppra level please.   -Med reconciliation is imperative.   -Keep on Keppra 1250 mg q12 hours as per most recent med rec.   -Keep magnesium >2  -Seizure precaution.   -Will discuss with attending and determine VEEG or REEG.     John Mcdonald NP  [id number] Impression:  37 y/o M PM schizophrenia, mood disorder, seizure history on keppra, here for evaluation of witnessed seizure x2 today.   Patient with abrasions to left face/ forehead. Patient agitated in ER and given sedation. Suboptimal exam due to sedation. Patient woke up and was agitated and patient re-sedated. While sedated no obvious sign of seizure or status epilepticus.     Suggestion:  -VPA level 61.   -Keppra level please.   -Med reconciliation is imperative, Patient saw Dr. Benavidez in March 2020 with telephone med adjusting thereafter  -Keep on Keppra 1250 mg q12 hours   -Continue depakote 750 mg in AM and 1000 mg in PM.   -Keep magnesium >2  -Seizure precaution.   -Will discuss with attending and determine VEEG or REEG.     John Mcdonald NP  [phone]

## 2021-05-09 NOTE — BEHAVIORAL HEALTH ASSESSMENT NOTE - RISK ASSESSMENT
low-moderate risk for self harm. Risk factors include psychiatric history, recent aggression. Protective factors include patient being compliant with medications, in established treatment, residing in structured facility, and ease of access to care. Low Acute Suicide Risk

## 2021-05-09 NOTE — H&P ADULT - NSHPLABSRESULTS_GEN_ALL_CORE
15.3   9.26  )-----------( 157      ( 08 May 2021 18:20 )             44.8       05-08    141  |  105  |  10  ----------------------------<  82  4.4   |  25  |  1.1    Ca    9.2      08 May 2021 18:20  Mg     1.9     05-08    TPro  6.4  /  Alb  4.1  /  TBili  0.3  /  DBili  x   /  AST  17  /  ALT  18  /  AlkPhos  20<L>  05-08                      Lactate Trend            CAPILLARY BLOOD GLUCOSE      POCT Blood Glucose.: 82 mg/dL (08 May 2021 18:10)

## 2021-05-09 NOTE — BEHAVIORAL HEALTH ASSESSMENT NOTE - NSBHCHARTREVIEWINVESTIGATE_PSY_A_CORE FT
< from: 12 Lead ECG (05.09.21 @ 00:58) >      Ventricular Rate 71 BPM    QRS Duration 92 ms    Q-T Interval 378 ms    QTC Calculation(Bazett) 410 ms    R Axis 60 degrees    T Axis 40 degrees    Diagnosis Line Sinus rhythm  Normal ECG    < end of copied text >

## 2021-05-09 NOTE — H&P ADULT - NSHPPHYSICALEXAM_GEN_ALL_CORE
General: well developed, well nourished, NAD  Neuro: alert and oriented x3, MS intact, unable to assess extremities on 4 point restraints, follows directions, no facial asymmetry  HEENT: NCAT, EOMI, anicteric,  Right eyebrow 2 cm abrasion  Respiratory: airway patent, respirations unlabored  CVS: regular rate and rhythm  Abdomen: soft, nontender, nondistended  Extremities: no edema, sensation and movement grossly intact  Skin: warm, dry, appropriate color

## 2021-05-09 NOTE — CONSULT NOTE ADULT - SUBJECTIVE AND OBJECTIVE BOX
Neurology Consult    Patient is a 36y old  Male who presents with a chief complaint of seizure    HPI:  35 y/o M PMH schizophrenia, mood disorder, seizure history on kera, here for evaluation of witnessed seizure x2 today.   Patient with abrasions to left face/ forehead. Patient agitated in ER and given sedation.     PAST MEDICAL & SURGICAL HISTORY:      FAMILY HISTORY:      Social History: (-) x 3    Allergies    No Known Allergies    Intolerances        MEDICATIONS  (STANDING):  sodium chloride 0.9% Bolus 1000 milliLiter(s) IV Bolus once    MEDICATIONS  (PRN):      Vital Signs Last 24 Hrs  T(C): 36.4 (08 May 2021 23:02), Max: 36.6 (08 May 2021 15:29)  T(F): 97.5 (08 May 2021 23:02), Max: 97.8 (08 May 2021 15:29)  HR: 78 (08 May 2021 23:02) (49 - 89)  BP: 128/72 (08 May 2021 23:02) (107/64 - 137/73)  BP(mean): 86 (08 May 2021 22:30) (81 - 97)  RR: 18 (08 May 2021 23:02) (18 - 18)  SpO2: 99% (08 May 2021 23:02) (99% - 100%)    Examination:  Exam limited by sedation.   Exam post sedation.   Moves all extremities to peripheral noxious stimuli.  Pupils 3mm and reactive to light.      Labs:   CBC Full  -  ( 08 May 2021 18:20 )  WBC Count : 9.26 K/uL  RBC Count : 4.90 M/uL  Hemoglobin : 15.3 g/dL  Hematocrit : 44.8 %  Platelet Count - Automated : 157 K/uL  Mean Cell Volume : 91.4 fL  Mean Cell Hemoglobin : 31.2 pg  Mean Cell Hemoglobin Concentration : 34.2 g/dL  Auto Neutrophil # : 6.90 K/uL  Auto Lymphocyte # : 1.60 K/uL  Auto Monocyte # : 0.71 K/uL  Auto Eosinophil # : 0.00 K/uL  Auto Basophil # : 0.01 K/uL  Auto Neutrophil % : 74.5 %  Auto Lymphocyte % : 17.3 %  Auto Monocyte % : 7.7 %  Auto Eosinophil % : 0.0 %  Auto Basophil % : 0.1 %    05-08    141  |  105  |  10  ----------------------------<  82  4.4   |  25  |  1.1    Ca    9.2      08 May 2021 18:20  Mg     1.9     05-08    TPro  6.4  /  Alb  4.1  /  TBili  0.3  /  DBili  x   /  AST  17  /  ALT  18  /  AlkPhos  20<L>  05-08    LIVER FUNCTIONS - ( 08 May 2021 18:20 )  Alb: 4.1 g/dL / Pro: 6.4 g/dL / ALK PHOS: 20 U/L / ALT: 18 U/L / AST: 17 U/L / GGT: x                   Neuroimaging:  NCHCT: CT Head No Cont:   EXAM:  CT BRAIN            IMPRESSION:  No CT evidence of acute intracranial pathology.

## 2021-05-09 NOTE — CONSULT NOTE ADULT - ATTENDING COMMENTS
Patient seen and examined and agree with above except as noted.  Patients history, notes, labs, imaging, meds and vitals reviewed by me personally.  Patient seen and examined this morning and was very sedated and not participating in exam.  Would mumble one word answers and when positioned on his back he rolled back to his left side immediately afterwards.  Has history of seizures unsure about compliance however valproic acid level present.      Plan as above (please obtain Routine EEG to screen for active seizures; keep magnesium >2.0)

## 2021-05-10 ENCOUNTER — TRANSCRIPTION ENCOUNTER (OUTPATIENT)
Age: 36
End: 2021-05-10

## 2021-05-10 VITALS
TEMPERATURE: 98 F | RESPIRATION RATE: 18 BRPM | SYSTOLIC BLOOD PRESSURE: 117 MMHG | HEART RATE: 67 BPM | DIASTOLIC BLOOD PRESSURE: 64 MMHG

## 2021-05-10 PROCEDURE — 95816 EEG AWAKE AND DROWSY: CPT | Mod: 26

## 2021-05-10 PROCEDURE — 99231 SBSQ HOSP IP/OBS SF/LOW 25: CPT | Mod: GC

## 2021-05-10 PROCEDURE — 99239 HOSP IP/OBS DSCHRG MGMT >30: CPT

## 2021-05-10 RX ADMIN — LEVETIRACETAM 1000 MILLIGRAM(S): 250 TABLET, FILM COATED ORAL at 05:54

## 2021-05-10 RX ADMIN — DIVALPROEX SODIUM 500 MILLIGRAM(S): 500 TABLET, DELAYED RELEASE ORAL at 05:54

## 2021-05-10 NOTE — PROGRESS NOTE BEHAVIORAL HEALTH - RISK ASSESSMENT
Risk factors include psychiatric history, recent aggression. Protective factors include patient being compliant with medications, in established treatment, residing in structured facility, and ease of access to care.

## 2021-05-10 NOTE — DISCHARGE NOTE PROVIDER - PROVIDER TOKENS
PROVIDER:[TOKEN:[[id number]],FOLLOWUP:[1 week]],PROVIDER:[TOKEN:[[id number]],FOLLOWUP:[1 week]]

## 2021-05-10 NOTE — DISCHARGE NOTE PROVIDER - CARE PROVIDERS DIRECT ADDRESSES
[email or web address]

## 2021-05-10 NOTE — PROGRESS NOTE BEHAVIORAL HEALTH - NSBHCHARTREVIEWLAB_PSY_A_CORE FT
15.9   7.83  )-----------( 155      ( 09 May 2021 20:00 )             45.5     05-08    141  |  105  |  10  ----------------------------<  82  4.4   |  25  |  1.1    Ca    9.2      08 May 2021 18:20  Mg     1.9     05-08    TPro  6.4  /  Alb  4.1  /  TBili  0.3  /  DBili  x   /  AST  17  /  ALT  18  /  AlkPhos  20<L>  05-08    LIVER FUNCTIONS - ( 08 May 2021 18:20 )  Alb: 4.1 g/dL / Pro: 6.4 g/dL / ALK PHOS: 20 U/L / ALT: 18 U/L / AST: 17 U/L / GGT: x

## 2021-05-10 NOTE — DISCHARGE NOTE PROVIDER - NSDCCPCAREPLAN_GEN_ALL_CORE_FT
PRINCIPAL DISCHARGE DIAGNOSIS  Diagnosis: Seizure  Assessment and Plan of Treatment: You were brought to the hospital after having a seizure episode. CT head was negative. The neurology team saw you and recommended routine EEG. YOu have now been diacharged, please take medications as prescribed. Please follow up with your PCP.

## 2021-05-10 NOTE — PROGRESS NOTE BEHAVIORAL HEALTH - NSBHCONSULTFOLLOWAFTERCARE_PSY_A_CORE FT
patient to return to [facility] residence and continue outpatient psychiatric treatment

## 2021-05-10 NOTE — PROGRESS NOTE BEHAVIORAL HEALTH - NSBHCONSULTOBSREASON_PSY_A_CORE FT
no psychiatric need for constant observation, however consider enhanced supervision due to elopement risk

## 2021-05-10 NOTE — PROGRESS NOTE BEHAVIORAL HEALTH - NSBHFUPINTERVALHXFT_PSY_A_CORE
Per staff, patient received ativan 2mg yesterday evening at 5pm, no reports of agitation afterwards or this morning, has been sleeping all morning.  Patient seen and examined at bedside. On first evaluation patient noted to be sleeping heavily, could not be roused for interview. Per PCA at bedside no issues were noted all morning.  On revisit, patient more awake. Patient denying any acute concerns at this time, asking to be discharged as soon as possible so that he can get back to his residence. No reports of agitation since prior attempt at interview in morning, patient has been receiving medications as scheduled.

## 2021-05-10 NOTE — PROGRESS NOTE BEHAVIORAL HEALTH - NSBHCHARTREVIEWINVESTIGATE_PSY_A_CORE FT
< from: 12 Lead ECG (05.09.21 @ 00:58) >      Ventricular Rate 71 BPM    QRS Duration 92 ms    Q-T Interval 378 ms    QTC Calculation(Bazett) 410 ms    R Axis 60 degrees    T Axis 40 degrees    Diagnosis Line Sinus rhythm  Normal ECG      Reconfirmed by ROLY ARIZA MD ([id number]) on 5/9/2021 6:20:50 AM    < end of copied text >

## 2021-05-10 NOTE — DISCHARGE NOTE PROVIDER - NSDCMRMEDTOKEN_GEN_ALL_CORE_FT
divalproex sodium: 1000  orally once a day (at bedtime)  divalproex sodium 500 mg oral delayed release tablet: 1 tab(s) orally in am  Keppra: 1500 tab(s) orally once a day (at bedtime)  levETIRAcetam 1000 mg oral tablet: 1 tab(s) orally once a day in am  RisperDAL Consta 37.5 mg/2 weeks intramuscular injection, extended release: 2 milliliter(s) intramuscular every 2 weeks  Vitamin D3 2000 intl units (50 mcg) oral tablet:

## 2021-05-10 NOTE — DISCHARGE NOTE PROVIDER - CARE PROVIDER_API CALL
Shellie Braden)  Internal Medicine  [address] [facility], [address]st [place] [address]  Phone: [phone]  Fax: [phone]  Follow Up Time: 1 week    Alice Rivas)  Neurology  [address] [facility], [address]  [place] [address]  Phone: [phone]  Fax: [phone]  Follow Up Time: 1 week

## 2021-05-10 NOTE — PROGRESS NOTE BEHAVIORAL HEALTH - CASE SUMMARY
Mr Whaley is a 36 year old man with a history of Schizoaffective disorder who was admitted to the medical floor for the management of seizures . Psychiatry consult was called for the management of agitation.   Patient appears irritable today in the context of wanting to be discharged back to his residence. He no longer appears agitated and is easily redirectable.   Patient does not appear acutely psychotic , manic or depressed , he does not have current suicidal ideations, intent or plan.   At time patient is not considered an imminent danger to himself or others and does not need inpatient psychiatric hospitalization. Patient can continue his psychotropic medications as prescribed by his outpatient with the plan to follow up on outpatient basis upon discharge.

## 2021-05-10 NOTE — PROGRESS NOTE BEHAVIORAL HEALTH - NSBHCHARTREVIEWIMAGING_PSY_A_CORE FT
< from: CT Head No Cont (05.08.21 @ 18:48) >      EXAM:  CT BRAIN            PROCEDURE DATE:  05/08/2021            INTERPRETATION:  Clinical History / Reason for exam: Seizure.    Technique: Multiple contiguous axial CT images of the head were obtained from the base of the skull to the vertex without administration of intravenous contrast. Sagittal and coronal reformations were also obtained.    Comparison: None available at this time.    FINDINGS:    The ventricles, basal cisterns and sulcal pattern are within normal limits for patient's stated age.    There is no acute mass effect, midline shift or intracranial hemorrhage.    No evidence of depressed skull fracture.    The visualized paranasal sinuses are well aerated.    The bilateral mastoid complexes, globes and orbits are grossly within normal limits.    IMPRESSION:  No CT evidence of acute intracranial pathology.      ALEKSANDAR CELAYA M.D., RESIDENT RADIOLOGIST  This document has been electronically signed.  CLARA WALTERS MD; Attending Radiologist  This document has been electronically signed. May  8 2021  8:03PM    < end of copied text >

## 2021-05-10 NOTE — DISCHARGE NOTE PROVIDER - HOSPITAL COURSE
37 Yo M with PMH of Schizoprhenia, Adjustment Disorder, Epilepsy, Anxiety Disorder [facility] from [facility] s/p seizure and head trauma. Patient is on 4 point restraint upon my interview, but cooperative to questioning. He states that he fell this afternoon, (cannot remember details preceeding fall, or how he fell). He states "I just fell". Patient states that he woke up with people around him and was brought to the hospital. Per Psych facility report, patient had a seizure around 3 pm this afternoon which lasted approximately four mins, (he was found on the floor by staff, leaning on his left side), noted to have an abrasion above his right forehead and had an unwitnessed fall. Unable to reach staff members at facility overnight, but per nurse on call, patient received all of his seizure medications on timely manner and otherwise at his usual state of health. No reported hx of recent illness, medication changes, chest pain, palpitations, or focal deficits.     ED course: BP: 128/72, HR: 78, T: 98.2, SPO2 98% on room air. No acute electrolyte abnormalities. FS normal. CT head negative for hemorrhage/cva, s/p 1 dose of haldol and ativan for agitation in ED, placed on 4 point restraint and 1:1 observation.    Per neurology, cont Keppra 1250 mg q12 hours.  -Continue depakote 750 mg in AM and 1000 mg in PM.   -REEG performed to screen for active seizures.    ` 35 Yo M with PMH of Schizoprhenia, Adjustment Disorder, Epilepsy, Anxiety Disorder [facility] EMS from [facility] s/p seizure and head trauma. Patient is on 4 point restraint upon my interview, but cooperative to questioning. He states that he fell this afternoon, (cannot remember details preceeding fall, or how he fell). He states "I just fell". Patient states that he woke up with people around him and was brought to the hospital. Per Psych facility report, patient had a seizure around 3 pm this afternoon which lasted approximately four mins, (he was found on the floor by staff, leaning on his left side), noted to have an abrasion above his right forehead and had an unwitnessed fall. Unable to reach staff members at facility overnight, but per nurse on call, patient received all of his seizure medications on timely manner and otherwise at his usual state of health. No reported hx of recent illness, medication changes, chest pain, palpitations, or focal deficits.     ED course: BP: 128/72, HR: 78, T: 98.2, SPO2 98% on room air. No acute electrolyte abnormalities. FS normal. CT head negative for hemorrhage/cva, s/p 1 dose of haldol and ativan for agitation in ED, placed on 4 point restraint and 1:1 observation.    Per neurology, cont Keppra 1250 mg q12 hours.  -Continue depakote 750 mg in AM and 1000 mg in PM.   -REEG performed : negative. Pt stable to be discharged back to [facility]. Pt to follow up with his Neurologist as an outpatient.    ` 37 Yo M with PMH of Schizoprhenia, Adjustment Disorder, Epilepsy, Anxiety Disorder A[facility] EMS from [facility] s/p seizure and head trauma. Patient is on 4 point restraint upon my interview, but cooperative to questioning. He states that he fell this afternoon, (cannot remember details preceeding fall, or how he fell). He states "I just fell". Patient states that he woke up with people around him and was brought to the hospital. Per Psych facility report, patient had a seizure around 3 pm this afternoon which lasted approximately four mins, (he was found on the floor by staff, leaning on his left side), noted to have an abrasion above his right forehead and had an unwitnessed fall. Unable to reach staff members at facility overnight, but per nurse on call, patient received all of his seizure medications on timely manner and otherwise at his usual state of health. No reported hx of recent illness, medication changes, chest pain, palpitations, or focal deficits.     ED course: BP: 128/72, HR: 78, T: 98.2, SPO2 98% on room air. No acute electrolyte abnormalities. FS normal. CT head negative for hemorrhage/cva, s/p 1 dose of haldol and ativan for agitation in ED, placed on 4 point restraint and 1:1 observation.    Per neurology, cont Keppra 1250 mg q12 hours.  -Continue depakote 750 mg in AM and 1000 mg in PM.   -REEG performed : negative. Pt stable to be discharged back to [facility]. Pt to follow up with his Neurologist as an outpatient.  Attending Attestation:  Patient was seen & examined independently. At least 10 systems were reviewed in ROS. All systems reviewed  are within normal limits. Latest vital signs and labs were reviewed today. Case was discussed with house staff in morning rounds for assessment and plan.  Patient is medically stable for discharge . About 32 mins spent on discharge disposition.

## 2021-05-10 NOTE — PROGRESS NOTE BEHAVIORAL HEALTH - NSBHCHARTREVIEWVS_PSY_A_CORE FT
Vital Signs Last 24 Hrs  T(C): 36.7 (10 May 2021 11:13), Max: 36.8 (09 May 2021 18:42)  T(F): 98 (10 May 2021 11:13), Max: 98.3 (09 May 2021 18:42)  HR: 51 (10 May 2021 11:13) (51 - 75)  BP: 120/72 (10 May 2021 11:13) (110/60 - 120/72)  BP(mean): --  RR: 18 (10 May 2021 11:13) (16 - 18)  SpO2: --

## 2021-05-11 LAB
COVID-19 SPIKE DOMAIN AB INTERP: NEGATIVE — SIGNIFICANT CHANGE UP
COVID-19 SPIKE DOMAIN ANTIBODY RESULT: 0.4 U/ML — SIGNIFICANT CHANGE UP
SARS-COV-2 IGG+IGM SERPL QL IA: 0.4 U/ML — SIGNIFICANT CHANGE UP
SARS-COV-2 IGG+IGM SERPL QL IA: NEGATIVE — SIGNIFICANT CHANGE UP

## 2021-05-12 RX ORDER — LEVETIRACETAM 250 MG/1
1 TABLET, FILM COATED ORAL
Qty: 0 | Refills: 0 | DISCHARGE

## 2021-05-12 RX ORDER — LEVETIRACETAM 250 MG/1
1500 TABLET, FILM COATED ORAL
Qty: 0 | Refills: 0 | DISCHARGE

## 2021-05-18 ENCOUNTER — APPOINTMENT (OUTPATIENT)
Dept: NEUROLOGY | Facility: CLINIC | Age: 36
End: 2021-05-18

## 2021-05-18 DIAGNOSIS — S00.81XA ABRASION OF OTHER PART OF HEAD, INITIAL ENCOUNTER: ICD-10-CM

## 2021-05-18 DIAGNOSIS — F25.9 SCHIZOAFFECTIVE DISORDER, UNSPECIFIED: ICD-10-CM
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
## 2021-05-18 DIAGNOSIS — F20.9 SCHIZOPHRENIA, UNSPECIFIED: ICD-10-CM

## 2021-05-18 DIAGNOSIS — G40.909 EPILEPSY, UNSPECIFIED, NOT INTRACTABLE, WITHOUT STATUS EPILEPTICUS: ICD-10-CM

## 2021-05-18 DIAGNOSIS — S00.211A ABRASION OF RIGHT EYELID AND PERIOCULAR AREA, INITIAL ENCOUNTER: ICD-10-CM

## 2021-05-18 DIAGNOSIS — Z20.822 CONTACT WITH AND (SUSPECTED) EXPOSURE TO COVID-19: ICD-10-CM

## 2021-05-18 DIAGNOSIS — Z78.1 PHYSICAL RESTRAINT STATUS: ICD-10-CM

## 2021-05-18 DIAGNOSIS — R45.1 RESTLESSNESS AND AGITATION: ICD-10-CM

## 2021-05-18 DIAGNOSIS — F39 UNSPECIFIED MOOD [AFFECTIVE] DISORDER: ICD-10-CM

## 2021-05-18 DIAGNOSIS — S60.511A ABRASION OF RIGHT HAND, INITIAL ENCOUNTER: ICD-10-CM

## 2021-05-18 DIAGNOSIS — F41.9 ANXIETY DISORDER, UNSPECIFIED: ICD-10-CM

## 2021-05-18 DIAGNOSIS — G40.802 OTHER EPILEPSY, NOT INTRACTABLE, WITHOUT STATUS EPILEPTICUS: ICD-10-CM

## 2021-05-18 DIAGNOSIS — F43.20 ADJUSTMENT DISORDER, UNSPECIFIED: ICD-10-CM
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
## 2021-06-10 NOTE — ED PROVIDER NOTE - DOMESTIC TRAVEL HIGH RISK QUESTION
Wellness Screening Tool  Symptom Screening:  Do you have one of the following NEW symptoms:    Fever (subjective or >100.0)?  No    A new cough?  No    Shortness of breath?  No     Chills? No     New loss of taste or smell? No     Generalized body aches? No     New persistent headache? No     New sore throat? No     Nausea, vomiting, or diarrhea?  No    Within the past 3 weeks, have you been exposed to someone with a known positive illness below:    COVID-19 (known or suspected)?  No    Chicken pox?  No    Mealses?  No    Pertussis?  No    Patient notified of visitor policy- They may have one person accompany them to their appointment, but they will need to wear a mask and will be screened upon arrival for symptoms: Yes  Pt informed to wear a mask: Yes  Pt notified if they develop any symptoms listed above, prior to their appointment, they are to call the clinic directly at [phone] for further instructions.  Yes  Patient's appointment status: Patient will be seen in clinic as scheduled on 8/4/20        No

## 2021-07-23 ENCOUNTER — EMERGENCY (EMERGENCY)
Facility: HOSPITAL | Age: 36
LOS: 0 days | Discharge: HOME | End: 2021-07-23
Attending: EMERGENCY MEDICINE | Admitting: EMERGENCY MEDICINE
Payer: MEDICAID

## 2021-07-23 VITALS
RESPIRATION RATE: 18 BRPM | OXYGEN SATURATION: 99 % | HEIGHT: 66 IN | SYSTOLIC BLOOD PRESSURE: 113 MMHG | TEMPERATURE: 99 F | DIASTOLIC BLOOD PRESSURE: 69 MMHG | HEART RATE: 77 BPM

## 2021-07-23 DIAGNOSIS — Z86.69 PERSONAL HISTORY OF OTHER DISEASES OF THE NERVOUS SYSTEM AND SENSE ORGANS: ICD-10-CM

## 2021-07-23 DIAGNOSIS — F20.9 SCHIZOPHRENIA, UNSPECIFIED: ICD-10-CM

## 2021-07-23 DIAGNOSIS — Z79.899 OTHER LONG TERM (CURRENT) DRUG THERAPY: ICD-10-CM

## 2021-07-23 DIAGNOSIS — F19.10 OTHER PSYCHOACTIVE SUBSTANCE ABUSE, UNCOMPLICATED: ICD-10-CM

## 2021-07-23 DIAGNOSIS — F31.9 BIPOLAR DISORDER, UNSPECIFIED: ICD-10-CM

## 2021-07-23 DIAGNOSIS — Z86.59 PERSONAL HISTORY OF OTHER MENTAL AND BEHAVIORAL DISORDERS: ICD-10-CM

## 2021-07-23 PROCEDURE — 99284 EMERGENCY DEPT VISIT MOD MDM: CPT

## 2021-07-23 NOTE — ED ADULT NURSE NOTE - OBJECTIVE STATEMENT
As per 777 staff pt was found on the floor. Pt denies drug use, denies being on the floor and states he wants to go back.

## 2021-07-23 NOTE — ED PROVIDER NOTE - PROGRESS NOTE DETAILS
pt refuses all blood work/xray/ekg, attempted to smoke marijuana in the ED., wants to go back to [facility], FS 94, will dc called #'s listed on pt's paperwork from facility and could not reach anyone, left message ATTENDING NOTE: I personally evaluated the patient. I reviewed the Physician Assistant’s note (as assigned above), and agree with the findings and plan except as documented in my note. 37 y/o M PMH schizophrenia/Bi-polar on Risperidone and seizure disorder on Keppra sent in by [address] [place] [address] for evaluation of potential illicit substance use. Pt denies any seizures. States that he is compliant with his medications. On exam: Pt is awake and alert. (+) chronic burns to R 1St and 3rd digits distally. No tongue lacerations. Tongue is clear. ABD soft NTND. Pt attempted to smoke marijuana in the ED bathroom. Refusing lab testing. Finger stick is normal. DC back to facility. ATTENDING NOTE: I personally evaluated the patient. I reviewed the Physician Assistant’s note (as assigned above), and agree with the findings and plan except as documented in my note. 35 y/o M PMH schizophrenia/Bi-polar on Risperidone and seizure disorder on Keppra/depekote sent in by [address] [place] [address] for evaluation of potential illicit substance use. Pt denies any seizures. States that he is compliant with his medications. On exam: Pt is awake and alert. (+) chronic burns to R 1St and 3rd digits distally. No tongue lacerations. Tongue is clear. ABD soft NTND. Pt attempted to smoke marijuana in the ED bathroom. Refusing lab testing. Finger stick is normal. DC back to facility.

## 2021-07-23 NOTE — ED PROVIDER NOTE - PHYSICAL EXAMINATION
VITAL SIGNS: I have reviewed nursing notes and confirm.  CONSTITUTIONAL: disheveled. smells of smoke  SKIN:  skin exam is warm and dry, no acute rash.    HEAD: Normocephalic; atraumatic.  EYES: conjunctiva and sclera clear.  ENT: No nasal discharge; airway clear.  CARD: S1, S2 normal; no murmurs, gallops, or rubs. Regular rate and rhythm.   RESP: No wheezes, rales or rhonchi.  ABD: Normal bowel sounds; soft; non-distended; non-tender  EXT: Normal ROM.  No clubbing, cyanosis or edema.   NEURO: Alert, oriented, grossly unremarkable

## 2021-07-23 NOTE — ED PROVIDER NOTE - NSFOLLOWUPCLINICS_GEN_ALL_ED_FT
[facility]  Detox Mgmt  [address] [place] [address]  Phone: [phone]  Fax:     [facility]  OP Mental Health  [address] [place] [address]  Phone: [phone]  Fax:

## 2021-07-23 NOTE — ED PROVIDER NOTE - PATIENT PORTAL LINK FT
You can access the FollowMyHealth Patient Portal offered by [facility] by registering at the following website: [email or web address]. By joining [facility]’s FollowMyHealth portal, you will also be able to view your health information using other applications (apps) compatible with our system.

## 2021-07-23 NOTE — ED ADULT NURSE NOTE - NSIMPLEMENTINTERV_GEN_ALL_ED
Implemented All Universal Safety Interventions:  [place] to call system. Call bell, personal items and telephone within reach. Instruct patient to call for assistance. Room bathroom lighting operational. Non-slip footwear when patient is off stretcher. Physically safe environment: no spills, clutter or unnecessary equipment. Stretcher in lowest position, wheels locked, appropriate side rails in place.

## 2021-07-23 NOTE — ED PROVIDER NOTE - NS ED ROS FT
Cardiac:  No chest pain  Respiratory:  No cough or respiratory distress. No hemoptysis. No history of asthma or RAD.  GI:  No nausea, vomiting, diarrhea or abdominal pain.  MS:  No myalgia, muscle weakness, joint pain or back pain.  Neuro:  No headache or weakness.  No LOC.  Skin:  No skin rash.   Endocrine: No history of thyroid disease or diabetes.  Except as documented in the HPI,  all other systems are negative.

## 2021-07-23 NOTE — ED PROVIDER NOTE - OBJECTIVE STATEMENT
Pt is a 35y/o male with a pmhx of schizophrenia, bipolar, seizure dz presents today for eval from [facility] for eval of probable illict drug use. Pt uncooperative with exam and answers "no" to every question

## 2021-07-27 ENCOUNTER — APPOINTMENT (OUTPATIENT)
Dept: CARDIOLOGY | Facility: CLINIC | Age: 36
End: 2021-07-27

## 2021-08-10 NOTE — ED ADULT TRIAGE NOTE - NS ED NURSE BANDS TYPE
Name band; Clindamycin Pregnancy And Lactation Text: This medication can be used in pregnancy if certain situations. Clindamycin is also present in breast milk.

## 2021-08-23 NOTE — ED ADULT NURSE NOTE - DISCHARGE DATE/TIME
21-Jan-2020 15:00 Z Plasty Text: The lesion was extirpated to the level of the fat with a #15 scalpel blade.  Given the location of the defect, shape of the defect and the proximity to free margins a Z-plasty was deemed most appropriate for repair.  Using a sterile surgical marker, the appropriate transposition arms of the Z-plasty were drawn incorporating the defect and placing the expected incisions within the relaxed skin tension lines where possible.    The area thus outlined was incised deep to adipose tissue with a #15 scalpel blade.  The skin margins were undermined to an appropriate distance in all directions utilizing iris scissors.  The opposing transposition arms were then transposed into place in opposite direction and anchored with interrupted buried subcutaneous sutures.

## 2021-08-26 ENCOUNTER — EMERGENCY (EMERGENCY)
Facility: HOSPITAL | Age: 36
LOS: 0 days | Discharge: HOME | End: 2021-08-26
Attending: STUDENT IN AN ORGANIZED HEALTH CARE EDUCATION/TRAINING PROGRAM | Admitting: STUDENT IN AN ORGANIZED HEALTH CARE EDUCATION/TRAINING PROGRAM
Payer: MEDICAID

## 2021-08-26 VITALS
SYSTOLIC BLOOD PRESSURE: 130 MMHG | TEMPERATURE: 98 F | HEART RATE: 72 BPM | DIASTOLIC BLOOD PRESSURE: 68 MMHG | HEIGHT: 66 IN | WEIGHT: 154.32 LBS | RESPIRATION RATE: 18 BRPM | OXYGEN SATURATION: 99 %
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
## 2021-08-26 DIAGNOSIS — Z20.822 CONTACT WITH AND (SUSPECTED) EXPOSURE TO COVID-19: ICD-10-CM
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
## 2021-08-26 DIAGNOSIS — R56.9 UNSPECIFIED CONVULSIONS: ICD-10-CM

## 2021-08-26 DIAGNOSIS — S01.81XA LACERATION WITHOUT FOREIGN BODY OF OTHER PART OF HEAD, INITIAL ENCOUNTER: ICD-10-CM
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
## 2021-08-26 DIAGNOSIS — Z86.19 PERSONAL HISTORY OF OTHER INFECTIOUS AND PARASITIC DISEASES: ICD-10-CM

## 2021-08-26 DIAGNOSIS — F20.9 SCHIZOPHRENIA, UNSPECIFIED: ICD-10-CM

## 2021-08-26 DIAGNOSIS — Z79.899 OTHER LONG TERM (CURRENT) DRUG THERAPY: ICD-10-CM

## 2021-08-26 DIAGNOSIS — Z23 ENCOUNTER FOR IMMUNIZATION: ICD-10-CM
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
## 2021-08-26 DIAGNOSIS — F31.9 BIPOLAR DISORDER, UNSPECIFIED: ICD-10-CM

## 2021-08-26 LAB
ALBUMIN SERPL ELPH-MCNC: 4.7 G/DL — SIGNIFICANT CHANGE UP (ref 3.5–5.2)
ALP SERPL-CCNC: 23 U/L — LOW (ref 30–115)
ALT FLD-CCNC: 13 U/L — SIGNIFICANT CHANGE UP (ref 0–41)
ANION GAP SERPL CALC-SCNC: 33 MMOL/L — HIGH (ref 7–14)
APPEARANCE UR: CLEAR — SIGNIFICANT CHANGE UP
APTT BLD: 32.7 SEC — SIGNIFICANT CHANGE UP (ref 27–39.2)
AST SERPL-CCNC: 19 U/L — SIGNIFICANT CHANGE UP (ref 0–41)
BASOPHILS # BLD AUTO: 0.02 K/UL — SIGNIFICANT CHANGE UP (ref 0–0.2)
BASOPHILS NFR BLD AUTO: 0.2 % — SIGNIFICANT CHANGE UP (ref 0–1)
BILIRUB SERPL-MCNC: 0.3 MG/DL — SIGNIFICANT CHANGE UP (ref 0.2–1.2)
BILIRUB UR-MCNC: NEGATIVE — SIGNIFICANT CHANGE UP
BUN SERPL-MCNC: 18 MG/DL — SIGNIFICANT CHANGE UP (ref 10–20)
CA-I SERPL-SCNC: 1.16 MMOL/L — SIGNIFICANT CHANGE UP (ref 1.15–1.33)
CALCIUM SERPL-MCNC: 10 MG/DL — SIGNIFICANT CHANGE UP (ref 8.5–10.1)
CHLORIDE SERPL-SCNC: 99 MMOL/L — SIGNIFICANT CHANGE UP (ref 98–110)
CO2 SERPL-SCNC: 10 MMOL/L — LOW (ref 17–32)
COLOR SPEC: COLORLESS — SIGNIFICANT CHANGE UP
CREAT SERPL-MCNC: 1.3 MG/DL — SIGNIFICANT CHANGE UP (ref 0.7–1.5)
DIFF PNL FLD: SIGNIFICANT CHANGE UP
EOSINOPHIL # BLD AUTO: 0.08 K/UL — SIGNIFICANT CHANGE UP (ref 0–0.7)
EOSINOPHIL NFR BLD AUTO: 0.9 % — SIGNIFICANT CHANGE UP (ref 0–8)
ETHANOL SERPL-MCNC: <10 MG/DL — SIGNIFICANT CHANGE UP
GAS PNL BLDV: 135 MMOL/L — LOW (ref 136–145)
GAS PNL BLDV: SIGNIFICANT CHANGE UP
GLUCOSE SERPL-MCNC: 108 MG/DL — HIGH (ref 70–99)
GLUCOSE UR QL: NEGATIVE — SIGNIFICANT CHANGE UP
HCO3 BLDV-SCNC: 27 MMOL/L — SIGNIFICANT CHANGE UP (ref 22–29)
HCT VFR BLD CALC: 52 % — SIGNIFICANT CHANGE UP (ref 42–52)
HCT VFR BLDA CALC: 45 % — SIGNIFICANT CHANGE UP (ref 39–51)
HGB BLD CALC-MCNC: 15.1 G/DL — SIGNIFICANT CHANGE UP (ref 12.6–17.4)
HGB BLD-MCNC: 16.8 G/DL — SIGNIFICANT CHANGE UP (ref 14–18)
IMM GRANULOCYTES NFR BLD AUTO: 0.8 % — HIGH (ref 0.1–0.3)
INR BLD: 0.97 RATIO — SIGNIFICANT CHANGE UP (ref 0.65–1.3)
KETONES UR-MCNC: ABNORMAL
LACTATE BLDV-MCNC: 2.6 MMOL/L — HIGH (ref 0.5–2)
LACTATE SERPL-SCNC: 21.5 MMOL/L — CRITICAL HIGH (ref 0.7–2)
LEUKOCYTE ESTERASE UR-ACNC: NEGATIVE — SIGNIFICANT CHANGE UP
LIDOCAIN IGE QN: 36 U/L — SIGNIFICANT CHANGE UP (ref 7–60)
LYMPHOCYTES # BLD AUTO: 3.71 K/UL — HIGH (ref 1.2–3.4)
LYMPHOCYTES # BLD AUTO: 43.1 % — SIGNIFICANT CHANGE UP (ref 20.5–51.1)
MCHC RBC-ENTMCNC: 32.1 PG — HIGH (ref 27–31)
MCHC RBC-ENTMCNC: 32.3 G/DL — SIGNIFICANT CHANGE UP (ref 32–37)
MCV RBC AUTO: 99.4 FL — HIGH (ref 80–94)
MONOCYTES # BLD AUTO: 0.72 K/UL — HIGH (ref 0.1–0.6)
MONOCYTES NFR BLD AUTO: 8.4 % — SIGNIFICANT CHANGE UP (ref 1.7–9.3)
NEUTROPHILS # BLD AUTO: 4 K/UL — SIGNIFICANT CHANGE UP (ref 1.4–6.5)
NEUTROPHILS NFR BLD AUTO: 46.6 % — SIGNIFICANT CHANGE UP (ref 42.2–75.2)
NITRITE UR-MCNC: NEGATIVE — SIGNIFICANT CHANGE UP
NRBC # BLD: 0 /100 WBCS — SIGNIFICANT CHANGE UP (ref 0–0)
PCO2 BLDV: 51 MMHG — SIGNIFICANT CHANGE UP (ref 42–55)
PH BLDV: 7.33 — SIGNIFICANT CHANGE UP (ref 7.32–7.43)
PH UR: 6 — SIGNIFICANT CHANGE UP (ref 5–8)
PLATELET # BLD AUTO: 167 K/UL — SIGNIFICANT CHANGE UP (ref 130–400)
PO2 BLDV: 34 MMHG — SIGNIFICANT CHANGE UP
POTASSIUM BLDV-SCNC: 4.4 MMOL/L — SIGNIFICANT CHANGE UP (ref 3.5–5.1)
POTASSIUM SERPL-MCNC: 4.1 MMOL/L — SIGNIFICANT CHANGE UP (ref 3.5–5)
POTASSIUM SERPL-SCNC: 4.1 MMOL/L — SIGNIFICANT CHANGE UP (ref 3.5–5)
PROT SERPL-MCNC: 7.5 G/DL — SIGNIFICANT CHANGE UP (ref 6–8)
PROT UR-MCNC: SIGNIFICANT CHANGE UP
PROTHROM AB SERPL-ACNC: 11.2 SEC — SIGNIFICANT CHANGE UP (ref 9.95–12.87)
RBC # BLD: 5.23 M/UL — SIGNIFICANT CHANGE UP (ref 4.7–6.1)
RBC # FLD: 11.9 % — SIGNIFICANT CHANGE UP (ref 11.5–14.5)
SAO2 % BLDV: 55.5 % — SIGNIFICANT CHANGE UP
SARS-COV-2 RNA SPEC QL NAA+PROBE: SIGNIFICANT CHANGE UP
SODIUM SERPL-SCNC: 142 MMOL/L — SIGNIFICANT CHANGE UP (ref 135–146)
SP GR SPEC: 1.02 — SIGNIFICANT CHANGE UP (ref 1.01–1.03)
UROBILINOGEN FLD QL: SIGNIFICANT CHANGE UP
VALPROATE SERPL-MCNC: 54 UG/ML — SIGNIFICANT CHANGE UP (ref 50–100)
WBC # BLD: 8.6 K/UL — SIGNIFICANT CHANGE UP (ref 4.8–10.8)
WBC # FLD AUTO: 8.6 K/UL — SIGNIFICANT CHANGE UP (ref 4.8–10.8)

## 2021-08-26 PROCEDURE — 71045 X-RAY EXAM CHEST 1 VIEW: CPT | Mod: 26

## 2021-08-26 PROCEDURE — 99283 EMERGENCY DEPT VISIT LOW MDM: CPT

## 2021-08-26 PROCEDURE — 72125 CT NECK SPINE W/O DYE: CPT | Mod: 26,MA

## 2021-08-26 PROCEDURE — 72170 X-RAY EXAM OF PELVIS: CPT | Mod: 26

## 2021-08-26 PROCEDURE — 12013 RPR F/E/E/N/L/M 2.6-5.0 CM: CPT

## 2021-08-26 PROCEDURE — 70450 CT HEAD/BRAIN W/O DYE: CPT | Mod: 26,MA

## 2021-08-26 PROCEDURE — 74177 CT ABD & PELVIS W/CONTRAST: CPT | Mod: 26,MA

## 2021-08-26 PROCEDURE — 71260 CT THORAX DX C+: CPT | Mod: 26,MA

## 2021-08-26 PROCEDURE — 99285 EMERGENCY DEPT VISIT HI MDM: CPT | Mod: 25

## 2021-08-26 RX ORDER — MIDAZOLAM HYDROCHLORIDE 1 MG/ML
2 INJECTION, SOLUTION INTRAMUSCULAR; INTRAVENOUS ONCE
Refills: 0 | Status: DISCONTINUED | OUTPATIENT
Start: 2021-08-26 | End: 2021-08-26

## 2021-08-26 RX ORDER — LEVETIRACETAM 250 MG/1
1000 TABLET, FILM COATED ORAL ONCE
Refills: 0 | Status: COMPLETED | OUTPATIENT
Start: 2021-08-26 | End: 2021-08-26

## 2021-08-26 RX ORDER — HALOPERIDOL DECANOATE 100 MG/ML
5 INJECTION INTRAMUSCULAR ONCE
Refills: 0 | Status: DISCONTINUED | OUTPATIENT
Start: 2021-08-26 | End: 2021-08-26

## 2021-08-26 RX ORDER — TETANUS TOXOID, REDUCED DIPHTHERIA TOXOID AND ACELLULAR PERTUSSIS VACCINE, ADSORBED 5; 2.5; 8; 8; 2.5 [IU]/.5ML; [IU]/.5ML; UG/.5ML; UG/.5ML; UG/.5ML
0.5 SUSPENSION INTRAMUSCULAR ONCE
Refills: 0 | Status: COMPLETED | OUTPATIENT
Start: 2021-08-26 | End: 2021-08-26

## 2021-08-26 RX ORDER — SODIUM CHLORIDE 9 MG/ML
1000 INJECTION INTRAMUSCULAR; INTRAVENOUS; SUBCUTANEOUS ONCE
Refills: 0 | Status: COMPLETED | OUTPATIENT
Start: 2021-08-26 | End: 2021-08-26

## 2021-08-26 RX ORDER — SODIUM CHLORIDE 9 MG/ML
2000 INJECTION, SOLUTION INTRAVENOUS ONCE
Refills: 0 | Status: COMPLETED | OUTPATIENT
Start: 2021-08-26 | End: 2021-08-26

## 2021-08-26 RX ORDER — HALOPERIDOL DECANOATE 100 MG/ML
5 INJECTION INTRAMUSCULAR ONCE
Refills: 0 | Status: COMPLETED | OUTPATIENT
Start: 2021-08-26 | End: 2021-08-26

## 2021-08-26 RX ADMIN — SODIUM CHLORIDE 2000 MILLILITER(S): 9 INJECTION, SOLUTION INTRAVENOUS at 15:32

## 2021-08-26 RX ADMIN — HALOPERIDOL DECANOATE 5 MILLIGRAM(S): 100 INJECTION INTRAMUSCULAR at 16:35

## 2021-08-26 RX ADMIN — TETANUS TOXOID, REDUCED DIPHTHERIA TOXOID AND ACELLULAR PERTUSSIS VACCINE, ADSORBED 0.5 MILLILITER(S): 5; 2.5; 8; 8; 2.5 SUSPENSION INTRAMUSCULAR at 12:10

## 2021-08-26 RX ADMIN — MIDAZOLAM HYDROCHLORIDE 2 MILLIGRAM(S): 1 INJECTION, SOLUTION INTRAMUSCULAR; INTRAVENOUS at 12:10

## 2021-08-26 RX ADMIN — MIDAZOLAM HYDROCHLORIDE 2 MILLIGRAM(S): 1 INJECTION, SOLUTION INTRAMUSCULAR; INTRAVENOUS at 16:35

## 2021-08-26 RX ADMIN — LEVETIRACETAM 400 MILLIGRAM(S): 250 TABLET, FILM COATED ORAL at 12:10

## 2021-08-26 RX ADMIN — SODIUM CHLORIDE 1000 MILLILITER(S): 9 INJECTION INTRAMUSCULAR; INTRAVENOUS; SUBCUTANEOUS at 12:10

## 2021-08-26 NOTE — ED ADULT NURSE NOTE - NSICDXFAMILYHX_GEN_ALL_CORE_FT
FAMILY HISTORY:  No pertinent family history in first degree relatives    Mother  Still living? Unknown  Family history of essential hypertension, Age at diagnosis: Age Unknown

## 2021-08-26 NOTE — CONSULT NOTE ADULT - SUBJECTIVE AND OBJECTIVE BOX
TRAUMA ACTIVATION LEVEL:  ALERT   ACTIVATED BY: EMS  INTUBATED: NO      MECHANISM OF INJURY:   [] Blunt     [] MVC	  [x] Fall	  [] Pedestrian Struck	  [] Motorcycle     [] Assault     [] Bicycle collision    [] Sports injury    [] Penetrating    [] Gun Shot Wound      [] Stab Wound    GCS: 14 	E: 4	V: 4	M: 6    HPI:    36yM w/ PMHx of schizophrenia, bipolar d/o, intermittent drug use, and seizure disorder on Depakote (last dose taken 2 days ago) seen as Trauma Alert s/p witnessed seizure. BIBEMS for witnessed seizure in the street, as per ems pt was with friends walking to get food, at that time pt began to have seizure, collapsed to floor, (+)head trauma, sustained laceration to right forehead. Trauma assessment in ED: ABCs intact , GCS 14 , AAOx2.    PAST MEDICAL & SURGICAL HISTORY:  Seizures    Seizure    Schizophrenia    Hep C w/o coma, chronic    No significant past surgical history    No significant past surgical history    Allergies    No Known Allergies    Intolerances      Home Medications:  clonazePAM 0.5 mg oral tablet: 1 tab(s) orally every 12 hours, As needed, anxiety (05 Mar 2020 10:21)  divalproex sodium: 1000  orally once a day (at bedtime) (12 May 2021 14:50)  divalproex sodium 500 mg oral delayed release tablet: 1 tab(s) orally in am (12 May 2021 14:50)  Keppra: 1500 tab(s) orally once a day (at bedtime) (12 May 2021 14:50)  levETIRAcetam 1000 mg oral tablet: 1 tab(s) orally once a day in am (12 May 2021 14:50)  RisperDAL Consta 37.5 mg/2 weeks intramuscular injection, extended release:  (05 Mar 2020 10:21)  RisperDAL Consta 37.5 mg/2 weeks intramuscular injection, extended release: 2 milliliter(s) intramuscular every 2 weeks (12 May 2021 14:50)  Vitamin D3 1000 intl units (25 mcg) oral tablet: 1 tab(s) orally once a day (05 Mar 2020 10:21)  Vitamin D3 2000 intl units (50 mcg) oral tablet:  (12 May 2021 14:50)      ROS: 10-system review is otherwise negative except HPI above.      Primary Survey:    A - airway intact  B - bilateral breath sounds and good chest rise  C - palpable pulses in all extremities  D - GCS 14 on arrival, CABRALES  Exposure obtained    Vital Signs Last 24 Hrs  T(C): 36.7 (26 Aug 2021 11:31), Max: 36.7 (26 Aug 2021 11:31)  T(F): 98.1 (26 Aug 2021 11:31), Max: 98.1 (26 Aug 2021 11:31)  HR: 72 (26 Aug 2021 11:) (72 - 72)  BP: 130/68 (26 Aug 2021 11:31) (130/68 - 130/68)  BP(mean): --  RR: 18 (26 Aug 2021 11:31) (18 - 18)  SpO2: 99% (26 Aug 2021 11:31) (99% - 99%)    Secondary Survey:   General: NAD  HEENT: Normocephalic, atraumatic, EOMI, PEERLA. no scalp lacerations   Neck: Soft, midline trachea. no c-spine tenderness  Chest: No chest wall tenderness, no subcutaneous emphysema   Cardiac: S1, S2, RRR  Respiratory: Bilateral breath sounds, clear and equal bilaterally  Abdomen: Soft, non-distended, non-tender, no rebound, no guarding.  Groin: Normal appearing, pelvis stable   Ext:  Moving b/l upper and lower extremities. Palpable Radial b/l UE, b/l DP palpable in LE.   Back: No T/L/S spine tenderness, No palpable runoff/stepoff/deformity  Rectal: No vicente blood, VANESA with good tone    FAST:     ACCESS / DEVICES:  [x] Peripheral IV  [ ] Central Venous Line	[ ] R	[ ] L	[ ] IJ	[ ] Fem	[ ] SC	Placed:   [ ] Arterial Line		[ ] R	[ ] L	[ ] Fem	[ ] Rad	[ ] Ax	Placed:   [ ] PICC:					[ ] Mediport  [ ] Urinary Catheter,  Date Placed:   [ ] Chest tube: [ ] Right, [ ] Left  [ ] ELIER/Jian Drains    Labs:  CAPILLARY BLOOD GLUCOSE                              16.8   8.60  )-----------( 167      ( 26 Aug 2021 11:14 )             52.0       Auto Neutrophil %: 46.6 % (21 @ 11:14)  Auto Immature Granulocyte %: 0.8 % (21 @ 11:14)        142  |  99  |  18  ----------------------------<  108<H>  4.1   |  10<L>  |  1.3      Calcium, Total Serum: 10.0 mg/dL (21 @ 11:14)      LFTs:             7.5  | 0.3  | 19       ------------------[23      ( 26 Aug 2021 11:14 )  4.7  | x    | 13          Lipase:36     Amylase:x         Lactate, Blood: 21.5 mmol/L (21 @ 11:14)      Coags:     11.20  ----< 0.97    ( 26 Aug 2021 11:14 )     32.7              Alcohol, Blood: <10 mg/dL (21 @ 11:14)    Urinalysis Basic - ( 26 Aug 2021 11:32 )    Color: Colorless / Appearance: Clear / S.016 / pH: x  Gluc: x / Ketone: Small  / Bili: Negative / Urobili: <2 mg/dL   Blood: x / Protein: Trace / Nitrite: Negative   Leuk Esterase: Negative / RBC: x / WBC x   Sq Epi: x / Non Sq Epi: x / Bacteria: x        Alcohol, Blood: <10 mg/dL (21 @ 11:14)      RADIOLOGY & ADDITIONAL STUDIES:  ---------------------------------------------------------------------------------------    < from: CT Head No Cont (21 @ 11:31) >  No evidence of acute intracranial pathology.    < end of copied text >  < from: CT Cervical Spine No Cont (21 @ 11:38) >  No evidence of acute cervical spine fracture or subluxation. Mild degenerative changes.    < end of copied text >  < from: CT Chest w/ IV Cont (21 @ 11:42) >  1. No definite evidence of acute traumatic injury within the chest, abdomen, or pelvis.    2. Trace pelvic ascites, of uncertain clinical significance.    < end of copied text >

## 2021-08-26 NOTE — CONSULT NOTE ADULT - CONSULT REASON
LOV 11/22/19. Per note, \" Surgery would include right shoulder arthroscopic subacromial decompression, distal clavicle excision and biceps tenotomy with other work as indicated...  She will call to let us know if she is interested in surgery. \"    Routed to MD to place surgical orders.   
The patient called to let Dr. Brower know that she would like to move forward with the surgery. Per LOV the patient was to call back to let her know. Please advise.  
witnessed sx and subsequent fall

## 2021-08-26 NOTE — CONSULT NOTE ADULT - ASSESSMENT
ASSESSMENT:    36yM w/ PMHx of schizophrenia, bipolar d/o, intermittent drug use, and seizure disorder on Depakote (last dose taken 2 days ago) seen as Trauma Alert s/p witnessed seizure. BIBEMS for witnessed seizure in the street, as per ems pt was with friends walking to get food, at that time pt began to have seizure, collapsed to floor, (+)head trauma, sustained laceration to right forehead. Trauma assessment in ED: ABCs intact , GCS 14 , AAOx2.     Physical exam findings, imaging, and labs as documented above, injuries are identified: right forehead laceration    PLAN:    CTs reviewed, as above. No acute traumatic findings  Lac repair completed by ED  No acute trauma surgical intervention  Plain film wet reads as per ED  Recommend admission to medicine/neurology for sz workup and mgmt  Trauma team to perform TTS in AM  Surgical Attending aware and agrees with plan      Lines/Tubes: PIV    Above plan discussed with Dr. Santa

## 2021-08-26 NOTE — ED PROVIDER NOTE - PHYSICAL EXAMINATION
GENERAL: Well-nourished, Well-developed. NAD.  HEAD: No visible or palpable bumps or hematomas. No ecchymosis behind ears B/L.  Eyes: PERRLA, EOMI. No asymmetry. No nystagmus. No conjunctival injection. Non-icteric sclera.  ENMT: MMM.   Neck: Supple. No cervical midline TTP. No paravertebral TTP to traps. FROM  CVS: RRR. Normal S1,S2. No murmurs appreciated on auscultation   RESP: No use of accessory muscles. Chest rise symmetrical with good expansion. Lungs clear to auscultation B/L. No wheezing, rales, or rhonchi auscultated.  GI: Normal auscultation of bowel sounds in all 4 quadrants. Soft, Nontender, Nondistended. No guarding or rebound tenderness. No CVAT B/L.  MSK: Extremities w/o deformity or ttp. No visible signs of trauma such as ecchymosis, erythema, or swelling. FROM of upper and lower extremities B/L. No midline spinal TTP. FROM of back with flexion and extension.  Skin: (+)5cm laceration R forehead  EXT: Radial and pedal pulses present B/L. No calf tenderness or swelling B/L. No palpable cords. No pedal edema B/L.  Neuro: AA&O x 2. Speaking in full sentences. No facial droop. No tremors. Sensation grossly intact. Strength 5/5 B/L. GENERAL: Well-nourished, Well-developed.   HEAD: No visible or palpable bumps or hematomas. No ecchymosis behind ears B/L.  Eyes: PERRLA, EOMI. No asymmetry. No nystagmus. No conjunctival injection. Non-icteric sclera.  ENMT: MMM.   Neck: Supple. No cervical midline TTP. No paravertebral TTP to traps. FROM  CVS: RRR. Normal S1,S2. No murmurs appreciated on auscultation   RESP: No use of accessory muscles. Chest rise symmetrical with good expansion. Lungs clear to auscultation B/L. No wheezing, rales, or rhonchi auscultated.  GI: Normal auscultation of bowel sounds in all 4 quadrants. Soft, Nontender, Nondistended. No guarding or rebound tenderness. No CVAT B/L.  MSK: Extremities w/o deformity or ttp. No visible signs of trauma such as ecchymosis, erythema, or swelling. FROM of upper and lower extremities B/L. No midline spinal TTP. FROM of back with flexion and extension.  Skin: (+)5cm laceration R forehead  EXT: Radial and pedal pulses present B/L. No calf tenderness or swelling B/L. No palpable cords. No pedal edema B/L.  Neuro: AA&O x 2. Speaking in full sentences. No facial droop. No tremors. Sensation grossly intact. Strength 5/5 B/L.

## 2021-08-26 NOTE — ED PROVIDER NOTE - ATTENDING CONTRIBUTION TO CARE
I have personally evaluated this patient. I have seen this patient with a medical scribe, please see PROGRESS NOTE for my contribution to care. I have reviewed scribe notes which are accurate.  I was present for and supervised the key and critical aspects of the procedures performed during the care of the patient.

## 2021-08-26 NOTE — ED PROVIDER NOTE - PROGRESS NOTE DETAILS
ATTENDING NOTE:   35 y/o M (HX obtained from prior chart) with PMH of schizophrenia, bipolar, intermittent drug use, and seizure disorder on Depakote (last dose taken 2 days ago) presents s/p witnessed seizure on the street sustaining a head laceration. Per EMS, him and his friends were walking to go get breakfast when the pt collapsed and then began seizing. Friends left after EMS arrival and did not provide details of the seizure. Upon initial evaluation pt was post-ictal, unable to provide HX; pt was only able to identify his name but could not answer other questions, however in ED pt provided his name and . FS was 108 in the field. /67 upon arrival, saturating 98% on room air.   VITAL SIGNS: I have reviewed nursing notes and confirm. CONSTITUTIONAL: non-toxic, well appearing. SKIN: (+) 5cm laceration above R eyebrow the the forehead, no rash, no petechiae. EYES: PERRL, EOMI, pink conjunctiva, anicteric. ENT: tongue midline, age indeterminant R upper incisor broken, chronic appearing, no exudates, pupils 2mm and reactive to light, MMM. NECK: Supple; no meningismus, no JVD, no bony step-offs or deformities, no midline C-T-L spine TTP. CARD: RRR, no murmurs, equal radial pulses bilaterally 2+. RESP: CTAB, no respiratory distress. ABD: Soft, non-tender, non-distended, no peritoneal signs, no HSM, no CVA tenderness. EXT: Normal ROM x4. No edema. No calves tenderness. NEURO: Alert, oriented. CN2-12 intact, equal strength bilaterally, nl gait. PSYCH: Cooperative, appropriate.  35 y/o M with PMH of seizure disorder presents s/p witnessed seizure. Plan for:  -Keppra  -labs  -urine  -imaging  -reassess ER: of note, pt states that he has not taken any of his seizure medications for the past two days. ATTENDING NOTE:   37 y/o M (HX obtained from prior chart) with PMH of schizophrenia, bipolar, intermittent drug use, and seizure disorder on Depakote (last dose taken 2 days ago) presents s/p witnessed seizure on the street sustaining a head laceration. Per EMS, him and his friends were walking to go get breakfast when the pt collapsed and then began seizing. Friends left after EMS arrival and did not provide details of the seizure. Upon initial evaluation pt was post-ictal, unable to provide HX; pt was only able to identify his name but could not answer other questions, however in ED pt provided his name and . FS was 108 in the field. /67 upon arrival, saturating 98% on room air.   VITAL SIGNS: I have reviewed nursing notes and confirm. CONSTITUTIONAL: non-toxic, well appearing. SKIN: (+) 5cm laceration above R eyebrow the the forehead, no rash, no petechiae. EYES: PERRL, EOMI, pink conjunctiva, anicteric. ENT: tongue midline, age indeterminant R upper incisor broken, chronic appearing, no exudates, pupils 2mm and reactive to light, MMM. NECK: Supple; no meningismus, no JVD, no bony step-offs or deformities, no midline C-T-L spine TTP. CARD: RRR, no murmurs, equal radial pulses bilaterally 2+. RESP: CTAB, no respiratory distress. ABD: Soft, non-tender, non-distended, no peritoneal signs, no HSM, no CVA tenderness. EXT: Normal ROM x4. No edema. No calves tenderness. NEURO: Alert, oriented. CN2-12 intact, equal strength bilaterally PSYCH: Cooperative, appropriate.  37 y/o M with PMH of seizure disorder presents s/p witnessed seizure. Plan for:  -Keppra  -labs  -urine  -imaging  -reassess

## 2021-08-26 NOTE — ED ADULT TRIAGE NOTE - CHIEF COMPLAINT QUOTE
Patient BIBEMS for witnessed seizure, GCS 14 at this time. Laceration present to right forehead, dental injury noted.

## 2021-08-26 NOTE — ED PROVIDER NOTE - OBJECTIVE STATEMENT
37 y/o M pmhx of schizophrenia, bipolar d/o, intermittent drug use, and seizure disorder on Depakote (last dose taken 2 days ago) BIBEMS for witnessed seizure in the street, as per ems pt was with friends walking to get food, at that time pt began to have seizure, collapsed to floor, (+)head trauma, sustained laceration to right forehead. Upon arrival to ED pt awake, alert, A&Ox2, EMS states he was only A&Ox1 prior to arrival. Unable to obtain comprehensive ros due to condition.

## 2021-08-26 NOTE — ED ADULT NURSE NOTE - CHIEF COMPLAINT QUOTE
Pt BIBEMS for witnessed outdoor seizure and fall. Pt BIBEMS for witnessed outdoor seizure and fall. LAc present to right forehead. GCS 14, pt confused at this time, stating it's december.

## 2021-08-26 NOTE — CHART NOTE - NSCHARTNOTEFT_GEN_A_CORE
I was called by the RN in the ED about the patient and that he wanted to leave the hospital. I went to the ED to assess the patient and the patient was A&O*3 and fully aware of why he is in the hospital and what brought him in. Patient expressed understanding of the fact that he was brought to the ED because he had a seizure that was due to medication noncompliance. His levels of the antiepileptic medications were check when he was in the ED.     Patient explained in his own words that he had a seizure disorder and he take AED at home ( he stated the name and the fact that he has supplies at home ). He repeated and expressed understanding of his condition and the consequences of not taking his medications at home and wanted to leave the hospital from the ED.

## 2021-08-26 NOTE — ED ADULT NURSE REASSESSMENT NOTE - NS ED NURSE REASSESS COMMENT FT1
patient states he wants to be discharged. MD at bedside to assess patient. as per MAR [phone] pt has full mental capacity and can leave AMA

## 2021-08-26 NOTE — ED ADULT NURSE NOTE - NSIMPLEMENTINTERV_GEN_ALL_ED
Implemented All Fall with Harm Risk Interventions:  [place] to call system. Call bell, personal items and telephone within reach. Instruct patient to call for assistance. Room bathroom lighting operational. Non-slip footwear when patient is off stretcher. Physically safe environment: no spills, clutter or unnecessary equipment. Stretcher in lowest position, wheels locked, appropriate side rails in place. Provide visual cue, wrist band, yellow gown, etc. Monitor gait and stability. Monitor for mental status changes and reorient to person, place, and time. Review medications for side effects contributing to fall risk. Reinforce activity limits and safety measures with patient and family. Provide visual clues: red socks.

## 2021-08-26 NOTE — ED ADULT NURSE NOTE - OBJECTIVE STATEMENT
Pt BIBEMS for witnessed seizure and fall on concrete. On assessment pt has laceration of frontal forehead. Pt BIBEMS for witnessed seizure and fall on concrete by friends. On assessment pt has laceration of frontal forehead. Urinary incontinence noted. Chipped teeth present. Unsure AC.

## 2021-08-26 NOTE — ED PROVIDER NOTE - CLINICAL SUMMARY MEDICAL DECISION MAKING FREE TEXT BOX
37 y/o M with PMH of seizure disorder presents s/p witnessed seizure. Plan for:  -Keppra  -labs  -urine  -imaging  -admit

## 2021-09-09 ENCOUNTER — EMERGENCY (EMERGENCY)
Facility: HOSPITAL | Age: 36
LOS: 0 days | Discharge: HOME | End: 2021-09-09
Attending: EMERGENCY MEDICINE | Admitting: EMERGENCY MEDICINE
Payer: MEDICAID

## 2021-09-09 VITALS
SYSTOLIC BLOOD PRESSURE: 123 MMHG | WEIGHT: 149.91 LBS | HEART RATE: 74 BPM | HEIGHT: 66 IN | RESPIRATION RATE: 16 BRPM | OXYGEN SATURATION: 96 % | TEMPERATURE: 98 F | DIASTOLIC BLOOD PRESSURE: 86 MMHG

## 2021-09-09 VITALS
HEART RATE: 59 BPM | DIASTOLIC BLOOD PRESSURE: 71 MMHG | RESPIRATION RATE: 18 BRPM | SYSTOLIC BLOOD PRESSURE: 120 MMHG | TEMPERATURE: 98 F | OXYGEN SATURATION: 100 %

## 2021-09-09 DIAGNOSIS — Z86.69 PERSONAL HISTORY OF OTHER DISEASES OF THE NERVOUS SYSTEM AND SENSE ORGANS: ICD-10-CM

## 2021-09-09 DIAGNOSIS — M54.5 LOW BACK PAIN: ICD-10-CM

## 2021-09-09 DIAGNOSIS — Z79.899 OTHER LONG TERM (CURRENT) DRUG THERAPY: ICD-10-CM

## 2021-09-09 DIAGNOSIS — F20.9 SCHIZOPHRENIA, UNSPECIFIED: ICD-10-CM

## 2021-09-09 DIAGNOSIS — M54.9 DORSALGIA, UNSPECIFIED: ICD-10-CM

## 2021-09-09 PROCEDURE — 99284 EMERGENCY DEPT VISIT MOD MDM: CPT

## 2021-09-09 RX ORDER — METHOCARBAMOL 500 MG/1
2 TABLET, FILM COATED ORAL
Qty: 18 | Refills: 0
Start: 2021-09-09 | End: 2021-09-11

## 2021-09-09 RX ORDER — METHOCARBAMOL 500 MG/1
1500 TABLET, FILM COATED ORAL ONCE
Refills: 0 | Status: COMPLETED | OUTPATIENT
Start: 2021-09-09 | End: 2021-09-09

## 2021-09-09 RX ORDER — KETOROLAC TROMETHAMINE 30 MG/ML
30 SYRINGE (ML) INJECTION ONCE
Refills: 0 | Status: DISCONTINUED | OUTPATIENT
Start: 2021-09-09 | End: 2021-09-09

## 2021-09-09 RX ORDER — IBUPROFEN 200 MG
1 TABLET ORAL
Qty: 9 | Refills: 0
Start: 2021-09-09 | End: 2021-09-11

## 2021-09-09 RX ADMIN — Medication 30 MILLIGRAM(S): at 13:26

## 2021-09-09 RX ADMIN — METHOCARBAMOL 1500 MILLIGRAM(S): 500 TABLET, FILM COATED ORAL at 13:26

## 2021-09-09 NOTE — ED PROVIDER NOTE - OBJECTIVE STATEMENT
37 yo male w/ PMH of epilepsy, schizophrenia presents for back pain x 2 days.  Had seizure 2 days ago, no head trauma, follows up with neuro for seizures and taking keppra, started having back pain after, sharp pain, worse with movement, better with rest, right lower lumbar region, no radiation, denies having had before.  No saddle anesthesia, bladder/bowel incontinence, urinary retention, midline TTP, fevers, paralysis, N/T.

## 2021-09-09 NOTE — ED PROVIDER NOTE - PHYSICAL EXAMINATION
GENERAL: Well-developed; well-nourished; in no acute distress.   SKIN: warm, dry  HEAD: Normocephalic; atraumatic.  EYES: PERRLA, EOMI  CARD: S1, S2 normal; no murmurs, gallops, or rubs. Regular rate and rhythm.   RESP: LCTAB; No wheezes, rales, rhonchi, or stridor.  ABD: soft, nontender, and nondistended  BACK: Right lower lumbar paravertebral TTP. No midline TTP   NEURO: Alert, oriented, grossly unremarkable. Strength 5/5 and sensation intact in bilateral LEs   PSYCH: Cooperative, appropriate.

## 2021-09-09 NOTE — ED ADULT NURSE NOTE - OBJECTIVE STATEMENT
pt c/o lower back pain after injuring it during seizure 2 days ago. denies any other injury. pt is alert and calm . denes numbness/tingling

## 2021-09-09 NOTE — ED PROVIDER NOTE - PATIENT PORTAL LINK FT
You can access the FollowMyHealth Patient Portal offered by [facility] by registering at the following website: [email or web address]. By joining [employer]’s FollowMyHealth portal, you will also be able to view your health information using other applications (apps) compatible with our system.

## 2021-09-09 NOTE — ED PROVIDER NOTE - NS ED ROS FT
Constitutional: No fevers, chills, or malaise.  HEENT: No headache, visual changes  Cardiac:  No chest pain, SOB  Respiratory:  No cough, respiratory distress, or hemoptysis.  GI:  No nausea, vomiting, diarrhea, or abdominal pain.  :  No dysuria, frequency, or urgency.  MS:  Reports back pain   Neuro:  No dizziness, LOC  Skin:  No skin rash.   Endocrine: No polyuria, polyphagia, or polydipsia.

## 2021-09-09 NOTE — ED ADULT NURSE NOTE - CHIEF COMPLAINT QUOTE
Pt is a resident at [facility], states he had an unwitnessed seizure x2 days ago and fell, now c/o back pain. Denies other symptoms. Pt has PMH epilepsy, schizophrenia, and anxiety.

## 2021-09-09 NOTE — ED PROVIDER NOTE - ATTENDING CONTRIBUTION TO CARE
35 yo m with pmh of seizures and schizophrenia presents with lower back pain.  pt says started after having a seizure 2 days ago.  denies head injury.  no midline back pain, saddle anesthesia, weakness, bladder incontinence/retention.  exam: nad, ncat, perrl, eomi, mmm, rrr, ctab, abd soft, nt,nd aox3, +b/l paravert muscles in lumbar region, no midline ttp imp: pt with msk back pain, symptomatic tx

## 2021-09-09 NOTE — ED ADULT TRIAGE NOTE - CHIEF COMPLAINT QUOTE
Pt is a resident at [facility] [place], states he had an unwitnessed seizure x2 days ago and fell, now c/o back pain. Denies other symptoms. Pt has PMH epilepsy, schizophrenia, and anxiety.

## 2021-09-09 NOTE — ED PROVIDER NOTE - CARE PROVIDER_API CALL
Shellie Braden)  Internal Medicine  [address] [place] [address], [address] [place] [address]  Phone: [phone]  Fax: [phone]  Follow Up Time: 1-3 Days

## 2021-09-23 NOTE — ED PROVIDER NOTE - DATE/TIME 1
24-Sep-2020 11:30 Nsaids Pregnancy And Lactation Text: These medications are considered safe up to 30 weeks gestation. It is excreted in breast milk.

## 2021-10-10 ENCOUNTER — EMERGENCY (EMERGENCY)
Facility: HOSPITAL | Age: 36
LOS: 0 days | Discharge: HOME | End: 2021-10-10
Attending: EMERGENCY MEDICINE | Admitting: EMERGENCY MEDICINE
Payer: MEDICAID

## 2021-10-10 VITALS
RESPIRATION RATE: 16 BRPM | HEART RATE: 82 BPM | OXYGEN SATURATION: 98 % | SYSTOLIC BLOOD PRESSURE: 124 MMHG | DIASTOLIC BLOOD PRESSURE: 81 MMHG | TEMPERATURE: 97 F | WEIGHT: 149.91 LBS | HEIGHT: 66 IN

## 2021-10-10 DIAGNOSIS — R56.9 UNSPECIFIED CONVULSIONS: ICD-10-CM

## 2021-10-10 DIAGNOSIS — G40.909 EPILEPSY, UNSPECIFIED, NOT INTRACTABLE, WITHOUT STATUS EPILEPTICUS: ICD-10-CM

## 2021-10-10 DIAGNOSIS — F20.9 SCHIZOPHRENIA, UNSPECIFIED: ICD-10-CM

## 2021-10-10 LAB
ALBUMIN SERPL ELPH-MCNC: 4.5 G/DL — SIGNIFICANT CHANGE UP (ref 3.5–5.2)
ALP SERPL-CCNC: 21 U/L — LOW (ref 30–115)
ALT FLD-CCNC: 13 U/L — SIGNIFICANT CHANGE UP (ref 0–41)
ANION GAP SERPL CALC-SCNC: 16 MMOL/L — HIGH (ref 7–14)
APAP SERPL-MCNC: <5 UG/ML — LOW (ref 10–30)
AST SERPL-CCNC: 15 U/L — SIGNIFICANT CHANGE UP (ref 0–41)
BASOPHILS # BLD AUTO: 0.01 K/UL — SIGNIFICANT CHANGE UP (ref 0–0.2)
BASOPHILS NFR BLD AUTO: 0.2 % — SIGNIFICANT CHANGE UP (ref 0–1)
BILIRUB SERPL-MCNC: 0.3 MG/DL — SIGNIFICANT CHANGE UP (ref 0.2–1.2)
BUN SERPL-MCNC: 16 MG/DL — SIGNIFICANT CHANGE UP (ref 10–20)
CALCIUM SERPL-MCNC: 9.6 MG/DL — SIGNIFICANT CHANGE UP (ref 8.5–10.1)
CHLORIDE SERPL-SCNC: 102 MMOL/L — SIGNIFICANT CHANGE UP (ref 98–110)
CO2 SERPL-SCNC: 21 MMOL/L — SIGNIFICANT CHANGE UP (ref 17–32)
CREAT SERPL-MCNC: 1.1 MG/DL — SIGNIFICANT CHANGE UP (ref 0.7–1.5)
EOSINOPHIL # BLD AUTO: 0.03 K/UL — SIGNIFICANT CHANGE UP (ref 0–0.7)
EOSINOPHIL NFR BLD AUTO: 0.5 % — SIGNIFICANT CHANGE UP (ref 0–8)
ETHANOL SERPL-MCNC: <10 MG/DL — SIGNIFICANT CHANGE UP
GLUCOSE SERPL-MCNC: 71 MG/DL — SIGNIFICANT CHANGE UP (ref 70–99)
HCT VFR BLD CALC: 45.2 % — SIGNIFICANT CHANGE UP (ref 42–52)
HGB BLD-MCNC: 15.5 G/DL — SIGNIFICANT CHANGE UP (ref 14–18)
IMM GRANULOCYTES NFR BLD AUTO: 0.4 % — HIGH (ref 0.1–0.3)
LYMPHOCYTES # BLD AUTO: 2.03 K/UL — SIGNIFICANT CHANGE UP (ref 1.2–3.4)
LYMPHOCYTES # BLD AUTO: 36.3 % — SIGNIFICANT CHANGE UP (ref 20.5–51.1)
MAGNESIUM SERPL-MCNC: 1.8 MG/DL — SIGNIFICANT CHANGE UP (ref 1.8–2.4)
MCHC RBC-ENTMCNC: 31.8 PG — HIGH (ref 27–31)
MCHC RBC-ENTMCNC: 34.3 G/DL — SIGNIFICANT CHANGE UP (ref 32–37)
MCV RBC AUTO: 92.8 FL — SIGNIFICANT CHANGE UP (ref 80–94)
MONOCYTES # BLD AUTO: 0.46 K/UL — SIGNIFICANT CHANGE UP (ref 0.1–0.6)
MONOCYTES NFR BLD AUTO: 8.2 % — SIGNIFICANT CHANGE UP (ref 1.7–9.3)
NEUTROPHILS # BLD AUTO: 3.04 K/UL — SIGNIFICANT CHANGE UP (ref 1.4–6.5)
NEUTROPHILS NFR BLD AUTO: 54.4 % — SIGNIFICANT CHANGE UP (ref 42.2–75.2)
NRBC # BLD: 0 /100 WBCS — SIGNIFICANT CHANGE UP (ref 0–0)
PLATELET # BLD AUTO: 157 K/UL — SIGNIFICANT CHANGE UP (ref 130–400)
POTASSIUM SERPL-MCNC: 4.3 MMOL/L — SIGNIFICANT CHANGE UP (ref 3.5–5)
POTASSIUM SERPL-SCNC: 4.3 MMOL/L — SIGNIFICANT CHANGE UP (ref 3.5–5)
PROT SERPL-MCNC: 7.1 G/DL — SIGNIFICANT CHANGE UP (ref 6–8)
RBC # BLD: 4.87 M/UL — SIGNIFICANT CHANGE UP (ref 4.7–6.1)
RBC # FLD: 11.9 % — SIGNIFICANT CHANGE UP (ref 11.5–14.5)
SALICYLATES SERPL-MCNC: <0.3 MG/DL — LOW (ref 4–30)
SODIUM SERPL-SCNC: 139 MMOL/L — SIGNIFICANT CHANGE UP (ref 135–146)
WBC # BLD: 5.59 K/UL — SIGNIFICANT CHANGE UP (ref 4.8–10.8)
WBC # FLD AUTO: 5.59 K/UL — SIGNIFICANT CHANGE UP (ref 4.8–10.8)

## 2021-10-10 PROCEDURE — 99285 EMERGENCY DEPT VISIT HI MDM: CPT

## 2021-10-10 PROCEDURE — 93010 ELECTROCARDIOGRAM REPORT: CPT

## 2021-10-10 NOTE — ED ADULT NURSE NOTE - OBJECTIVE STATEMENT
37 y/o male Pt BIBEMS from [address] [place] [address] after a seizure this morning witnessed by his roommate; pt has epilepsy and did not take his Keppra yet this morning. As per roommate, pt was in bed when episode occurred; it lasted approx 7 mins, pt did not fall out of bed and did not sustain any trauma.

## 2021-10-10 NOTE — ED PROVIDER NOTE - CLINICAL SUMMARY MEDICAL DECISION MAKING FREE TEXT BOX
35 yo M presented to ED for breakthrough seizure. pt received morning meds. labs wnl including electrolytes. pt at baseline. SC home

## 2021-10-10 NOTE — ED ADULT TRIAGE NOTE - CHIEF COMPLAINT QUOTE
Pt BIBEMS from [facility] after a seizure this morning witnessed by his roommate; pt has epilepsy and did not take his Keppra yet this morning. As per roommate, pt was in bed when episode occurred; it lasted approx 7 mins, pt did not fall out of bed and did not sustain any trauma.

## 2021-10-10 NOTE — ED PROVIDER NOTE - PATIENT PORTAL LINK FT
You can access the FollowMyHealth Patient Portal offered by [facility] by registering at the following website: [email or web address]. By joining [employer]’s FollowMyHealth portal, you will also be able to view your health information using other applications (apps) compatible with our system.

## 2021-10-10 NOTE — ED ADULT TRIAGE NOTE - IDEAL BODY WEIGHT(KG)
64
I have personally seen and examined this patient. I have fully participated in the care of this patient. I have reviewed all pertinent clinical information, including history physical exam, plan and the Resident's note and agree except as noted

## 2021-10-10 NOTE — ED ADULT TRIAGE NOTE - SOURCE OF INFORMATION
3/14/19  MESSAGE ALREADY TAKEN CARE OF BY JENNA      ----- Message from Noemi Montiel sent at 3/14/2019  9:24 AM CDT -----  Contact: Self   Patient says she is at the eye doctor and would like to know if she cans get a letter sent over stating she can have her eyes dilated . Please call patient at [phone].  Fax: [phone].      Patient/EMS

## 2021-10-10 NOTE — ED PROVIDER NOTE - OBJECTIVE STATEMENT
37 yo male, pm of schizophrenia and seizure d/o on depakote, had witnessed seizure today, per pt took meds, has no complaints at this time, no specific pain or radiation, pt states last seizure was a few days ago. denies fever, chills, cp, sob, abd pain, nvd, numbness, tingling, dizziness.

## 2021-10-10 NOTE — ED PROVIDER NOTE - CARE PROVIDER_API CALL
Deandre De León)  EEGEpilepsy; Neurology  [address] [place] [address], [address]  [place] [address]  Phone: [phone]  Fax: [phone]  Follow Up Time: 1-3 Days

## 2021-10-10 NOTE — ED ADULT TRIAGE NOTE - TEMPERATURE IN FAHRENHEIT (DEGREES F)
Medication(s) Requested: Vyvanse 20 mg  Last office visit: 12/12/18  Last refill: 4/1/19  Is the patient due for refill of this medication(s): Yes  PDMP review: Criteria met. Forwarded to Physician/JOSE for signature.        97.2

## 2021-10-10 NOTE — ED ADULT NURSE NOTE - NSIMPLEMENTINTERV_GEN_ALL_ED
Implemented All Universal Safety Interventions:  [place] to call system. Call bell, personal items and telephone within reach. Instruct patient to call for assistance. Room bathroom lighting operational. Non-slip footwear when patient is off stretcher. Physically safe environment: no spills, clutter or unnecessary equipment. Stretcher in lowest position, wheels locked, appropriate side rails in place.

## 2021-10-10 NOTE — ED ADULT NURSE NOTE - CHIEF COMPLAINT QUOTE
Pt BIBEMS from [facility] after a seizure this morning witnessed by his roommate; pt has epilepsy and did not take his Keppra yet this morning. As per roommate, pt was in bed when episode occurred; it lasted approx 7 mins, pt did not fall out of bed and did not sustain any trauma.

## 2021-10-10 NOTE — ED PROVIDER NOTE - ATTENDING CONTRIBUTION TO CARE
35 yo M with PMH of seizures (keppra and depakote) presents to ED for seizure. Seizure witnessed by roommate. When pt woke up from his seizure he took his morning doses of antiepileptics. Pt states last seizure was a few days ago.   He feels back to his baseline and does not want any medications or blood work at this time.     Const: Well nourished, well developed, appears stated age  Eyes: PERRL, no conjunctival injection  HENT:  Neck supple without meningismus   CV: RRR, Warm, well-perfused extremities  RESP: CTA B/L, no tachypnea   GI: soft, non-tender, non-distended  MSK: No gross deformities appreciated  Skin: Warm, dry. No rashes  Neuro: Alert, CNs II-XII grossly intact. Sensation and motor function of extremities grossly intact.  Psych: Appropriate mood and affect.

## 2021-10-27 NOTE — DISCHARGE NOTE NURSING/CASE MANAGEMENT/SOCIAL WORK - PATIENT PORTAL LINK FT
You can access the FollowMyHealth Patient Portal offered by [facility] by registering at the following website: [email or web address]. By joining [employer]’s FollowMyHealth portal, you will also be able to view your health information using other applications (apps) compatible with our system. Bactrim Pregnancy And Lactation Text: This medication is Pregnancy Category D and is known to cause fetal risk.  It is also excreted in breast milk.

## 2021-11-21 ENCOUNTER — EMERGENCY (EMERGENCY)
Facility: HOSPITAL | Age: 36
LOS: 1 days | Discharge: HOME | End: 2021-11-23
Attending: EMERGENCY MEDICINE | Admitting: EMERGENCY MEDICINE
Payer: MEDICAID

## 2021-11-21 VITALS
OXYGEN SATURATION: 99 % | DIASTOLIC BLOOD PRESSURE: 76 MMHG | HEART RATE: 103 BPM | HEIGHT: 66 IN | TEMPERATURE: 99 F | SYSTOLIC BLOOD PRESSURE: 136 MMHG | RESPIRATION RATE: 16 BRPM

## 2021-11-21 LAB
ALBUMIN SERPL ELPH-MCNC: 4.5 G/DL — SIGNIFICANT CHANGE UP (ref 3.5–5.2)
ALP SERPL-CCNC: 21 U/L — LOW (ref 30–115)
ALT FLD-CCNC: 10 U/L — SIGNIFICANT CHANGE UP (ref 0–41)
ANION GAP SERPL CALC-SCNC: 20 MMOL/L — HIGH (ref 7–14)
AST SERPL-CCNC: 15 U/L — SIGNIFICANT CHANGE UP (ref 0–41)
BASOPHILS # BLD AUTO: 0.01 K/UL — SIGNIFICANT CHANGE UP (ref 0–0.2)
BASOPHILS NFR BLD AUTO: 0.2 % — SIGNIFICANT CHANGE UP (ref 0–1)
BILIRUB SERPL-MCNC: <0.2 MG/DL — SIGNIFICANT CHANGE UP (ref 0.2–1.2)
BUN SERPL-MCNC: 19 MG/DL — SIGNIFICANT CHANGE UP (ref 10–20)
CALCIUM SERPL-MCNC: 9.7 MG/DL — SIGNIFICANT CHANGE UP (ref 8.5–10.1)
CHLORIDE SERPL-SCNC: 102 MMOL/L — SIGNIFICANT CHANGE UP (ref 98–110)
CK SERPL-CCNC: 221 U/L — SIGNIFICANT CHANGE UP (ref 0–225)
CO2 SERPL-SCNC: 18 MMOL/L — SIGNIFICANT CHANGE UP (ref 17–32)
CREAT SERPL-MCNC: 1.3 MG/DL — SIGNIFICANT CHANGE UP (ref 0.7–1.5)
EOSINOPHIL # BLD AUTO: 0.02 K/UL — SIGNIFICANT CHANGE UP (ref 0–0.7)
EOSINOPHIL NFR BLD AUTO: 0.3 % — SIGNIFICANT CHANGE UP (ref 0–8)
GLUCOSE SERPL-MCNC: 87 MG/DL — SIGNIFICANT CHANGE UP (ref 70–99)
HCT VFR BLD CALC: 43.6 % — SIGNIFICANT CHANGE UP (ref 42–52)
HGB BLD-MCNC: 15.2 G/DL — SIGNIFICANT CHANGE UP (ref 14–18)
IMM GRANULOCYTES NFR BLD AUTO: 0.5 % — HIGH (ref 0.1–0.3)
LACTATE SERPL-SCNC: 7.8 MMOL/L — CRITICAL HIGH (ref 0.7–2)
LYMPHOCYTES # BLD AUTO: 1.97 K/UL — SIGNIFICANT CHANGE UP (ref 1.2–3.4)
LYMPHOCYTES # BLD AUTO: 31 % — SIGNIFICANT CHANGE UP (ref 20.5–51.1)
MAGNESIUM SERPL-MCNC: 1.8 MG/DL — SIGNIFICANT CHANGE UP (ref 1.8–2.4)
MCHC RBC-ENTMCNC: 33.2 PG — HIGH (ref 27–31)
MCHC RBC-ENTMCNC: 34.9 G/DL — SIGNIFICANT CHANGE UP (ref 32–37)
MCV RBC AUTO: 95.2 FL — HIGH (ref 80–94)
MONOCYTES # BLD AUTO: 0.54 K/UL — SIGNIFICANT CHANGE UP (ref 0.1–0.6)
MONOCYTES NFR BLD AUTO: 8.5 % — SIGNIFICANT CHANGE UP (ref 1.7–9.3)
NEUTROPHILS # BLD AUTO: 3.78 K/UL — SIGNIFICANT CHANGE UP (ref 1.4–6.5)
NEUTROPHILS NFR BLD AUTO: 59.5 % — SIGNIFICANT CHANGE UP (ref 42.2–75.2)
NRBC # BLD: 0 /100 WBCS — SIGNIFICANT CHANGE UP (ref 0–0)
PLATELET # BLD AUTO: 190 K/UL — SIGNIFICANT CHANGE UP (ref 130–400)
POTASSIUM SERPL-MCNC: 4.4 MMOL/L — SIGNIFICANT CHANGE UP (ref 3.5–5)
POTASSIUM SERPL-SCNC: 4.4 MMOL/L — SIGNIFICANT CHANGE UP (ref 3.5–5)
PROT SERPL-MCNC: 7 G/DL — SIGNIFICANT CHANGE UP (ref 6–8)
RBC # BLD: 4.58 M/UL — LOW (ref 4.7–6.1)
RBC # FLD: 11.8 % — SIGNIFICANT CHANGE UP (ref 11.5–14.5)
SARS-COV-2 RNA SPEC QL NAA+PROBE: SIGNIFICANT CHANGE UP
SODIUM SERPL-SCNC: 140 MMOL/L — SIGNIFICANT CHANGE UP (ref 135–146)
VALPROATE SERPL-MCNC: 44 UG/ML — LOW (ref 50–100)
WBC # BLD: 6.35 K/UL — SIGNIFICANT CHANGE UP (ref 4.8–10.8)
WBC # FLD AUTO: 6.35 K/UL — SIGNIFICANT CHANGE UP (ref 4.8–10.8)

## 2021-11-21 PROCEDURE — 99285 EMERGENCY DEPT VISIT HI MDM: CPT

## 2021-11-21 PROCEDURE — 99282 EMERGENCY DEPT VISIT SF MDM: CPT

## 2021-11-21 PROCEDURE — 93010 ELECTROCARDIOGRAM REPORT: CPT

## 2021-11-21 RX ORDER — RISPERIDONE 4 MG/1
2 TABLET ORAL
Qty: 0 | Refills: 0 | DISCHARGE

## 2021-11-21 RX ORDER — DIVALPROEX SODIUM 500 MG/1
1000 TABLET, DELAYED RELEASE ORAL
Qty: 0 | Refills: 0 | DISCHARGE

## 2021-11-21 RX ORDER — CHOLECALCIFEROL (VITAMIN D3) 125 MCG
0 CAPSULE ORAL
Qty: 0 | Refills: 0 | DISCHARGE

## 2021-11-21 RX ORDER — DIVALPROEX SODIUM 500 MG/1
1 TABLET, DELAYED RELEASE ORAL
Qty: 0 | Refills: 0 | DISCHARGE

## 2021-11-21 RX ORDER — DIAZEPAM 5 MG
4 TABLET ORAL ONCE
Refills: 0 | Status: DISCONTINUED | OUTPATIENT
Start: 2021-11-21 | End: 2021-11-21

## 2021-11-21 RX ORDER — RISPERIDONE 4 MG/1
0 TABLET ORAL
Qty: 0 | Refills: 0 | DISCHARGE

## 2021-11-21 RX ORDER — MAGNESIUM SULFATE 500 MG/ML
1 VIAL (ML) INJECTION ONCE
Refills: 0 | Status: COMPLETED | OUTPATIENT
Start: 2021-11-21 | End: 2021-11-21

## 2021-11-21 RX ORDER — MIDAZOLAM HYDROCHLORIDE 1 MG/ML
4 INJECTION, SOLUTION INTRAMUSCULAR; INTRAVENOUS ONCE
Refills: 0 | Status: DISCONTINUED | OUTPATIENT
Start: 2021-11-21 | End: 2021-11-21

## 2021-11-21 RX ORDER — MIDAZOLAM HYDROCHLORIDE 1 MG/ML
2 INJECTION, SOLUTION INTRAMUSCULAR; INTRAVENOUS ONCE
Refills: 0 | Status: DISCONTINUED | OUTPATIENT
Start: 2021-11-21 | End: 2021-11-21

## 2021-11-21 RX ORDER — LEVETIRACETAM 250 MG/1
1000 TABLET, FILM COATED ORAL DAILY
Refills: 0 | Status: DISCONTINUED | OUTPATIENT
Start: 2021-11-21 | End: 2021-11-23

## 2021-11-21 RX ORDER — CHOLECALCIFEROL (VITAMIN D3) 125 MCG
1000 CAPSULE ORAL DAILY
Refills: 0 | Status: DISCONTINUED | OUTPATIENT
Start: 2021-11-21 | End: 2021-11-23

## 2021-11-21 RX ORDER — LEVETIRACETAM 250 MG/1
1500 TABLET, FILM COATED ORAL AT BEDTIME
Refills: 0 | Status: DISCONTINUED | OUTPATIENT
Start: 2021-11-21 | End: 2021-11-23

## 2021-11-21 RX ORDER — DIAZEPAM 5 MG
2 TABLET ORAL ONCE
Refills: 0 | Status: DISCONTINUED | OUTPATIENT
Start: 2021-11-21 | End: 2021-11-21

## 2021-11-21 RX ADMIN — LEVETIRACETAM 1500 MILLIGRAM(S): 250 TABLET, FILM COATED ORAL at 22:51

## 2021-11-21 RX ADMIN — Medication 2 MILLIGRAM(S): at 22:51

## 2021-11-21 RX ADMIN — MIDAZOLAM HYDROCHLORIDE 4 MILLIGRAM(S): 1 INJECTION, SOLUTION INTRAMUSCULAR; INTRAVENOUS at 21:01

## 2021-11-21 RX ADMIN — Medication 50 GRAM(S): at 21:15

## 2021-11-21 RX ADMIN — MIDAZOLAM HYDROCHLORIDE 2 MILLIGRAM(S): 1 INJECTION, SOLUTION INTRAMUSCULAR; INTRAVENOUS at 23:02

## 2021-11-21 NOTE — ED ADULT NURSE NOTE - NSIMPLEMENTINTERV_GEN_ALL_ED
Implemented All Fall with Harm Risk Interventions:  [place] to call system. Call bell, personal items and telephone within reach. Instruct patient to call for assistance. Room bathroom lighting operational. Non-slip footwear when patient is off stretcher. Physically safe environment: no spills, clutter or unnecessary equipment. Stretcher in lowest position, wheels locked, appropriate side rails in place. Provide visual cue, wrist band, yellow gown, etc. Monitor gait and stability. Monitor for mental status changes and reorient to person, place, and time. Review medications for side effects contributing to fall risk. Reinforce activity limits and safety measures with patient and family. Provide visual clues: red socks.

## 2021-11-21 NOTE — H&P ADULT - HISTORY OF PRESENT ILLNESS
35 yo M h/o epilepsy residing at [address] [place] [address] for a witnessed seizure episode x4 min. Pt is post-ictal on exam, but arousable, following commands, and moving all extremities. Oriented to self but cannot recall previous events. Documents with patient show that he is taking Levetiracetam 500 mg 2 tabs po qAM and 3 tabs po qhs, as well as Depakote dr 500 mg po bid, without any missed doses. Attempts to call facility unsuccessful.

## 2021-11-21 NOTE — ED PROVIDER NOTE - ATTENDING CONTRIBUTION TO CARE
37 yo male with PMH of Schizoaffective d/o, Seizure d/o, Hep C, presents to the ER with EMS for "seizures x 4 min" PTA. Unclear who witnessed this, what they witnessed as no paperwork showing details of incident and multiple attempts to contact staff at  were unsuccessful. Pt has hx of this x several based on charts. List of meds in EMR include depakote, clonazepam, keppra, risperal inj, vit D3. Last neuro consult this May 2021 with Francie. Unclear who pt follows with and if compliant with meds as pt is post ictal in the ER on arrival. No fever, pt is arousable and able to move all extremities, is able to handle his own secretions. Exam as per resident note. Will check labs, ekg, and neuro consult. To reassess.       ALL: gabriellada 35 yo male with PMH of Schizoaffective d/o (is [place] inpatient at [facility], [address]), Seizure d/o, Hep C, presents to the ER with EMS for "seizures x 4 min" PTA. Unclear who witnessed this, what they witnessed as no paperwork showing details of incident and multiple attempts to contact staff at [facility] were unsuccessful. Pt has hx of this x several based on charts. List of meds in EMR include depakote, clonazepam, keppra, risperal inj, vit D3. Last neuro consult this May 2021 with Francie. Unclear who pt follows with and if compliant with meds as pt is post ictal in the ER on arrival. No fever, pt is arousable and able to move all extremities, is able to handle his own secretions. Exam as per resident note. Will check labs, ekg, and neuro consult. To reassess.       ALL: nkda 35 yo male with PMH of Schizoaffective d/o (lives at [address] [place] [address] [address]), Seizure d/o, Hep C, presents to the ER with EMS for "seizures x 4 min" PTA. Unclear who witnessed this, what they witnessed as no paperwork showing details of incident and multiple attempts to contact staff at  were unsuccessful. Pt has hx of this x several based on charts. List of meds in EMR include depakote, clonazepam, keppra, risperal inj, vit D3. Last neuro consult this May 2021 with Francie. Unclear who pt follows with and if compliant with meds as pt is post ictal in the ER on arrival. No fever, pt is arousable and able to move all extremities, is able to handle his own secretions. Exam as per resident note. Will check labs, ekg, and neuro consult. To reassess.       ALL: gabriellada

## 2021-11-21 NOTE — H&P ADULT - NSHPPHYSICALEXAM_GEN_ALL_CORE
Physical Exam:  General: WN/WD NAD  Neurology: Arousable and oriented x 1 s/p Versed for agitation, does not follows commands  Eyes: PERRLA/ EOMI  ENT/Neck: Neck supple, trachea midline, No JVD  Respiratory: CTA B/L, No wheezing, rales, rhonchi  CV: Normal rate regular rhythm, S1S2, no murmurs, rubs or gallops  Abdominal: Soft, NT, ND +BS,   Extremities: No edema, + peripheral pulses  Skin: No Rashes, Hematoma, Ecchymosis  Incisions: n/a  Tubes: n/a

## 2021-11-21 NOTE — ED ADULT TRIAGE NOTE - CHIEF COMPLAINT QUOTE
Pt BIBEMS from [address]; pt had a witnessed seizure that lasted about 4 mins. Pt is post-ictal, has extensive psych hx and seizure hx.

## 2021-11-21 NOTE — H&P ADULT - NSHPREVIEWOFSYSTEMS_GEN_ALL_CORE
REVIEW OF SYSTEMS: see cc/HPI   CONSTITUTIONAL: No weakness, fevers or chills  EYES/ENT: No visual changes;  No vertigo or throat pain   NECK: No pain or stiffness  RESPIRATORY: No cough, wheezing, hemoptysis; No shortness of breath  CARDIOVASCULAR: No chest pain or palpitations  GASTROINTESTINAL: No abdominal or epigastric pain. No nausea, vomiting, or hematemesis; No diarrhea or constipation. No melena or hematochezia.  GENITOURINARY: No dysuria, frequency or hematuria  NEUROLOGICAL: No numbness or weakness  SKIN: No itching, rashes

## 2021-11-21 NOTE — ED ADULT NURSE NOTE - OBJECTIVE STATEMENT
pt had a witnessed seizure that lasted about 4 mins. Pt is post-ictal, has extensive psych hx and seizure hx.

## 2021-11-21 NOTE — H&P ADULT - ASSESSMENT
A/p  Seizure disorder    A/p  Seizure disorder   -admit to [facility] for VEEG   -restart current dose of Rx  -Ativan 2mg if seizures > 2 mins   -check UDS / B12/ RPR / Folate     Schizophrenia - ?? compliance w/ Rx  -will need to verify dose and last administration of Rx in AM     DVT prophylaxis - ambulate PRN

## 2021-11-21 NOTE — ED PROVIDER NOTE - CLINICAL SUMMARY MEDICAL DECISION MAKING FREE TEXT BOX
35 yo male with PMH of Schizoaffective d/o, Seizure d/o, Hep C, presents to the ER with EMS for "seizures x 4 min" PTA. Unclear who witnessed this, what they witnessed as no paperwork showing details of incident and multiple attempts to contact staff at  were unsuccessful. Pt has hx of this x several based on charts. List of meds in EMR include depakote, clonazepam, keppra, risperal inj, vit D3. Last neuro consult this May 2021 with Francie. Unclear who pt follows with and if compliant with meds as pt is post ictal in the ER on arrival. No fever, pt is arousable and able to move all extremities, is able to handle his own secretions. Exam as per resident note. Consulted Neurology, [facility]. No bolus of meds. Pt becomes acutely agitated in the ER and was therefore given benzo's in the ER. To be admitted [facility] for eeg (covid neg). Awaiting transport, therefore H&P done by [place] hospitalist Dr Lipscomb. 35 yo male with PMH of Schizoaffective d/o, Seizure d/o, Hep C, presents to the ER with EMS for "seizures x 4 min" PTA. Unclear who witnessed this, what they witnessed as no paperwork showing details of incident and multiple attempts to contact staff at  were unsuccessful. Pt has hx of this x several based on charts. List of meds in EMR include depakote, clonazepam, keppra, risperal inj, vit D3. Last neuro consult this May 2021 with Francie. Unclear who pt follows with and if compliant with meds as pt is post ictal in the ER on arrival. No fever, pt is arousable and able to move all extremities, is able to handle his own secretions. Exam as per resident note. Consulted Neurology, recommending [facility] EEG admission. No bolus of meds. Pt becomes acutely agitated in the ER and was therefore given benzo's in the ER. [place] be admitted [facility] for eeg (covid neg). Awaiting transport, therefore H&P done by [place] hospitalist Dr Lipscomb.      Addendum: pt to signout AMA as he is refusing to go to [facility] for eeg admission.

## 2021-11-21 NOTE — ED PROVIDER NOTE - OBJECTIVE STATEMENT
35 yo M h/o epilepsy residing at [address] [place] [address] for a witnessed seizure episode x4 min. Pt is post-ictal on exam, but arousable, following commands, and moving all extremities. Oriented to self but cannot recall previous events. Documents with patient show that he is taking Levetiracetam 500 mg 2 tabs po qAM and 3 tabs po qhs, as well as Depakote dr 500 mg po bid, without any missed doses. Attempts to call facility unsuccessful.

## 2021-11-21 NOTE — ED ADULT NURSE NOTE - CHIEF COMPLAINT QUOTE
Pt BIBEMS from [address]; pt had a witnessed seizure that lasted about 4 mins. Pt is post-ictal, has extensive psych hx and seizure hx.

## 2021-11-21 NOTE — ED PROVIDER NOTE - PATIENT PORTAL LINK FT
You can access the FollowMyHealth Patient Portal offered by [facility] by registering at the following website: [email or web address]. By joining [employer]’s FollowMyHealth portal, you will also be able to view your health information using other applications (apps) compatible with our system.

## 2021-11-21 NOTE — H&P ADULT - NSHPLABSRESULTS_GEN_ALL_CORE
11-21    140  |  102  |  19  ----------------------------<  87  4.4   |  18  |  1.3    Ca    9.7      21 Nov 2021 19:40  Mg     1.8     11-21    TPro  7.0  /  Alb  4.5  /  TBili  <0.2  /  DBili  x   /  AST  15  /  ALT  10  /  AlkPhos  21<L>  11-21                        15.2   6.35  )-----------( 190      ( 21 Nov 2021 19:40 )             43.6

## 2021-11-21 NOTE — ED PROVIDER NOTE - PROGRESS NOTE DETAILS
Resident [place]: Attempted to contact pt's residential facility to find out last neuro evaluation Resident [place]: Attempted to contact pt's residential facility to get further history including last neuro evaluation. Attempts to get full history or get in contact with anyone who knows the patient were unsuccessful (tried all numbers from chart, number for facility, and additional numbers received by calling facility: [phone], [phone], [phone], [phone]).  Will review labs, consult neuro, and reassess pt when more awake. Yani: RN called stated pt is more awake now, but is very agitated. Trying to walk out without waiting for results of labs/neuro consult. Pt becoming beligerant with RN. Therefore ordered Valium IV. To observe and reassess. Resident [place]: Spoke with neurology, will get keppra and depakote levels, replete Mg. Neuro clears to dc pt if he returns to baseline.   Pt currently asleep s/p benzo IV Yani: RN did not give Valium. Given Versed instead. Valium was held as pt got dose of bezo already. (RN auto discontinued) dar: pt more agitated now. awake and screaming that he is going to leave. Pt unable to be verbally deescalated and is physically agitated. Versed has clearly worn off. Ordered IV Valium 2 mg. [facility] to [facility]/IM. Yani: Dr Lipscomb has gotten to speak to someone at facility that has access to this patients infor. Pt is not in an inpatient psych floor, he's in section of patients that can come and go as they please, can refuse meds as they chose and therefore can refuse admission for neuro workup, which the patient is doing now. Pt more calm, and states he does not [facility] [place], and that he wants to go home. He states he feels fine and based on nursing reports, and conversation hospitalist had with facility, they state he has capability to refuse care. Pt therefore to sign out AMA Resident [place]: Pt eloped without receiving AMA paperwork, but IV was taken out.

## 2021-11-21 NOTE — ED PROVIDER NOTE - PHYSICAL EXAMINATION
Vital signs reviewed; ABCs intact  GENERAL: Drowsy, appears stated age  SKIN: Euthermic, non-diaphoretic, well-perfused  HEAD & NECK: NCAT; supple neck, no JVD  EYES: EOMI, PERRL B/L, no scleral icterus, no conjunctival injection, no conjunctival pallor  CARD: RRR, S1, S2; no murmurs, no rubs, no gallops  RESP: Normal respiratory effort, CTA B/L, no rales, no wheezing  ABD: Soft, NT, ND, no rebound, no guarding  EXT: No edema; pulses palpable distally; no calf tenderness; symmetrical calf circumferences  NEUROMSK: Grossly intact  PSYCH: Oriented to self, following commands, moving all extremities; post-ictal

## 2021-11-22 LAB
COVID-19 SPIKE DOMAIN AB INTERP: POSITIVE
COVID-19 SPIKE DOMAIN ANTIBODY RESULT: 98.8 U/ML — HIGH
SARS-COV-2 IGG+IGM SERPL QL IA: 98.8 U/ML — HIGH
SARS-COV-2 IGG+IGM SERPL QL IA: POSITIVE

## 2021-11-23 LAB
COVID-19 NUCLEOCAPSID GAM AB INTERP: NEGATIVE — SIGNIFICANT CHANGE UP
COVID-19 NUCLEOCAPSID TOTAL GAM ANTIBODY RESULT: 0.08 INDEX — SIGNIFICANT CHANGE UP
SARS-COV-2 IGG+IGM SERPL QL IA: 0.08 INDEX — SIGNIFICANT CHANGE UP
SARS-COV-2 IGG+IGM SERPL QL IA: NEGATIVE — SIGNIFICANT CHANGE UP

## 2021-11-28 LAB — LEVETIRACETAM SERPL-MCNC: 13.2 UG/ML — SIGNIFICANT CHANGE UP (ref 10–40)

## 2021-12-02 DIAGNOSIS — G40.909 EPILEPSY, UNSPECIFIED, NOT INTRACTABLE, WITHOUT STATUS EPILEPTICUS: ICD-10-CM

## 2021-12-02 DIAGNOSIS — F20.9 SCHIZOPHRENIA, UNSPECIFIED: ICD-10-CM

## 2021-12-02 DIAGNOSIS — Z20.822 CONTACT WITH AND (SUSPECTED) EXPOSURE TO COVID-19: ICD-10-CM

## 2022-01-25 ENCOUNTER — OUTPATIENT (OUTPATIENT)
Dept: OUTPATIENT SERVICES | Facility: HOSPITAL | Age: 37
LOS: 1 days | Discharge: HOME | End: 2022-01-25

## 2022-01-25 ENCOUNTER — APPOINTMENT (OUTPATIENT)
Dept: NEUROLOGY | Facility: CLINIC | Age: 37
End: 2022-01-25
Payer: MEDICAID

## 2022-01-25 PROCEDURE — 99214 OFFICE O/P EST MOD 30 MIN: CPT

## 2022-01-27 DIAGNOSIS — Z02.9 ENCOUNTER FOR ADMINISTRATIVE EXAMINATIONS, UNSPECIFIED: ICD-10-CM

## 2022-04-04 NOTE — ED PROVIDER NOTE - EMPLOYMENT
Caller: Bernice Dunlap    Relationship: Self    Best call back number:[phone]  Requested Prescriptions:   Requested Prescriptions     Pending Prescriptions Disp Refills   • clopidogrel (PLAVIX) 75 MG tablet 30 tablet 5     Sig: Take 1 tablet by mouth Daily.        Pharmacy where request should be sent: [id number] [place] [id number] AT [facility] [phone] [facility] [phone] FX     Additional details provided by patient: PATIENT IS OUT OF MEDICATION.    Does the patient have less than a 3 day supply:  [x] Yes  [] No    Machelle Cardenas Rep   04/04/22 09:17 EDT            N/A

## 2022-04-27 ENCOUNTER — EMERGENCY (EMERGENCY)
Facility: HOSPITAL | Age: 37
LOS: 0 days | Discharge: HOME | End: 2022-04-28
Attending: EMERGENCY MEDICINE | Admitting: EMERGENCY MEDICINE
Payer: MEDICAID

## 2022-04-27 VITALS
SYSTOLIC BLOOD PRESSURE: 125 MMHG | RESPIRATION RATE: 16 BRPM | DIASTOLIC BLOOD PRESSURE: 72 MMHG | HEIGHT: 66 IN | TEMPERATURE: 97 F | HEART RATE: 100 BPM | OXYGEN SATURATION: 99 %

## 2022-04-27 DIAGNOSIS — B18.2 CHRONIC VIRAL HEPATITIS C: ICD-10-CM

## 2022-04-27 DIAGNOSIS — R56.9 UNSPECIFIED CONVULSIONS: ICD-10-CM

## 2022-04-27 DIAGNOSIS — Z86.69 PERSONAL HISTORY OF OTHER DISEASES OF THE NERVOUS SYSTEM AND SENSE ORGANS: ICD-10-CM

## 2022-04-27 PROCEDURE — 99284 EMERGENCY DEPT VISIT MOD MDM: CPT

## 2022-04-27 RX ORDER — DIVALPROEX SODIUM 500 MG/1
500 TABLET, DELAYED RELEASE ORAL ONCE
Refills: 0 | Status: COMPLETED | OUTPATIENT
Start: 2022-04-27 | End: 2022-04-27

## 2022-04-27 RX ORDER — LEVETIRACETAM 250 MG/1
1000 TABLET, FILM COATED ORAL ONCE
Refills: 0 | Status: COMPLETED | OUTPATIENT
Start: 2022-04-27 | End: 2022-04-27

## 2022-04-27 RX ADMIN — LEVETIRACETAM 1000 MILLIGRAM(S): 250 TABLET, FILM COATED ORAL at 20:11

## 2022-04-27 RX ADMIN — DIVALPROEX SODIUM 500 MILLIGRAM(S): 500 TABLET, DELAYED RELEASE ORAL at 20:10

## 2022-04-27 NOTE — ED PROVIDER NOTE - PATIENT PORTAL LINK FT
You can access the FollowMyHealth Patient Portal offered by [facility] by registering at the following website: [email or web address]. By joining [employer]’s FollowMyHealth portal, you will also be able to view your health information using other applications (apps) compatible with our system.

## 2022-04-27 NOTE — ED PROVIDER NOTE - NS ED ROS FT
Review of Systems    Constitutional: (-) fever   Eyes/ENT: (-) vision changes  Cardiovascular: (-) chest pain, (-) syncope (-) palpitations  Respiratory: (-) cough, (-) shortness of breath  Gastrointestinal: (-) vomiting, (-) diarrhea (-)black/bloody stools (-) abdominal pain  Genitourinary:  (-) dysuria   Musculoskeletal: (-) neck pain, (-) back pain, (-) leg pain/swelling  Integumentary see hpi   Neurological: (-) headache,  (-) confusion  Hematologic: (-) easy bruising

## 2022-04-27 NOTE — ED PROVIDER NOTE - ATTENDING APP SHARED VISIT CONTRIBUTION OF CARE
37yM p/w seizure c/w his usual hx of epilepsy w/ breakthrough seizures.  No apparently missed meds.  No head trauma.

## 2022-04-27 NOTE — ED PROVIDER NOTE - OBJECTIVE STATEMENT
37-year-old male with PMH epilepsy, presents from [facility] for a witnessed seizure x hrs pta.  He relates that he has breakthrough seizures every 2 to 3 months, despite taking his regular medications--he does not miss any doses.  Today he had a witnessed seizure, and sustained an abrasion to his left third digit, but no pain, decreased range of motion, other trauma, head injury, blood thinner use, other symptoms.  He is back to baseline.  Wants to go back to [facility].   Review of EMR reveals tetanus up-to-date August 2021  no fevers/cough/urinary sxs/ab pain/cp/sob.

## 2022-04-27 NOTE — ED PROVIDER NOTE - NSFOLLOWUPCLINICS_GEN_ALL_ED_FT
[facility]  Neurology  [address] [place] [address], [facility] [address]  [place] [address]  Phone: [phone]  Fax:   Follow Up Time: 1-3 Days

## 2022-04-27 NOTE — ED PROVIDER NOTE - PROGRESS NOTE DETAILS
ISRAEL BONNER: wound clean and dressed with sterile dressing. Reviewed necessity for follow up. Counseled on red flags and to return for them.  Patient appears well on discharge.

## 2022-04-27 NOTE — ED ADULT NURSE NOTE - OBJECTIVE STATEMENT
Patient is a 37y male biba due to witness seizure in [facility]. Patient sustained abrasion to his left third digit, denies other trauma. Patient's last seizure was 2 months ago. Patient stated he wants to be discharged and go back to [facility].

## 2022-04-27 NOTE — ED PROVIDER NOTE - CLINICAL SUMMARY MEDICAL DECISION MAKING FREE TEXT BOX
37yM p/w breakthrough seizure c/w usual.  Pt now back to baseline w/o focal neuro deficit or traumatic injury.  Ok to dc w/ plan for continued home meds, o/p neuro f/u, return precautions.

## 2022-04-27 NOTE — ED PROVIDER NOTE - NS ED ATTENDING STATEMENT MOD
This was a shared visit with the GINETTE. I reviewed and verified the documentation and independently performed the documented:

## 2022-04-28 NOTE — ED ADULT NURSE REASSESSMENT NOTE - NS ED NURSE REASSESS COMMENT FT1
Patient was aggregated, yelling at staff stating he wants to leave without transport. Educated patient on the importance of waiting for transport to send him to his residence. Patient refused and insist on leaving now. ANM made aware of the situation. Patient was escorted back to his residence by [facility].

## 2022-05-14 NOTE — ED PROVIDER NOTE - MDM ORDERS SUBMITTED SELECTION
[de-identified] : as per mom 3 weeks ago was in [place] and had a cold, has not recovered fully since. Voice has been gone for 2 weeks now. This morning was coughing up blood. Mom states a COVID PCR was already done [FreeTextEntry6] : 3 weeks ago had cough, congestion and fever x several days after travelling to [place] and [place]. His sxs improved this week but he still felt like he had phlegm in his throat so he kept trying to cough it out.  Yesterday when he did there was so,e blood with the phlegm.  He also had some laryngitis last week which has been improving after taking prednisone Rxd by walk in clinic.  Medications

## 2022-06-14 ENCOUNTER — APPOINTMENT (OUTPATIENT)
Dept: NEUROLOGY | Facility: CLINIC | Age: 37
End: 2022-06-14

## 2022-06-21 ENCOUNTER — OUTPATIENT (OUTPATIENT)
Dept: OUTPATIENT SERVICES | Facility: HOSPITAL | Age: 37
LOS: 1 days | Discharge: HOME | End: 2022-06-21

## 2022-06-21 ENCOUNTER — NON-APPOINTMENT (OUTPATIENT)
Age: 37
End: 2022-06-21

## 2022-06-21 ENCOUNTER — APPOINTMENT (OUTPATIENT)
Dept: NEUROLOGY | Facility: CLINIC | Age: 37
End: 2022-06-21

## 2022-06-21 VITALS
WEIGHT: 180 LBS | BODY MASS INDEX: 26.97 KG/M2 | SYSTOLIC BLOOD PRESSURE: 116 MMHG | DIASTOLIC BLOOD PRESSURE: 85 MMHG | HEIGHT: 68.5 IN | HEART RATE: 67 BPM | TEMPERATURE: 97 F | OXYGEN SATURATION: 98 %

## 2022-06-21 DIAGNOSIS — G40.909 EPILEPSY, UNSPECIFIED, NOT INTRACTABLE, W/OUT STATUS EPILEPTICUS: ICD-10-CM

## 2022-06-21 PROCEDURE — 99213 OFFICE O/P EST LOW 20 MIN: CPT

## 2022-06-21 RX ORDER — LEVETIRACETAM 750 MG/1
750 TABLET, FILM COATED ORAL TWICE DAILY
Qty: 120 | Refills: 5 | Status: ACTIVE | COMMUNITY
Start: 2019-10-15 | End: 1900-01-01

## 2022-06-21 RX ORDER — DIVALPROEX SODIUM 500 MG/1
500 TABLET, DELAYED RELEASE ORAL
Qty: 90 | Refills: 0 | Status: ACTIVE | COMMUNITY
Start: 2022-01-25

## 2022-06-21 NOTE — HISTORY OF PRESENT ILLNESS
[FreeTextEntry1] : Since his last visit, Mr. Whaley is doing well with current medications.  Remains on /1000 as per psychiatrist.  Tolerating medications without any reported side effects.  Denies any ataxia or lethargy.  No behavioral issues. Denies any weakness or numbness.  Denies any N/V.  No seizure activity reported by group home.  No b/w done since last visit.

## 2022-06-21 NOTE — PHYSICAL EXAM
[FreeTextEntry1] : NAD.  AOX3.  lang intact. \par CN 2- 12 intact. no nystagmus\par MS 5/5 b/l UE/LE\par sensory grossly intact\par gait normal\par no tremors, dyskinesias

## 2022-06-21 NOTE — ASSESSMENT
[FreeTextEntry1] : 38 yo w/ h/o schizophrenia, epilepsy currently on keppra with VPA for both mood and seizures currently stable. \par \par Recommendations:\par - continue keppra 1500 BID\par - continue VPA as per psychiatry\par - check b/w including keppra/VPA levels\par - RTC in 6 months

## 2022-06-28 DIAGNOSIS — G40.909 EPILEPSY, UNSPECIFIED, NOT INTRACTABLE, WITHOUT STATUS EPILEPTICUS: ICD-10-CM

## 2022-06-29 LAB
ALBUMIN SERPL ELPH-MCNC: 4.5 G/DL
ALP BLD-CCNC: 25 U/L
ALT SERPL-CCNC: 15 U/L
AMMONIA PLAS-MCNC: 77 UMOL/L
ANION GAP SERPL CALC-SCNC: 10 MMOL/L
AST SERPL-CCNC: 14 U/L
BASOPHILS # BLD AUTO: 0.03 K/UL
BASOPHILS NFR BLD AUTO: 0.4 %
BILIRUB SERPL-MCNC: 0.2 MG/DL
BUN SERPL-MCNC: 16 MG/DL
CALCIUM SERPL-MCNC: 9.3 MG/DL
CHLORIDE SERPL-SCNC: 105 MMOL/L
CO2 SERPL-SCNC: 26 MMOL/L
CREAT SERPL-MCNC: 1.3 MG/DL
EGFR: 72 ML/MIN/1.73M2
EOSINOPHIL # BLD AUTO: 0.07 K/UL
EOSINOPHIL NFR BLD AUTO: 0.9 %
GLUCOSE SERPL-MCNC: 88 MG/DL
HCT VFR BLD CALC: 47 %
HGB BLD-MCNC: 16 G/DL
IMM GRANULOCYTES NFR BLD AUTO: 0.4 %
LYMPHOCYTES # BLD AUTO: 3.07 K/UL
LYMPHOCYTES NFR BLD AUTO: 40.6 %
MAN DIFF?: NORMAL
MCHC RBC-ENTMCNC: 32.3 PG
MCHC RBC-ENTMCNC: 34 G/DL
MCV RBC AUTO: 94.9 FL
MONOCYTES # BLD AUTO: 0.74 K/UL
MONOCYTES NFR BLD AUTO: 9.8 %
NEUTROPHILS # BLD AUTO: 3.63 K/UL
NEUTROPHILS NFR BLD AUTO: 47.9 %
PLATELET # BLD AUTO: 187 K/UL
POTASSIUM SERPL-SCNC: 4 MMOL/L
PROT SERPL-MCNC: 7 G/DL
RBC # BLD: 4.95 M/UL
RBC # FLD: 11.8 %
SODIUM SERPL-SCNC: 141 MMOL/L
VALPROATE SERPL-MCNC: 45 UG/ML
WBC # FLD AUTO: 7.57 K/UL

## 2022-07-08 LAB — LEVETIRACETAM SERPL-MCNC: 33.5 UG/ML

## 2022-07-10 ENCOUNTER — EMERGENCY (EMERGENCY)
Facility: HOSPITAL | Age: 37
LOS: 0 days | Discharge: LEFT BEFORE DISCHARGE | End: 2022-07-10
Attending: EMERGENCY MEDICINE | Admitting: EMERGENCY MEDICINE

## 2022-07-10 VITALS
OXYGEN SATURATION: 98 % | TEMPERATURE: 98 F | HEIGHT: 66 IN | RESPIRATION RATE: 18 BRPM | HEART RATE: 95 BPM | DIASTOLIC BLOOD PRESSURE: 72 MMHG | SYSTOLIC BLOOD PRESSURE: 121 MMHG

## 2022-07-10 DIAGNOSIS — J98.11 ATELECTASIS: ICD-10-CM

## 2022-07-10 DIAGNOSIS — M50.321 OTHER CERVICAL DISC DEGENERATION AT C4-C5 LEVEL: ICD-10-CM

## 2022-07-10 DIAGNOSIS — Z53.29 PROCEDURE AND TREATMENT NOT CARRIED OUT BECAUSE OF PATIENT'S DECISION FOR OTHER REASONS: ICD-10-CM

## 2022-07-10 DIAGNOSIS — R56.9 UNSPECIFIED CONVULSIONS: ICD-10-CM

## 2022-07-10 DIAGNOSIS — F20.9 SCHIZOPHRENIA, UNSPECIFIED: ICD-10-CM

## 2022-07-10 DIAGNOSIS — Z20.822 CONTACT WITH AND (SUSPECTED) EXPOSURE TO COVID-19: ICD-10-CM

## 2022-07-10 DIAGNOSIS — Z86.19 PERSONAL HISTORY OF OTHER INFECTIOUS AND PARASITIC DISEASES: ICD-10-CM

## 2022-07-10 LAB
ALBUMIN SERPL ELPH-MCNC: 4.8 G/DL — SIGNIFICANT CHANGE UP (ref 3.5–5.2)
ALP SERPL-CCNC: 22 U/L — LOW (ref 30–115)
ALT FLD-CCNC: 14 U/L — SIGNIFICANT CHANGE UP (ref 0–41)
ANION GAP SERPL CALC-SCNC: 23 MMOL/L — HIGH (ref 7–14)
APAP SERPL-MCNC: <5 UG/ML — LOW (ref 10–30)
APTT BLD: 26.8 SEC — LOW (ref 27–39.2)
AST SERPL-CCNC: 18 U/L — SIGNIFICANT CHANGE UP (ref 0–41)
BASOPHILS # BLD AUTO: 0.02 K/UL — SIGNIFICANT CHANGE UP (ref 0–0.2)
BASOPHILS NFR BLD AUTO: 0.3 % — SIGNIFICANT CHANGE UP (ref 0–1)
BILIRUB SERPL-MCNC: 0.3 MG/DL — SIGNIFICANT CHANGE UP (ref 0.2–1.2)
BUN SERPL-MCNC: 12 MG/DL — SIGNIFICANT CHANGE UP (ref 10–20)
CALCIUM SERPL-MCNC: 9.8 MG/DL — SIGNIFICANT CHANGE UP (ref 8.5–10.1)
CHLORIDE SERPL-SCNC: 100 MMOL/L — SIGNIFICANT CHANGE UP (ref 98–110)
CO2 SERPL-SCNC: 17 MMOL/L — SIGNIFICANT CHANGE UP (ref 17–32)
CREAT SERPL-MCNC: 1.4 MG/DL — SIGNIFICANT CHANGE UP (ref 0.7–1.5)
EGFR: 66 ML/MIN/1.73M2 — SIGNIFICANT CHANGE UP
EOSINOPHIL # BLD AUTO: 0.06 K/UL — SIGNIFICANT CHANGE UP (ref 0–0.7)
EOSINOPHIL NFR BLD AUTO: 1 % — SIGNIFICANT CHANGE UP (ref 0–8)
ETHANOL SERPL-MCNC: <10 MG/DL — SIGNIFICANT CHANGE UP
GLUCOSE SERPL-MCNC: 76 MG/DL — SIGNIFICANT CHANGE UP (ref 70–99)
HCT VFR BLD CALC: 49.6 % — SIGNIFICANT CHANGE UP (ref 42–52)
HGB BLD-MCNC: 17 G/DL — SIGNIFICANT CHANGE UP (ref 14–18)
IMM GRANULOCYTES NFR BLD AUTO: 0.5 % — HIGH (ref 0.1–0.3)
INR BLD: 1.05 RATIO — SIGNIFICANT CHANGE UP (ref 0.65–1.3)
LACTATE SERPL-SCNC: 11.3 MMOL/L — CRITICAL HIGH (ref 0.7–2)
LIDOCAIN IGE QN: 24 U/L — SIGNIFICANT CHANGE UP (ref 7–60)
LYMPHOCYTES # BLD AUTO: 2.15 K/UL — SIGNIFICANT CHANGE UP (ref 1.2–3.4)
LYMPHOCYTES # BLD AUTO: 34.3 % — SIGNIFICANT CHANGE UP (ref 20.5–51.1)
MCHC RBC-ENTMCNC: 31.5 PG — HIGH (ref 27–31)
MCHC RBC-ENTMCNC: 34.3 G/DL — SIGNIFICANT CHANGE UP (ref 32–37)
MCV RBC AUTO: 91.9 FL — SIGNIFICANT CHANGE UP (ref 80–94)
MONOCYTES # BLD AUTO: 0.48 K/UL — SIGNIFICANT CHANGE UP (ref 0.1–0.6)
MONOCYTES NFR BLD AUTO: 7.7 % — SIGNIFICANT CHANGE UP (ref 1.7–9.3)
NEUTROPHILS # BLD AUTO: 3.53 K/UL — SIGNIFICANT CHANGE UP (ref 1.4–6.5)
NEUTROPHILS NFR BLD AUTO: 56.2 % — SIGNIFICANT CHANGE UP (ref 42.2–75.2)
NRBC # BLD: 0 /100 WBCS — SIGNIFICANT CHANGE UP (ref 0–0)
PLATELET # BLD AUTO: 158 K/UL — SIGNIFICANT CHANGE UP (ref 130–400)
POTASSIUM SERPL-MCNC: 4.1 MMOL/L — SIGNIFICANT CHANGE UP (ref 3.5–5)
POTASSIUM SERPL-SCNC: 4.1 MMOL/L — SIGNIFICANT CHANGE UP (ref 3.5–5)
PROT SERPL-MCNC: 7.4 G/DL — SIGNIFICANT CHANGE UP (ref 6–8)
PROTHROM AB SERPL-ACNC: 12.1 SEC — SIGNIFICANT CHANGE UP (ref 9.95–12.87)
RBC # BLD: 5.4 M/UL — SIGNIFICANT CHANGE UP (ref 4.7–6.1)
RBC # FLD: 11.8 % — SIGNIFICANT CHANGE UP (ref 11.5–14.5)
SALICYLATES SERPL-MCNC: <0.3 MG/DL — LOW (ref 4–30)
SARS-COV-2 RNA SPEC QL NAA+PROBE: SIGNIFICANT CHANGE UP
SODIUM SERPL-SCNC: 140 MMOL/L — SIGNIFICANT CHANGE UP (ref 135–146)
WBC # BLD: 6.27 K/UL — SIGNIFICANT CHANGE UP (ref 4.8–10.8)
WBC # FLD AUTO: 6.27 K/UL — SIGNIFICANT CHANGE UP (ref 4.8–10.8)

## 2022-07-10 PROCEDURE — 71045 X-RAY EXAM CHEST 1 VIEW: CPT | Mod: 26

## 2022-07-10 PROCEDURE — 70450 CT HEAD/BRAIN W/O DYE: CPT | Mod: 26,MA

## 2022-07-10 PROCEDURE — 71260 CT THORAX DX C+: CPT | Mod: 26,MA

## 2022-07-10 PROCEDURE — 99285 EMERGENCY DEPT VISIT HI MDM: CPT

## 2022-07-10 PROCEDURE — 93010 ELECTROCARDIOGRAM REPORT: CPT

## 2022-07-10 PROCEDURE — 72125 CT NECK SPINE W/O DYE: CPT | Mod: 26,MA

## 2022-07-10 PROCEDURE — 74177 CT ABD & PELVIS W/CONTRAST: CPT | Mod: 26,MA

## 2022-07-10 RX ORDER — MIDAZOLAM HYDROCHLORIDE 1 MG/ML
5 INJECTION, SOLUTION INTRAMUSCULAR; INTRAVENOUS ONCE
Refills: 0 | Status: DISCONTINUED | OUTPATIENT
Start: 2022-07-10 | End: 2022-07-10

## 2022-07-10 RX ADMIN — MIDAZOLAM HYDROCHLORIDE 5 MILLIGRAM(S): 1 INJECTION, SOLUTION INTRAMUSCULAR; INTRAVENOUS at 19:03

## 2022-07-10 RX ADMIN — MIDAZOLAM HYDROCHLORIDE 5 MILLIGRAM(S): 1 INJECTION, SOLUTION INTRAMUSCULAR; INTRAVENOUS at 18:31

## 2022-07-10 NOTE — ED PROVIDER NOTE - PROGRESS NOTE DETAILS
Paco: Received signout from Dr. Garcia.  Patient presented found down by EMS, possible seizure activity.  Patient has returned from CT scan.  Required 10 mg Versed first chemical sedation due to agitation.  Patient currently awake and responsive, sleeping calmly in stretcher.  Patient refused pelvic x-ray.  Vital signs stable.  Pending labs, CT reads, reassessment. Paco: pt found missing from his slotted room, eloped from ED

## 2022-07-10 NOTE — ED PROVIDER NOTE - PHYSICAL EXAMINATION
CONST: Patient is yeling and refusing to answer questions.  HEAD: Abrasion to the left forehead  EYES: PERRLA, EOMI, no conjunctival erythema  ENT: TMs WNL. No nasal discharge; airway clear. Oropharynx WNL.  CARDIAC:  Regular rate and rhythm, normal S1 and S2, no murmurs, rubs or gallops  RESP:  LCTAB; no rhonchi, stridor, wheezes, or rales; respiratory rate and effort appear normal for age  ABDOMEN:  Soft, nontender, nondistended.  EXT: Normal ROM. No LE TTP or edema bilaterally.  MUSCULOSKELETAL/NEURO:  Normal movement, normal tone

## 2022-07-10 NOTE — ED PROVIDER NOTE - OBJECTIVE STATEMENT
Patient is a 37 year old male brought by EMS for possible seizure activity. Patient was seen in the distances falling and having seizure like activity. It was unwitnessed if he began to seize prior to falling. Patient was given 160mg of Fentanyl by EMS and was post-ictal upon entrance into the ED. Patient refusing to answer questions at this time and denies any seizures.

## 2022-07-10 NOTE — ED PROVIDER NOTE - CLINICAL SUMMARY MEDICAL DECISION MAKING FREE TEXT BOX
Patient was seen collapsing and having seizure activity PTA. Patient with reported seizure history. Otherwise on arrival patient afebrile, HD stable, but uncooperative, refusing to answer questions, appears confused and post-ictal. Aggressive with staff requiring IM versed for restraint. EKG obtained once sedated and non-ischemic. Obtained labs which were grossly unremarkable including no significant leukocytosis, anemia, signs of dehydration/MARTIR, transaminitis or significant electrolyte abnormalities. Pan scan negative for trauma or intracranial pathologies to explain seizure. Patient eventually AAOx3 during course of ED monitoring and ultimately refused to stay for re-evaluation. Per RN report patient took out his IV and eloped from ED prior to reassessment. Patient was seen collapsing and having seizure activity PTA. Patient with reported seizure history. Otherwise on arrival patient afebrile, HD stable, but uncooperative, refusing to answer questions, appears confused and post-ictal. Aggressive with staff requiring IM versed for restraint. EKG obtained once sedated and non-ischemic. Obtained labs which were grossly unremarkable including no significant leukocytosis, anemia, signs of dehydration/MARTIR, transaminitis or significant electrolyte abnormalities. Pan scan negative for trauma or intracranial pathologies to explain seizure. Patient eventually AAOx3 during course of ED monitoring, requesting to leave and ultimately refused to stay for re-evaluation. Per RN report patient took out his IV and eloped from ED prior to reassessment.

## 2022-07-10 NOTE — ED ADULT NURSE NOTE - OBJECTIVE STATEMENT
Patient from [address] [place] [address] presents to ed after having seizure. Patient has hx seizure and it is unknown if he is compliant. Patient refusing initial assessment and sedation is required to obtain labs.

## 2022-07-10 NOTE — ED ADULT NURSE NOTE - NSIMPLEMENTINTERV_GEN_ALL_ED
Implemented All Fall with Harm Risk Interventions:  [place] to call system. Call bell, personal items and telephone within reach. Instruct patient to call for assistance. Room bathroom lighting operational. Non-slip footwear when patient is off stretcher. Physically safe environment: no spills, clutter or unnecessary equipment. Stretcher in lowest position, wheels locked, appropriate side rails in place. Provide visual cue, wrist band, yellow gown, etc. Monitor gait and stability. Monitor for mental status changes and reorient to person, place, and time. Review medications for side effects contributing to fall risk. Reinforce activity limits and safety measures with patient and family. Provide visual clues: red socks.

## 2022-07-21 NOTE — ED ADULT NURSE NOTE - NS ED NOTE  TALK SOMEONE YN
Subjective:      Patient ID: Wang Warren is a 41 y.o. female.    Chief Complaint: Follow-up (Foot pain)      In stance sharp pain and bunion right big toe joint.  Gradual onset improving some .  Colchicine, Medrol Dosepak, lidocaine help symptoms but have not resolved the problem.  She relates she is improved in her symptoms without complete resolution.  She ambulates today in flip-flops but relates stiff-soled shoes are more comfortable.  She denies trauma and surgery the right foot.  X-rays negative acute injury right 1st MTP.  Hallux valgus is present with bunion.  Blood work last visit shows acceptable renal function no signs of infection with elevated inflammatory markers as expected.    Review of Systems   Constitutional: Negative for chills, diaphoresis, fever, malaise/fatigue and night sweats.   Cardiovascular: Negative for claudication, cyanosis, leg swelling and syncope.   Skin: Negative for color change, dry skin, nail changes, rash, suspicious lesions and unusual hair distribution.   Musculoskeletal: Positive for joint pain and joint swelling. Negative for falls, muscle cramps, muscle weakness and stiffness.   Gastrointestinal: Negative for constipation, diarrhea, nausea and vomiting.   Neurological: Negative for brief paralysis, disturbances in coordination, focal weakness, numbness, paresthesias, sensory change and tremors.           Objective:      Physical Exam  Constitutional:       General: She is not in acute distress.     Appearance: She is well-developed. She is not diaphoretic.   Cardiovascular:      Pulses:           Popliteal pulses are 2+ on the right side and 2+ on the left side.        Dorsalis pedis pulses are 2+ on the right side and 2+ on the left side.        Posterior tibial pulses are 2+ on the right side and 2+ on the left side.      Comments: Capillary refill 3 seconds all toes/distal feet, all toes/both feet warm to touch.      Negative lymphadenopathy bilateral  popliteal fossa and tarsal tunnel.      Negavie lower extremity edema bilateral.    Musculoskeletal:      Right ankle: No swelling, deformity, ecchymosis or lacerations. Normal range of motion. Normal pulse.      Right Achilles Tendon: Normal. No defects. Barragan's test negative.      Comments:  Minimal residual pain to palpation range of motion right 1st MTPJ with significant hallux valgus which is partially reducible tracks laterally without being track bound.  There is mild dull erythema in the area without lymphangitis or ascending cellulitis.  Otherwise, right foot is without deformity, loss of function, signs of acute trauma.   Lymphadenopathy:      Lower Body: No right inguinal adenopathy. No left inguinal adenopathy.      Comments: Negative lymphadenopathy bilateral popliteal fossa and tarsal tunnel.    Negative lymphangitic streaking bilateral feet/ankles/legs.   Skin:     General: Skin is warm and dry.      Capillary Refill: Capillary refill takes 2 to 3 seconds.      Coloration: Skin is not pale.      Findings: No abrasion, bruising, burn, ecchymosis, erythema, laceration, lesion or rash.      Nails: There is no clubbing.      Comments:   Erythema right foot resolved.    Skin is normal age and health appropriate color, turgor, texture, and temperature bilateral lower extremities without ulceration, hyperpigmentation, discoloration, masses nodules or cords palpated.  No ecchymosis, erythema, edema, or cardinal signs of infection bilateral lower extremities.     Neurological:      Mental Status: She is alert and oriented to person, place, and time.      Sensory: No sensory deficit.      Motor: No tremor, atrophy or abnormal muscle tone.      Gait: Gait normal.      Deep Tendon Reflexes:      Reflex Scores:       Patellar reflexes are 2+ on the right side and 2+ on the left side.       Achilles reflexes are 2+ on the right side and 2+ on the left side.     Comments: Negative tinel sign to percussion sural,  superficial peroneal, deep peroneal, saphenous, and posterior tibial nerves right and left ankles and feet.     Psychiatric:         Behavior: Behavior is cooperative.               Assessment:       No diagnosis found.      Plan:       There are no diagnoses linked to this encounter.  I counseled the patient on her conditions, their implications and medical management.     lidocaine gel.  This can safely be used with her current medications and additional source of relief particularly at night.    Hydrate well 4 L water daily.        Ambulate minimally in fx boot weaning to shoes as symptoms allow..    Keep appointment for Rheumatology consult.      Declines steroid injection right 1st MTP pending improvement over the next couple weeks.      function to tolerance and shoes of choice.  Follow here p.r.n. if the gout flares up again before adequately managed Rheumatology.    No follow-ups on file.             No

## 2022-07-25 ENCOUNTER — EMERGENCY (EMERGENCY)
Facility: HOSPITAL | Age: 37
LOS: 0 days | Discharge: HOME | End: 2022-07-25
Attending: EMERGENCY MEDICINE | Admitting: EMERGENCY MEDICINE

## 2022-07-25 VITALS
TEMPERATURE: 98 F | HEIGHT: 66 IN | WEIGHT: 175.05 LBS | OXYGEN SATURATION: 98 % | SYSTOLIC BLOOD PRESSURE: 133 MMHG | DIASTOLIC BLOOD PRESSURE: 85 MMHG | RESPIRATION RATE: 18 BRPM | HEART RATE: 106 BPM

## 2022-07-25 DIAGNOSIS — R56.9 UNSPECIFIED CONVULSIONS: ICD-10-CM

## 2022-07-25 DIAGNOSIS — Z86.69 PERSONAL HISTORY OF OTHER DISEASES OF THE NERVOUS SYSTEM AND SENSE ORGANS: ICD-10-CM

## 2022-07-25 PROCEDURE — 99284 EMERGENCY DEPT VISIT MOD MDM: CPT

## 2022-07-25 NOTE — ED PROVIDER NOTE - PHYSICAL EXAMINATION
PE:  young m, nad  skin warm, dry  ncat  neck supple  rrr nl s1s2 no mrg  ctab no wrr  abd soft ntnd no palpable masses no rgr  back non-tender no cvat  ext no cce dpi  neuro aaox3 grossly nf exam   psych- flat affect, avoids eye contact, denies SI, HI, AVH, calm/cooperative during interview

## 2022-07-25 NOTE — ED PROVIDER NOTE - PROVIDER TOKENS
FREE:[LAST:[Neurologist],PHONE:[(   )    -],FAX:[(   )    -],ADDRESS:[your private Neurologist],FOLLOWUP:[Urgent],ESTABLISHEDPATIENT:[T]]

## 2022-07-25 NOTE — ED PROVIDER NOTE - CARE PROVIDER_API CALL
Neurologist,   your private Neurologist  Phone: (   )    -  Fax: (   )    -  Established Patient  Follow Up Time: Urgent

## 2022-07-25 NOTE — ED ADULT TRIAGE NOTE - CHIEF COMPLAINT QUOTE
BIBA from [address] [place], [place] [address]dg #1 for witnessed seizure by staff   pt with h/o seizure disorder  pt post-ictal in triage

## 2022-07-25 NOTE — ED PROVIDER NOTE - OBJECTIVE STATEMENT
37M from [facility] pmh seizures on keppra 1500 mg BID & depakote 500 mg QD p/w seizure. Pt states had witnessed seizure ~5pm today @ [facility]. Denies falls or HT. Recent ED presentation for same 7/10, w/u incl [facility] which was nml, eloped prior to final recs/discharge. Pt rpts compliance with seizure meds and requesting to be discharged. AAOX3, steady gait. Has no complaints. S/w SANDIE Stephen @ [facility], states that pt is compliant w/seizure meds daily (med rec confirms same, p/o paperwork accompanying pt), but has recently been using K2 frequently causing incr seizures. Pt has private Neurologist who follows him.

## 2022-07-25 NOTE — ED PROVIDER NOTE - PATIENT PORTAL LINK FT
You can access the FollowMyHealth Patient Portal offered by [facility] by registering at the following website: [email or web address]. By joining [employer]’s FollowMyHealth portal, you will also be able to view your health information using other applications (apps) compatible with our system.

## 2022-07-25 NOTE — ED PROVIDER NOTE - NSFOLLOWUPCLINICS_GEN_ALL_ED_FT
[facility]  Neurology  [address] [place] [address], [address]  [place] [address]  Phone: [phone]  Fax:   Follow Up Time: Urgent

## 2022-07-25 NOTE — ED PROVIDER NOTE - CLINICAL SUMMARY MEDICAL DECISION MAKING FREE TEXT BOX
seizures - compliant w/meds, per SBP RN pt has been using K2 - pt has no complaints, offered to chk labs/med levels, pt declined, wants to f/u as outpt w/his private neurologist - return precautions discussed, rec close op Neuro f/u

## 2022-07-25 NOTE — ED ADULT NURSE NOTE - CHIEF COMPLAINT QUOTE
BIBA from [address] [place], [place] [address]dg #1 for witnessed seizure by staff   pt with h/o seizure disorder  pt post-ictal in triage

## 2022-07-25 NOTE — ED ADULT NURSE NOTE - NS ED NURSE ELOPE COMMENTS
MD Cristian mcclure to leave MD Attending Iqra mcclure to leave. Pt stated he could not find his phone. No patient phone was found or noted on his person during his hospital stay.

## 2022-07-25 NOTE — ED ADULT NURSE NOTE - OBJECTIVE STATEMENT
Pt brought in by EMS after a seizure. Pt here expresses desire to leave without treatment. Pt aggressive, shouting "I want to leave. I don't need to be here." Pt AO4, ambulatory with steady gait. RN explained risks of eloping without care. RN called his nurse at [facility] ([phone] who verified he is able to walk back. Okay with Attending Iqra to discharge.

## 2022-07-25 NOTE — ED ADULT NURSE NOTE - NSIMPLEMENTINTERV_GEN_ALL_ED
Implemented All Fall Risk Interventions:  [place] to call system. Call bell, personal items and telephone within reach. Instruct patient to call for assistance. Room bathroom lighting operational. Non-slip footwear when patient is off stretcher. Physically safe environment: no spills, clutter or unnecessary equipment. Stretcher in lowest position, wheels locked, appropriate side rails in place. Provide visual cue, wrist band, yellow gown, etc. Monitor gait and stability. Monitor for mental status changes and reorient to person, place, and time. Review medications for side effects contributing to fall risk. Reinforce activity limits and safety measures with patient and family.

## 2022-08-04 NOTE — PROGRESS NOTE BEHAVIORAL HEALTH - SUMMARY
patient
Pt is a 36 year old AAF male, domiciled at [facility], with PMH of Seizure disorder (active medications) and PPHx of Schizoaffective D/O bipolar type vs Schizophrenia, and Anxiety Disorder who was admitted s/p seizure and head trauma that occurred at his residence. Psychiatry consulted for agitation and mental health evaluation. Recommendations for agitation were made though patient was uncooperative with interview at the time.    On follow-up evaluation today, ativan appears to have been effective in managing patient's agitation, with no further agitation noted since last receiving ativan. Would recommend continuing use as PRN for agitation. Patient reportedly due to return to [facility] residence today pending neurological clearance.      Plan:   -no current indication for IPP  -For agitation can continue Ativan 2mg q6h PO prn; escalate to IM/IV as needed if acute danger to self or others  -Would avoid PRN antipsychotics if possible given they may lower seizure threshold and patient is already on long-acting injectable antipsychotics  -psychiatry will sign off, recall as needed

## 2022-09-28 NOTE — ED ADULT NURSE NOTE - INTERVENTIONS DEFINITIONS
Recommend throat lozenges and gargling warm salt water for throat irritation  Throat culture sent  Urine culture sent  Your results will appear in your MyChart  Recommend a humidifier  Follow-up with your OBGYN  Follow-up with your PCP in 3-5 days  Proceed to the ER if symptoms worsen 
Call bell, personal items and telephone within reach/Instruct patient to call for assistance/Non-slip footwear when patient is off stretcher/Physically safe environment: no spills, clutter or unnecessary equipment/Stretcher in lowest position, wheels locked, appropriate side rails in place/Provide visual cue, wrist band, yellow gown, etc./Monitor gait and stability/Monitor for mental status changes and reorient to person, place, and time/Review medications for side effects contributing to fall risk/Reinforce activity limits and safety measures with patient and family

## 2022-11-02 ENCOUNTER — OUTPATIENT (OUTPATIENT)
Dept: OUTPATIENT SERVICES | Facility: HOSPITAL | Age: 37
LOS: 1 days | Discharge: HOME | End: 2022-11-02

## 2022-11-02 DIAGNOSIS — R94.31 ABNORMAL ELECTROCARDIOGRAM [ECG] [EKG]: ICD-10-CM

## 2022-11-02 PROCEDURE — 93010 ELECTROCARDIOGRAM REPORT: CPT

## 2022-11-16 NOTE — PATIENT PROFILE ADULT - BILL PAYMENT
YOB: 1969  Location: [facility]  Location Address: [address] [place] [address], [facility], [address] [place] [address]  Location Phone: [phone]    Chief Complaint   Patient presents with   • Ear Problem       History of Present Illness  Yusra Fisher is a 53 y.o. female.  Yusra Fisher is here for follow up of ENT complaints. The patient has had problems with hearing loss and allergy symptoms.   She states her hearing has remained stable and she has not had any ear pain or popping. Patient states her allergies have been relatively stable on nasal sprays     She denies tinnitus and feels as if she does relatively well with communication without needing amplification      Procedure visit with Mayra Holland Au.D (2022)    Past Medical History:   Diagnosis Date   • Arthritis    • Family history of colon cancer    • Hypothyroidism    • Migraines    • Pernicious anemia    • PONV (postoperative nausea and vomiting)    • RA (rheumatoid arthritis) (HCC)        Past Surgical History:   Procedure Laterality Date   • ABDOMINOPLASTY     • CHOLECYSTECTOMY WITH INTRAOPERATIVE CHOLANGIOGRAM N/A 10/22/2021    Procedure: LAPAROSCOPIC CHOLECYSTECTOMY;  Surgeon: Le Yusuf MD;  Location: [facility] OR;  Service: General;  Laterality: N/A;   • COLONOSCOPY  2016    The entire examined colon is normal on direct and retroflexion views; No specimens collected; Repeat 10 years   • COLONOSCOPY  2012    Preparation of the colon was fair; The examined portion of the ileum was normal; The entire examined colon is normal on direct and retroflexion views; Repeat 4 years   • COLONOSCOPY  2007    Normal colonoscopy; Repeat 4 years   • COLONOSCOPY N/A 2021    Procedure: COLONOSCOPY WITH ANESTHESIA;  Surgeon: Aaliyah Holguin MD;  Location: [facility] ENDOSCOPY;  Service: Gastroenterology;  Laterality: N/A;  pre op: diarrhea  post op: normal  PCP: Rajan Boss MD   • DILATATION AND CURETTAGE     •  ENDOSCOPY N/A 6/18/2021    Procedure: ESOPHAGOGASTRODUODENOSCOPY WITH ANESTHESIA;  Surgeon: Aaliyah Holguin MD;  Location: [facility] ENDOSCOPY;  Service: Gastroenterology;  Laterality: N/A;  pre op: nausea  post op: normal  PCP: Rajan Boss MD   • ESSURE TUBAL LIGATION     • HYSTERECTOMY  10/13/2020       Outpatient Medications Marked as Taking for the 11/16/22 encounter (Office Visit) with Nicki Cash APRN   Medication Sig Dispense Refill   • Ascorbic Acid (Vitamin C) 500 MG capsule Take 1 capsule by mouth Daily.     • azelastine (ASTELIN) 0.1 % nasal spray 2 sprays into the nostril(s) as directed by provider 2 (Two) Times a Day. Use in each nostril as directed 30 mL 11   • Cyanocobalamin (Vitamin B-12) 1000 MCG sublingual tablet Place 2 tablets under the tongue Daily.     • DHEA 10 MG capsule Take 1 capsule by mouth Daily.     • fluticasone (FLONASE) 50 MCG/ACT nasal spray 2 sprays into the nostril(s) as directed by provider Daily. 16 g 11   • Hormone Cream Base cream Place 1 application on the skin as directed by provider Daily. 80% Estriol/20% Estradiol/0.5 testosterone Compounded at [facility]     • hydroxychloroquine (PLAQUENIL) 200 MG tablet Take 200 mg by mouth Every Night.     • IRON CR PO Take 85 mg by mouth 2 (two) times a day.     • Magnesium 500 MG tablet Take 1 tablet by mouth 2 (two) times a day.     • Myrbetriq 25 MG tablet sustained-release 24 hour 24 hr tablet      • pantoprazole (PROTONIX) 40 MG EC tablet Take 1 tablet by mouth Daily. 30 tablet 11   • Progesterone (PROMETRIUM) 100 MG capsule Take 1 capsule by mouth Daily.     • sertraline (ZOLOFT) 50 MG tablet      • Thyroid 60 MG PO tablet Take 90 mg by mouth Daily.     • Vitamin D-Vitamin K (Vitamin K2-Vitamin D3)  MCG-UNIT capsule Take 1 capsule by mouth Daily.     • Zinc 50 MG capsule Take 1 capsule by mouth Daily.     • [DISCONTINUED] azelastine (ASTELIN) 0.1 % nasal spray 2 sprays into the nostril(s) as directed by  provider 2 (Two) Times a Day. Use in each nostril as directed 30 mL 11   • [DISCONTINUED] fluticasone (FLONASE) 50 MCG/ACT nasal spray 2 sprays into the nostril(s) as directed by provider Daily. 16 g 11   • [DISCONTINUED] sertraline (ZOLOFT) 25 MG tablet Take 25 mg by mouth Daily.         Penicillins and Chlorhexidine gluconate    Family History   Problem Relation Age of Onset   • Diabetes Father    • Colon cancer Maternal Grandfather         In his 60's or 70's   • Liver cancer Neg Hx    • Liver disease Neg Hx    • Stomach cancer Neg Hx    • Colon polyps Neg Hx    • Rectal cancer Neg Hx        Social History     Socioeconomic History   • Marital status:    Tobacco Use   • Smoking status: Never   • Smokeless tobacco: Never   Vaping Use   • Vaping Use: Never used   Substance and Sexual Activity   • Alcohol use: Not Currently   • Drug use: Never   • Sexual activity: Defer     Partners: Male     Birth control/protection: Surgical     Comment: Essure       Review of Systems   Constitutional: Negative.    HENT: Positive for hearing loss. Negative for ear discharge, ear pain and tinnitus.    Allergic/Immunologic: Positive for environmental allergies.       Vitals:    11/16/22 1127   BP: 105/63   Pulse: 64   Temp: 98.4 °F (36.9 °C)       Body mass index is 27.12 kg/m².    Objective     Physical Exam  Vitals reviewed.   Constitutional:       Appearance: Normal appearance. She is normal weight.   HENT:      Head: Normocephalic.      Right Ear: Tympanic membrane, ear canal and external ear normal.      Left Ear: Tympanic membrane, ear canal and external ear normal.      Nose: Nose normal.      Mouth/Throat:      Lips: Pink.      Mouth: Mucous membranes are moist.      Pharynx: Uvula midline.   Musculoskeletal:      Cervical back: Full passive range of motion without pain.   Neurological:      Mental Status: She is alert.         Assessment & Plan   Diagnoses and all orders for this visit:    1. Sensorineural hearing  loss (SNHL) of left ear with unrestricted hearing of right ear (Primary)  -     Comprehensive Hearing Test; Future    2. Dysfunction of both eustachian tubes    3. Allergic rhinitis, unspecified seasonality, unspecified trigger    Other orders  -     azelastine (ASTELIN) 0.1 % nasal spray; 2 sprays into the nostril(s) as directed by provider 2 (Two) Times a Day. Use in each nostril as directed  Dispense: 30 mL; Refill: 11  -     fluticasone (FLONASE) 50 MCG/ACT nasal spray; 2 sprays into the nostril(s) as directed by provider Daily.  Dispense: 16 g; Refill: 11  -     mometasone (ELOCON) 0.1 % solution; Apply  topically to the appropriate area as directed Daily for 7 days. 3-4 drops in affected ear once a day  Dispense: 60 mL; Refill: 1      * Surgery not found *  Orders Placed This Encounter   Procedures   • Comprehensive Hearing Test     Standing Status:   Future     Standing Expiration Date:   11/16/2023     Order Specific Question:   Laterality     Answer:   Bilateral     Order Specific Question:   Release to patient     Answer:   Immediate     Return in about 1 year (around 11/16/2023) for Recheck.     Hearing protection discussed   Call or return for problems   Continue nasal sprays    Patient Instructions   HEARING LOSS       Hearing loss is the third most common health condition in the [place] affecting more than 1 of every 10 people.  This means that over 28 million Americans suffer from hearing loss.  The condition varies with age: 1 out of every 25 children, 1 out of every 14 aged 14-44 years old, 1 out of every 7 adults aged 45-65 year old, and 1 out of every 3 adults over the age of 65 years old.    The ear works by receiving sound vibrations, amplifying the sound, and then converting the mechanical vibration into an electrical signal that is sent to the hearing center of the brain.  The ear has three basic parts, each with very specific functions:  The External Ear   This is made up of the parts  of the ear you can see (the auricle), and the ear canal.  These structures function to funnel the sound vibrations down into the ear so that they can be processed.  The Middle Ear  The external ear and the middle ear are  by the eardrum (tympanic membrane).  The middle ear is an air-filled space that houses the middle ear bones (ossicles).  Sound waves hit the eardrum and cause it to vibrate.  The vibrations are then transferred to the ear bones that amplify the sound and transfer it to the inner ear (cochlea).  The Inner Ear  The inner ear is a snail-shaped bone that contains a fluid-filled membrane inside another fluid-filled membrane.  It acts like a battery to transform the mechanical vibrations into and electrical signal.  It does this with very delicate hair cells that rest on top of the inner ear membranes.  The electrical signal is then shipped out of the inner ear to the brain where it is processed and understood as sound.    Disease in any one of these parts of the ear can cause hearing loss, but can do so in two different ways.  When there is a problem with the process of bringing the sound to the inner ear (i.e. problems in the external or middle ear) it is called a conductive hearing loss.  Problems in the inner ear or nerves of hearing care called sensorineural hearing losses.  Often times, however, there is a combination of the types of hearing losses or a mixed hearing loss.  Conductive Hearing Loss  Impairment of the transfer of sound into the inner ear because of problems with the external ear, the eardrum or the middle ear can cause conductive hearing loss.  Conductive losses can often be lessened or cured with medication or surgery, depending on the etiology.  Causes of Conductive Hearing Losses:  • Wax occlusion of the ear canal  • Eardrum perforations  • Stiffened or scarred eardrums  • Fluid in the middle earspace (otitis media with effusion)  • Middle ear infections (acute otitis  media)  • Scarring or fixation of the ear bones (tympanosclerosis, otosclerosis)  • Dislocation of the ear bones  • Cholesteatoma: this is a “skin cyst” that develops due to severely retracted eardrums or from skin growth into the middle ear from a chronic eardrum perforation.  Over time the cyst can grow and erode the bones of hearing.  • Middle ear tumors or masses  Sensorineural Hearing Loss  Deficits in hearing due to problems in the inner ear or nerves of hearing are termed sensorineural losses.  These are usually due to damage to the microscopic hair cells in the cochlea, or due to loss of the nerve cells in the hearing nerve.  For the most part, this type of hearing loss cannot be repaired with medication or surgery, except in rare instances.  Often times, however, it can be helped with hearing aids and rarely with cochlear implantation.  This type of loss can also be associated with tinnitus (ringing of the ears) and vertigo (dizziness).  Causes of Sensorineural Hearing Losses:  • Damage from noise exposure  • Aging:  this is often a slow, progressive loss of the high frequencies  • Infection (labrynthitis)  • Secondary loss from the effects of meningitis  • Genetic/familial related hearing loss  • Autoimmune hearing loss (a rheumatoid-like process)  • Temporal bond fracture (severe fracture of the bone around or through the inner ear)  • Medication induced damage (chemotherapy, high dose IV antibiotics, diuretics)  • Inner ear and skull base tumors (acoustic neuroma, memingioma)        Specific Causes    Ear Infections and Effusion in Children  Any time the middle ear is filled with fluid, as in an active infection or in middle ear effusion, there can be a conductive hearing.  Ear infections are the most common cause of infant hearing loss and are a very treatable type of hearing loss.  Usually the middle ear is filled with air, which allows the bones of hearing to freely move.  When there is fluid in this  space it slows the vibration and is literally like trying to hear underwater.  Most of the time this infection or fluid can be treated with medication.  If this does not happen, often myringotomy tube placement can allow for drainage of fluid and allow the middle ear space to remain ventilated.  Congenital Hearing Loss  Hearing loss is the most common birth defect, affecting 3 in every 1000 children born in the [place].  If untreated, this can lead to significant problems in speech, language, and learning.  Recently, most hospitals have early infant screening that can identify newborns with hearing problems.  With accurate diagnosis, these children can often be treated with hearing aids and other forms of treatment that can allow them to develop normal speech and learning.  Noise Induced Hearing Loss  According to the National Institutes of Health, approximately 10 million Americans have hearing losses that are a least partially attributable to loud noise exposure.  Exposures that can cause permanent damage vary, but short exposures of very loud quality (gunfire) can be as bad as longer exposures at lower levels.  Most conversations are at about 65-70 decibels.  Levels over 85dB, with exposures of up to 8 hours a day, will produce permanent hearing loss after many years of exposure.  A stereo headset turned up full blast (about 110 dB) can cause a significant hearing loss in less than a half an hour.  These losses are due to damage of the hair cells of the inner ear from the excessive vibrational acoustic trauma of the loud noise.  Since this loss is usually nonreversible, hearing protection is essential.  Foam earplugs can provide 15-30dB of noise protection.  Age Related Hearing Loss (Presbyacusis)  Age related hearing loss is another very common form of hearing loss in the elderly.  It is usually a slowly progressive, high frequency sensorineural loss that in nonreversible.  Not all elderly people will have  hearing loss as this is very much related to the genetic makeup of the individual.  This can cause the patient to withdraw from social situations, or cause them to seem to have a mood change because of the frustration of not being able to hear a conversation or having to ask people to repeat things.  Most of the time, this can be well treated by amplifying sounds with a hearing aid.    Hearing loss can have a large impact in the way we function on a day to day basis with our environment.  We are fortunate that most hearing loss can be treated by hearing aids or medical and surgical treatments.  If you think you may be suffering from hearing loss, an evaluation by our doctor can help identify the cause and the specific treatment that can help improve your hearing.      no

## 2022-11-17 ENCOUNTER — EMERGENCY (EMERGENCY)
Facility: HOSPITAL | Age: 37
LOS: 0 days | Discharge: HOME | End: 2022-11-17
Attending: EMERGENCY MEDICINE | Admitting: EMERGENCY MEDICINE

## 2022-11-17 VITALS
HEART RATE: 62 BPM | RESPIRATION RATE: 18 BRPM | OXYGEN SATURATION: 99 % | DIASTOLIC BLOOD PRESSURE: 80 MMHG | SYSTOLIC BLOOD PRESSURE: 131 MMHG | TEMPERATURE: 97 F | WEIGHT: 169.98 LBS

## 2022-11-17 DIAGNOSIS — R25.1 TREMOR, UNSPECIFIED: ICD-10-CM

## 2022-11-17 DIAGNOSIS — G40.909 EPILEPSY, UNSPECIFIED, NOT INTRACTABLE, WITHOUT STATUS EPILEPTICUS: ICD-10-CM

## 2022-11-17 DIAGNOSIS — F20.9 SCHIZOPHRENIA, UNSPECIFIED: ICD-10-CM

## 2022-11-17 LAB
ALBUMIN SERPL ELPH-MCNC: 5.2 G/DL — SIGNIFICANT CHANGE UP (ref 3.5–5.2)
ALP SERPL-CCNC: 30 U/L — SIGNIFICANT CHANGE UP (ref 30–115)
ALT FLD-CCNC: 11 U/L — SIGNIFICANT CHANGE UP (ref 0–41)
ANION GAP SERPL CALC-SCNC: 11 MMOL/L — SIGNIFICANT CHANGE UP (ref 7–14)
AST SERPL-CCNC: 17 U/L — SIGNIFICANT CHANGE UP (ref 0–41)
BASOPHILS # BLD AUTO: 0.01 K/UL — SIGNIFICANT CHANGE UP (ref 0–0.2)
BASOPHILS NFR BLD AUTO: 0.1 % — SIGNIFICANT CHANGE UP (ref 0–1)
BILIRUB SERPL-MCNC: 0.7 MG/DL — SIGNIFICANT CHANGE UP (ref 0.2–1.2)
BUN SERPL-MCNC: 9 MG/DL — LOW (ref 10–20)
CALCIUM SERPL-MCNC: 10.6 MG/DL — HIGH (ref 8.4–10.4)
CHLORIDE SERPL-SCNC: 101 MMOL/L — SIGNIFICANT CHANGE UP (ref 98–110)
CO2 SERPL-SCNC: 27 MMOL/L — SIGNIFICANT CHANGE UP (ref 17–32)
CREAT SERPL-MCNC: 1 MG/DL — SIGNIFICANT CHANGE UP (ref 0.7–1.5)
EGFR: 99 ML/MIN/1.73M2 — SIGNIFICANT CHANGE UP
EOSINOPHIL # BLD AUTO: 0.06 K/UL — SIGNIFICANT CHANGE UP (ref 0–0.7)
EOSINOPHIL NFR BLD AUTO: 0.7 % — SIGNIFICANT CHANGE UP (ref 0–8)
GLUCOSE SERPL-MCNC: 78 MG/DL — SIGNIFICANT CHANGE UP (ref 70–99)
HCT VFR BLD CALC: 48.2 % — SIGNIFICANT CHANGE UP (ref 42–52)
HGB BLD-MCNC: 16.3 G/DL — SIGNIFICANT CHANGE UP (ref 14–18)
IMM GRANULOCYTES NFR BLD AUTO: 0.2 % — SIGNIFICANT CHANGE UP (ref 0.1–0.3)
LITHIUM SERPL-MCNC: 0.74 MMOL/L — SIGNIFICANT CHANGE UP (ref 0.6–1.2)
LYMPHOCYTES # BLD AUTO: 2.55 K/UL — SIGNIFICANT CHANGE UP (ref 1.2–3.4)
LYMPHOCYTES # BLD AUTO: 28.1 % — SIGNIFICANT CHANGE UP (ref 20.5–51.1)
MCHC RBC-ENTMCNC: 31.8 PG — HIGH (ref 27–31)
MCHC RBC-ENTMCNC: 33.8 G/DL — SIGNIFICANT CHANGE UP (ref 32–37)
MCV RBC AUTO: 94 FL — SIGNIFICANT CHANGE UP (ref 80–94)
MONOCYTES # BLD AUTO: 0.54 K/UL — SIGNIFICANT CHANGE UP (ref 0.1–0.6)
MONOCYTES NFR BLD AUTO: 6 % — SIGNIFICANT CHANGE UP (ref 1.7–9.3)
NEUTROPHILS # BLD AUTO: 5.89 K/UL — SIGNIFICANT CHANGE UP (ref 1.4–6.5)
NEUTROPHILS NFR BLD AUTO: 64.9 % — SIGNIFICANT CHANGE UP (ref 42.2–75.2)
NRBC # BLD: 0 /100 WBCS — SIGNIFICANT CHANGE UP (ref 0–0)
PLATELET # BLD AUTO: 284 K/UL — SIGNIFICANT CHANGE UP (ref 130–400)
POTASSIUM SERPL-MCNC: 4.4 MMOL/L — SIGNIFICANT CHANGE UP (ref 3.5–5)
POTASSIUM SERPL-SCNC: 4.4 MMOL/L — SIGNIFICANT CHANGE UP (ref 3.5–5)
PROT SERPL-MCNC: 8.2 G/DL — HIGH (ref 6–8)
RBC # BLD: 5.13 M/UL — SIGNIFICANT CHANGE UP (ref 4.7–6.1)
RBC # FLD: 11.7 % — SIGNIFICANT CHANGE UP (ref 11.5–14.5)
SODIUM SERPL-SCNC: 139 MMOL/L — SIGNIFICANT CHANGE UP (ref 135–146)
VALPROATE SERPL-MCNC: <3 UG/ML — LOW (ref 50–100)
WBC # BLD: 9.07 K/UL — SIGNIFICANT CHANGE UP (ref 4.8–10.8)
WBC # FLD AUTO: 9.07 K/UL — SIGNIFICANT CHANGE UP (ref 4.8–10.8)

## 2022-11-17 PROCEDURE — 99284 EMERGENCY DEPT VISIT MOD MDM: CPT

## 2022-11-17 RX ORDER — DIPHENHYDRAMINE HCL 50 MG
50 CAPSULE ORAL ONCE
Refills: 0 | Status: COMPLETED | OUTPATIENT
Start: 2022-11-17 | End: 2022-11-17

## 2022-11-17 RX ORDER — DIPHENHYDRAMINE HCL 50 MG
50 CAPSULE ORAL ONCE
Refills: 0 | Status: DISCONTINUED | OUTPATIENT
Start: 2022-11-17 | End: 2022-11-17

## 2022-11-17 RX ADMIN — Medication 50 MILLIGRAM(S): at 19:00

## 2022-11-17 NOTE — ED PROVIDER NOTE - CLINICAL SUMMARY MEDICAL DECISION MAKING FREE TEXT BOX
37-year-old male history of schizophrenia presenting for evaluation of bilateral upper extremity tremors.  No other acute complaints.  No exacerbating or alleviating symptoms.  Well appearing, NAD, non toxic. NCAT PERRLA EOMI neck supple non tender normal wob cta bl rrr abdomen s nt nd no rebound no guarding WWPx4 mild bilateral upper extremity tremors otherwise neuro non focal.  Aware of all results, given a copy of all available results, comfortable with discharge and follow-up outpatient, strict return precautions given. Endorses understanding and aware they can return at any time for further evaluation. No further questions or concerns at this time.

## 2022-11-17 NOTE — ED PROVIDER NOTE - NS ED ROS FT
CONST: No fever, chills or bodyaches  EYES: No pain, redness, drainage or visual changes.  ENT: No ear pain or discharge, nasal discharge or congestion.  CARD: No chest pain, palpitations  RESP: No SOB, cough  GI: No abdominal pain, N/V/D  MS: No joint pain, back pain or extremity pain/injury  SKIN: No rashes  NEURO: No headache, dizziness, paresthesias or LOC. (+) tremor

## 2022-11-17 NOTE — ED PROVIDER NOTE - PROGRESS NOTE DETAILS
Facility transfer sheet states prescribing physician is Dr. Roderick Stockton. Called office no answer or answering machine.

## 2022-11-17 NOTE — ED ADULT NURSE NOTE - NSFALLRSKOUTCOME_ED_ALL_ED
Presents with redness/complicated wound to right foot pt noticed 1.5 weeks ago. Is febrile and tachycardic at triage. Reports being in the middle of a 14 day fast. Denies trauma.    Universal Safety Interventions

## 2022-11-17 NOTE — ED PROVIDER NOTE - NSFOLLOWUPINSTRUCTIONS_ED_ALL_ED_FT
PLEASE HAVE YOUR FACILITY CONTACT YOUR PSYCHIATRIST DR CHAVEZ TO RELAY THESE LAB VALUES TO HIM AND TO DISCUSS TREATMENT FOR TREMOR        Tremor      A tremor is trembling or shaking that you cannot control. Most tremors affect the hands or arms. Tremors can also affect the head, vocal cords, face, and other parts of the body. There are many types of tremors. Common types include:  •Essential tremor. These usually occur in people older than 40. It may run in families and can happen in otherwise healthy people.      •Resting tremor. These occur when the muscles are at rest, such as when your hands are resting in your lap. People with Parkinson's disease often have resting tremors.      •Postural tremor. These occur when you try to hold a pose, such as keeping your hands outstretched.      •Kinetic tremor. These occur during purposeful movement, such as trying to touch a finger to your nose.      •Task-specific tremor. These may occur when you perform certain tasks such as writing, speaking, or standing.      •Psychogenic tremor. These dramatically lessen or disappear when you are distracted. They can happen in people of all ages.      Some types of tremors have no known cause. Tremors can also be a symptom of nervous system problems (neurological disorders) that may occur with aging. Some tremors go away with treatment, while others do not.      Follow these instructions at home:      Lifestyle                   •Limit alcohol intake to no more than 1 drink a day for nonpregnant women and 2 drinks a day for men. One drink equals 12 oz of beer, 5 oz of wine, or 1½ oz of hard liquor.      • Do not use any products that contain nicotine or tobacco, such as cigarettes and e-cigarettes. If you need help quitting, ask your health care provider.      •Avoid extreme heat and extreme cold.      •Limit your caffeine intake, as told by your health care provider.      •Try to get 8 hours of sleep each night.      •Find ways to manage your stress, such as meditation or yoga.      General instructions     •Take over-the-counter and prescription medicines only as told by your health care provider.      •Keep all follow-up visits as told by your health care provider. This is important.        Contact a health care provider if you:    •Develop a tremor after starting a new medicine.    •Have a tremor along with other symptoms such as:  •Numbness.      •Tingling.      •Pain.      •Weakness.        •Notice that your tremor gets worse.      •Notice that your tremor interferes with your day-to-day life.        Summary    •A tremor is trembling or shaking that you cannot control.      •Most tremors affect the hands or arms.      •Some types of tremors have no known cause. Others may be a symptom of nervous system problems (neurological disorders).      •Make sure you discuss any tremors you have with your health care provider.      This information is not intended to replace advice given to you by your health care provider. Make sure you discuss any questions you have with your health care provider.

## 2022-11-17 NOTE — ED PROVIDER NOTE - OBJECTIVE STATEMENT
36yo male with PMhx epilepsy, schiozphrenia, resident of [address] [place] [address] presents sent from facility to check lithium level r/o toxicity as patient has been noting a tremor since starting Lithium 1.5mths ago. MAR from facility shows Lithium 300mg in the AM and Lithium 450mg PM as well as Haldol and Benztropine 0.5mg given daily. Pt reports tremor seems to be confined to hands, described as "constantly shaky." He denies substance abuse. Denies sense of confusion, gait abnormality, falls, or recent seizures.

## 2022-11-17 NOTE — ED ADULT NURSE NOTE - CHIEF COMPLAINT QUOTE
Patient was sent from [address] [place] [address] for high lithium levels on routine blood work. Patient denies any symptoms at this time

## 2022-11-17 NOTE — ED ADULT TRIAGE NOTE - CHIEF COMPLAINT QUOTE
Patient was sent from [address] [place] [address] for high lithium levels on routine blood work. Patient denies any symptoms at this time

## 2022-11-17 NOTE — ED PROVIDER NOTE - PHYSICAL EXAMINATION
CONST: Well appearing in NAD  EYES: PERRL, EOMI, Sclera and conjunctiva clear.   CARD: Normal S1 S2; Normal rate and rhythm  RESP: Equal BS B/L, No wheezes, rhonchi or rales. No distress  GI: Soft, non-tender, non-distended.  MS: Normal ROM in all extremities. No midline spinal tenderness.  SKIN: Warm, dry, no acute rashes. Good turgor  PSYCH: Flat affect, coherent speech. Appropriate mood  NEURO: A&Ox3, No focal deficits. Strength 5/5 with no sensory deficits. Slight resting tremor of bilateral hands.

## 2022-11-17 NOTE — ED ADULT NURSE NOTE - OBJECTIVE STATEMENT
Pt here at the direction of PMD due to the abnormal lithium level.  Pt denies SOB or chest pain. Pt denies N/V/D.  Pt has tremor bilaterally in upper extremities.  Pt states the tremors began when they began taking lithium.  Pt has no other complaints.  No distress noted at this time.

## 2022-11-17 NOTE — ED PROVIDER NOTE - PATIENT PORTAL LINK FT
You can access the FollowMyHealth Patient Portal offered by [facility] by registering at the following website: [email or web address]. By joining [facility]’s FollowMyHealth portal, you will also be able to view your health information using other applications (apps) compatible with our system.

## 2022-11-24 NOTE — ED ADULT NURSE REASSESSMENT NOTE - NSFALLRSKHMRSKTYOTFT_ED_ALL_ED
Prior CS in  due to arrest of descent  Desires RLTCS  Discussed risks of  section, including risk of injury to bowels, bladder, vasculature, and other surrounding structures  Also discussed risks of TOLAC, including <1% risk of uterine rupture  EFW 83%, AC>95%, reviewed risks of shoulder dystocia seizures

## 2022-12-10 ENCOUNTER — EMERGENCY (EMERGENCY)
Facility: HOSPITAL | Age: 37
LOS: 0 days | Discharge: HOME | End: 2022-12-10
Attending: EMERGENCY MEDICINE | Admitting: EMERGENCY MEDICINE

## 2022-12-10 VITALS
HEART RATE: 101 BPM | RESPIRATION RATE: 18 BRPM | SYSTOLIC BLOOD PRESSURE: 137 MMHG | DIASTOLIC BLOOD PRESSURE: 94 MMHG | TEMPERATURE: 98 F | OXYGEN SATURATION: 98 %
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
## 2022-12-10 DIAGNOSIS — S01.81XA LACERATION WITHOUT FOREIGN BODY OF OTHER PART OF HEAD, INITIAL ENCOUNTER: ICD-10-CM

## 2022-12-10 DIAGNOSIS — F20.9 SCHIZOPHRENIA, UNSPECIFIED: ICD-10-CM

## 2022-12-10 DIAGNOSIS — Z86.19 PERSONAL HISTORY OF OTHER INFECTIOUS AND PARASITIC DISEASES: ICD-10-CM

## 2022-12-10 DIAGNOSIS — G40.409 OTHER GENERALIZED EPILEPSY AND EPILEPTIC SYNDROMES, NOT INTRACTABLE, WITHOUT STATUS EPILEPTICUS: ICD-10-CM

## 2022-12-10 DIAGNOSIS — F17.200 NICOTINE DEPENDENCE, UNSPECIFIED, UNCOMPLICATED: ICD-10-CM
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
## 2022-12-10 DIAGNOSIS — R56.9 UNSPECIFIED CONVULSIONS: ICD-10-CM

## 2022-12-10 DIAGNOSIS — Z86.59 PERSONAL HISTORY OF OTHER MENTAL AND BEHAVIORAL DISORDERS: ICD-10-CM

## 2022-12-10 LAB
ALBUMIN SERPL ELPH-MCNC: 4.6 G/DL — SIGNIFICANT CHANGE UP (ref 3.5–5.2)
ALP SERPL-CCNC: 25 U/L — LOW (ref 30–115)
ALT FLD-CCNC: 13 U/L — SIGNIFICANT CHANGE UP (ref 0–41)
ANION GAP SERPL CALC-SCNC: 13 MMOL/L — SIGNIFICANT CHANGE UP (ref 7–14)
AST SERPL-CCNC: 20 U/L — SIGNIFICANT CHANGE UP (ref 0–41)
BASOPHILS # BLD AUTO: 0.02 K/UL — SIGNIFICANT CHANGE UP (ref 0–0.2)
BASOPHILS NFR BLD AUTO: 0.2 % — SIGNIFICANT CHANGE UP (ref 0–1)
BILIRUB SERPL-MCNC: 0.4 MG/DL — SIGNIFICANT CHANGE UP (ref 0.2–1.2)
BUN SERPL-MCNC: 11 MG/DL — SIGNIFICANT CHANGE UP (ref 10–20)
CALCIUM SERPL-MCNC: 10.1 MG/DL — SIGNIFICANT CHANGE UP (ref 8.4–10.5)
CHLORIDE SERPL-SCNC: 102 MMOL/L — SIGNIFICANT CHANGE UP (ref 98–110)
CO2 SERPL-SCNC: 24 MMOL/L — SIGNIFICANT CHANGE UP (ref 17–32)
CREAT SERPL-MCNC: 1.4 MG/DL — SIGNIFICANT CHANGE UP (ref 0.7–1.5)
EGFR: 66 ML/MIN/1.73M2 — SIGNIFICANT CHANGE UP
EOSINOPHIL # BLD AUTO: 0.03 K/UL — SIGNIFICANT CHANGE UP (ref 0–0.7)
EOSINOPHIL NFR BLD AUTO: 0.3 % — SIGNIFICANT CHANGE UP (ref 0–8)
ETHANOL SERPL-MCNC: <10 MG/DL — SIGNIFICANT CHANGE UP
GLUCOSE SERPL-MCNC: 88 MG/DL — SIGNIFICANT CHANGE UP (ref 70–99)
HCT VFR BLD CALC: 45.3 % — SIGNIFICANT CHANGE UP (ref 42–52)
HGB BLD-MCNC: 15.7 G/DL — SIGNIFICANT CHANGE UP (ref 14–18)
IMM GRANULOCYTES NFR BLD AUTO: 0.3 % — SIGNIFICANT CHANGE UP (ref 0.1–0.3)
LIDOCAIN IGE QN: 21 U/L — SIGNIFICANT CHANGE UP (ref 7–60)
LITHIUM SERPL-MCNC: 0.87 MMOL/L — SIGNIFICANT CHANGE UP (ref 0.6–1.2)
LYMPHOCYTES # BLD AUTO: 0.99 K/UL — LOW (ref 1.2–3.4)
LYMPHOCYTES # BLD AUTO: 9.6 % — LOW (ref 20.5–51.1)
MCHC RBC-ENTMCNC: 32.1 PG — HIGH (ref 27–31)
MCHC RBC-ENTMCNC: 34.7 G/DL — SIGNIFICANT CHANGE UP (ref 32–37)
MCV RBC AUTO: 92.6 FL — SIGNIFICANT CHANGE UP (ref 80–94)
MONOCYTES # BLD AUTO: 0.6 K/UL — SIGNIFICANT CHANGE UP (ref 0.1–0.6)
MONOCYTES NFR BLD AUTO: 5.8 % — SIGNIFICANT CHANGE UP (ref 1.7–9.3)
NEUTROPHILS # BLD AUTO: 8.69 K/UL — HIGH (ref 1.4–6.5)
NEUTROPHILS NFR BLD AUTO: 83.8 % — HIGH (ref 42.2–75.2)
NRBC # BLD: 0 /100 WBCS — SIGNIFICANT CHANGE UP (ref 0–0)
PLATELET # BLD AUTO: 224 K/UL — SIGNIFICANT CHANGE UP (ref 130–400)
POTASSIUM SERPL-MCNC: 4.8 MMOL/L — SIGNIFICANT CHANGE UP (ref 3.5–5)
POTASSIUM SERPL-SCNC: 4.8 MMOL/L — SIGNIFICANT CHANGE UP (ref 3.5–5)
PROT SERPL-MCNC: 7 G/DL — SIGNIFICANT CHANGE UP (ref 6–8)
RBC # BLD: 4.89 M/UL — SIGNIFICANT CHANGE UP (ref 4.7–6.1)
RBC # FLD: 11.7 % — SIGNIFICANT CHANGE UP (ref 11.5–14.5)
SODIUM SERPL-SCNC: 139 MMOL/L — SIGNIFICANT CHANGE UP (ref 135–146)
WBC # BLD: 10.36 K/UL — SIGNIFICANT CHANGE UP (ref 4.8–10.8)
WBC # FLD AUTO: 10.36 K/UL — SIGNIFICANT CHANGE UP (ref 4.8–10.8)

## 2022-12-10 PROCEDURE — 99285 EMERGENCY DEPT VISIT HI MDM: CPT

## 2022-12-10 PROCEDURE — 70450 CT HEAD/BRAIN W/O DYE: CPT | Mod: 26,MA

## 2022-12-10 PROCEDURE — 72125 CT NECK SPINE W/O DYE: CPT | Mod: 26,MA

## 2022-12-10 NOTE — CONSULT NOTE ADULT - ASSESSMENT
37M from [address] [place] [address] with a PMH of epilepsy (on Keppra) and schizophrenia (on Lithium), presenting for a breakthrough seizure. Pt is either unable or unwilling to provide additional details of the event. He appears agitated and is pacing around the ED repeatedly saying "get me out of here, I need to get out of here". There is large, bleeding, open wound in the middle of his forehead. Per ED discussion with supervisor at [address] [place] [address], patient had a witnessed seizure with tonic/clonic activity and head trauma, and he was postictal afterwards. Supervisor mentioned that the facility is not at closed unit, implying that patient may have ingested a substance (as he has a hx of marijuana and K2 use).    Impression  ·	Patient had a seizure episode in the setting of known hx of epilepsy. Neuro exam is limited due to agitation and desire to leave the hospital. However, he is walking around the ED with normal gait, there do not appear to be any focal deficits. He has severe trauma to the forehead which should demand the most immediate attention. CTH is negative for hemorrhage or infarct, but shows focal soft tissue laceration in the right forehead near midline without underlying fracture. Given degree of injury, there is concern for concussion.    Recommendations

## 2022-12-10 NOTE — ED ADULT NURSE NOTE - OBJECTIVE STATEMENT
Pt found on floor of facility that is apart of w/ lac to forehead noted, postical, unknown seizure time, denies bowel/urinary incontinence, drug/alcohol use. Irritable, loud when answering questions.

## 2022-12-10 NOTE — ED PROVIDER NOTE - PHYSICAL EXAMINATION
Constitutional: Well developed, well nourished. NAD  Head: + 4cm vertical laceration noted to mid forehead with bleeding;  Eyes: PERRL, EOMI.  ENT: No nasal discharge. Mucous membranes dry.  Neck: Supple. Painless ROM.  Cardiovascular: Regular rate and rhythm.    Pulmonary:   Lungs clear to auscultation bilaterally.   Abdominal: Soft. Nondistended. No rebound, guarding, rigidity.  Extremities. Pelvis stable. No lower extremity edema, symmetric calves.  Skin: No rashes, cyanosis.  Neuro: AAOx3. No focal neurological deficits.  Psych: Normal mood. Normal affect.

## 2022-12-10 NOTE — ED PROVIDER NOTE - CLINICAL SUMMARY MEDICAL DECISION MAKING FREE TEXT BOX
37-year-old male with a past medical history of seizure disorder, schizophrenia, sent from [facility]. after episode of seizure-like activity there.  Limited history as patient is not able to provide details.  Witnesses saw generalized tonic-clonic activity.  Did hit his head and has obvious laceration to mid forehead.  Patient has reportedly been compliant with his medications but still has frequent seizures.  Reportedly on a unit and is allowed to come and go as it pleases.  On exam awake and alert, vitals noted, approximately 4 cm subcutaneous laceration noted to the mid forearm with bleeding.  Does not appear to go to skull.  No pulsatile bleeding.  Limited exam due to patient cooperation but does have 5 out of 5 strength and grossly no focal neurological deficits, lungs clear bilaterally, heart regular no murmurs, abdomen benign.  No other signs of injury on exam.  Labs overall reassuring, CT head and cervical spine with no surgical emergency.  Complex laceration repair completed.  Seen by neurology and we both felt patient is stable for further outpatient management.

## 2022-12-10 NOTE — ED PROVIDER NOTE - OBJECTIVE STATEMENT
37 yold male to Ed Pmhx seizure disorder, schizophernai sent from [facility] for evaluation of seizure activity; pt with hx of seizures currently on keppra; pt unable to provide signif hx - case d/w suprevisor at [address] - witnesses siezure with tonic/clonic ativity - hit head; + post ictal period; pt with frequent seizures despite compliance with meds; [facility] is not at closed unit and pt's area allowed to go; + hx marijuana and K2;

## 2022-12-10 NOTE — ED PROVIDER NOTE - PATIENT PORTAL LINK FT
You can access the FollowMyHealth Patient Portal offered by [facility] by registering at the following website: [email or web address]. By joining [facility]’s FollowMyHealth portal, you will also be able to view your health information using other applications (apps) compatible with our system.

## 2022-12-10 NOTE — CONSULT NOTE ADULT - SUBJECTIVE AND OBJECTIVE BOX
Neurology Consult    Patient is a 37M from [address] [place] [address] with a PMH of epilepsy (on Keppra) and schizophrenia (on Lithium), presenting for a breakthrough seizure. Pt is unable to provide details of the event secondary to postictal state. Per ED discussion with supervisor at [address] [place] [address], patient had a witnessed seizure with tonic/clonic activity and head trauma. Supervisor mentioned that the facility is not at closed unit, implying that patient may have ingested a substance (as he has a hx of marijuana and K2 use).        PAST MEDICAL & SURGICAL HISTORY:  Seizures      Seizure      Schizophrenia      Hep C w/o coma, chronic      No significant past surgical history      No significant past surgical history          FAMILY HISTORY:  Family history of essential hypertension (Mother)        Social History: (-) x 3    Allergies    No Known Allergies    Intolerances        MEDICATIONS  (STANDING):    MEDICATIONS  (PRN):      Review of systems:    Constitutional: as per HPI  Eyes: No eye pain or discharge  ENMT:  No difficulty hearing; No sinus or throat pain  Neck: No pain or stiffness  Respiratory: No cough, wheezing, chills or hemoptysis  Cardiovascular: No chest pain, palpitations, shortness of breath, dyspnea on exertion  Gastrointestinal: No abdominal pain, nausea, vomiting or hematemesis; No diarrhea or constipation.   Genitourinary: No dysuria, frequency, hematuria or incontinence  Neurological: As per HPI  Skin: No rashes or lesions   Endocrine: No heat or cold intolerance; No hair loss  Musculoskeletal: No joint pain or swelling  Psychiatric: No depression, anxiety, mood swings  Heme/Lymph: No easy bruising or bleeding gums    Vital Signs Last 24 Hrs  T(C): 36.8 (10 Dec 2022 18:30), Max: 36.8 (10 Dec 2022 18:30)  T(F): 98.2 (10 Dec 2022 18:30), Max: 98.2 (10 Dec 2022 18:30)  HR: 101 (10 Dec 2022 18:30) (101 - 101)  BP: 137/94 (10 Dec 2022 18:30) (137/94 - 137/94)  BP(mean): --  RR: 18 (10 Dec 2022 18:30) (18 - 18)  SpO2: 98% (10 Dec 2022 18:30) (98% - 98%)    Parameters below as of 10 Dec 2022 18:30  Patient On (Oxygen Delivery Method): room air          Neurological Examination:  General:  Appearance is consistent with chronologic age.  No abnormal facies.  Gross skin survey within normal limits.    Cognitive/Language:  Awake, alert, and oriented to person, place, time and date.  Recent and remote memory intact.  Fund of knowledge is appropriate.  Naming, repetition and comprehension intact.   Nondysarthric.    Cranial Nerves  - Eyes: Visual acuity intact, Visual fields full.  EOMI w/o nystagmus, skew or reported double vision.  PERRL.  No ptosis/weakness of eyelid closure.    - Face:  Facial sensation normal V1 - 3, no facial asymmetry.    - Ears/Nose/Throat:  Hearing grossly intact b/l to finger rub.  Palate elevates midline.  Tongue and uvula midline.   Motor examination:  Upper Extremities: L 5/5, R 5/5; Lower extremities: L 5/5, R 5/5.  No observable drift. Normal tone and bulk. No tenderness, twitching, tremors or involuntary movements.  Sensory examination:   Intact to light touch and pinprick, pain, temperature and proprioception and vibration in all extremities.  Reflexes:   2+ b/l biceps, triceps, brachioradialis, patella and achilles.  Plantar response downgoing b/l.  Jaw jerk, Laurence, clonus absent.  Cerebellum:   FTN/HKS intact.  No dysmetria or dysdiadokinesia.  Gait narrow based and normal.      Labs:   CBC Full  -  ( 10 Dec 2022 20:30 )  WBC Count : 10.36 K/uL  RBC Count : 4.89 M/uL  Hemoglobin : 15.7 g/dL  Hematocrit : 45.3 %  Platelet Count - Automated : 224 K/uL  Mean Cell Volume : 92.6 fL  Mean Cell Hemoglobin : 32.1 pg  Mean Cell Hemoglobin Concentration : 34.7 g/dL  Auto Neutrophil # : 8.69 K/uL  Auto Lymphocyte # : 0.99 K/uL  Auto Monocyte # : 0.60 K/uL  Auto Eosinophil # : 0.03 K/uL  Auto Basophil # : 0.02 K/uL  Auto Neutrophil % : 83.8 %  Auto Lymphocyte % : 9.6 %  Auto Monocyte % : 5.8 %  Auto Eosinophil % : 0.3 %  Auto Basophil % : 0.2 %    12-10    139  |  102  |  11  ----------------------------<  88  4.8   |  24  |  1.4    Ca    10.1      10 Dec 2022 20:30    TPro  7.0  /  Alb  4.6  /  TBili  0.4  /  DBili  x   /  AST  20  /  ALT  13  /  AlkPhos  25<L>  12-10    LIVER FUNCTIONS - ( 10 Dec 2022 20:30 )  Alb: 4.6 g/dL / Pro: 7.0 g/dL / ALK PHOS: 25 U/L / ALT: 13 U/L / AST: 20 U/L / GGT: x                   Neuroimaging:  NCHCT: CT Head No Cont:   ACC: [id number] EXAM:  CT CERVICAL SPINE                        ACC: [id number] EXAM:  CT BRAIN                          PROCEDURE DATE:  12/10/2022          INTERPRETATION:  CLINICAL HISTORY / REASON FOR EXAM: Seizure activity,   head injury.    TECHNIQUE:  Noncontrast head and cervical spine CT was performed.    Multiple contiguous axial images were obtained from the skull base to the   vertex without the use of intravenous contrast. Reformatted coronal and   sagittal images were were subsequently obtained and reviewed.    Axial images were obtained through the cervical spine using multislice   helical technique.  Reformatted coronal and sagittal images were were   subsequently obtained and reviewed.    COMPARISON: CT head and cervical spine 7/10/2022.    FINDINGS:    CT HEAD:    Focal soft tissue laceration in the right forehead near midline without   underlying fracture.    The ventricles and cortical sulci are appropriate for the patient's   stated age.    There is no evidence for intracranial hemorrhage, large territorial   infarct, midline shift, or mass effect.    The calvarium is intact. Visualized paranasal sinuses and mastoids are   well-aerated. The visualized orbits are unremarkable.    CT CERVICAL SPINE:    There is no evidence of acute fracture, compression deformity or facet   subluxation of the cervical spine.    The atlantoaxial relationships are maintained.  The posterior elements   are intact. There is no significant prevertebral soft tissue swelling.      IMPRESSION:    CT HEAD:  Focal soft tissue laceration in the right forehead near midline without   underlying fracture.    No evidence for intracranial hemorrhage, large territorial infarct,   midline shift, or mass effect.    CT CERVICAL SPINE:  No evidence of a cervical spine fracture or subluxation.    --- End of Report ---          DEDRICK DASILVA MD; Resident Radiologist  This document has been electronically signed.  CLARA WALTERS MD; Attending Radiologist  This document has been electronicallysigned. Dec 10 2022  8:44PM (12-10-22 @ 19:53)      12-10-22 @ 22:05       Neurology Consult    Patient is a 37M from [address] [place] [address] with a PMH of epilepsy (on Keppra) and schizophrenia (on Lithium), presenting for a breakthrough seizure. Pt is either unable or unwilling to provide additional details of the event. He appears agitated and is pacing around the ED repeatedly saying "get me out of here, I need to get out of here". There is large, bleeding, open wound in the middle of his forehead. Per ED discussion with supervisor at [address] [place] [address], patient had a witnessed seizure with tonic/clonic activity and head trauma, and he was postictal afterwards. Supervisor mentioned that the facility is not at closed unit, implying that patient may have ingested a substance (as he has a hx of marijuana and K2 use).        PAST MEDICAL & SURGICAL HISTORY:  Seizures      Seizure      Schizophrenia      Hep C w/o coma, chronic      No significant past surgical history      No significant past surgical history          FAMILY HISTORY:  Family history of essential hypertension (Mother)        Social History: (-) x 3    Allergies    No Known Allergies    Intolerances        MEDICATIONS  (STANDING):    MEDICATIONS  (PRN):      Review of systems:    Constitutional: as per HPI  Eyes: No eye pain or discharge  ENMT:  No difficulty hearing; No sinus or throat pain  Neck: No pain or stiffness  Respiratory: No cough, wheezing, chills or hemoptysis  Cardiovascular: No chest pain, palpitations, shortness of breath, dyspnea on exertion  Gastrointestinal: No abdominal pain, nausea, vomiting or hematemesis; No diarrhea or constipation.   Genitourinary: No dysuria, frequency, hematuria or incontinence  Neurological: As per HPI  Skin: No rashes or lesions   Endocrine: No heat or cold intolerance; No hair loss  Musculoskeletal: No joint pain or swelling  Psychiatric: No depression, anxiety, mood swings  Heme/Lymph: No easy bruising or bleeding gums    Vital Signs Last 24 Hrs  T(C): 36.8 (10 Dec 2022 18:30), Max: 36.8 (10 Dec 2022 18:30)  T(F): 98.2 (10 Dec 2022 18:30), Max: 98.2 (10 Dec 2022 18:30)  HR: 101 (10 Dec 2022 18:30) (101 - 101)  BP: 137/94 (10 Dec 2022 18:30) (137/94 - 137/94)  BP(mean): --  RR: 18 (10 Dec 2022 18:30) (18 - 18)  SpO2: 98% (10 Dec 2022 18:30) (98% - 98%)    Parameters below as of 10 Dec 2022 18:30  Patient On (Oxygen Delivery Method): room air          Neurological Examination:  General:  Clearly traumatic injury to head.   Cognitive/Language:  Awake, alert, and oriented to person and place. Patient appears somewhat confused and agitated. Exam limited  Cranial Nerves  - Eyes: EOMI, PERRL  - Face:  Facial sensation normal V1 - 3, no facial asymmetry. 4-5cm open wound noted on midline forehead, actively bleeding  - Ears/Nose/Throat:  Hearing grossly intact   Motor examination:  Moves all extremities freely. Is standing up throughout the entirety of encounter, pacing around ED  Cerebellum:  Gait narrow based and normal.      Labs:   CBC Full  -  ( 10 Dec 2022 20:30 )  WBC Count : 10.36 K/uL  RBC Count : 4.89 M/uL  Hemoglobin : 15.7 g/dL  Hematocrit : 45.3 %  Platelet Count - Automated : 224 K/uL  Mean Cell Volume : 92.6 fL  Mean Cell Hemoglobin : 32.1 pg  Mean Cell Hemoglobin Concentration : 34.7 g/dL  Auto Neutrophil # : 8.69 K/uL  Auto Lymphocyte # : 0.99 K/uL  Auto Monocyte # : 0.60 K/uL  Auto Eosinophil # : 0.03 K/uL  Auto Basophil # : 0.02 K/uL  Auto Neutrophil % : 83.8 %  Auto Lymphocyte % : 9.6 %  Auto Monocyte % : 5.8 %  Auto Eosinophil % : 0.3 %  Auto Basophil % : 0.2 %    12-10    139  |  102  |  11  ----------------------------<  88  4.8   |  24  |  1.4    Ca    10.1      10 Dec 2022 20:30    TPro  7.0  /  Alb  4.6  /  TBili  0.4  /  DBili  x   /  AST  20  /  ALT  13  /  AlkPhos  25<L>  12-10    LIVER FUNCTIONS - ( 10 Dec 2022 20:30 )  Alb: 4.6 g/dL / Pro: 7.0 g/dL / ALK PHOS: 25 U/L / ALT: 13 U/L / AST: 20 U/L / GGT: x                   Neuroimaging:  NCHCT: CT Head No Cont:   ACC: [id number] EXAM:  CT CERVICAL SPINE                        ACC: [id number] EXAM:  CT BRAIN                          PROCEDURE DATE:  12/10/2022          INTERPRETATION:  CLINICAL HISTORY / REASON FOR EXAM: Seizure activity,   head injury.    TECHNIQUE:  Noncontrast head and cervical spine CT was performed.    Multiple contiguous axial images were obtained from the skull base to the   vertex without the use of intravenous contrast. Reformatted coronal and   sagittal images were were subsequently obtained and reviewed.    Axial images were obtained through the cervical spine using multislice   helical technique.  Reformatted coronal and sagittal images were were   subsequently obtained and reviewed.    COMPARISON: CT head and cervical spine 7/10/2022.    FINDINGS:    CT HEAD:    Focal soft tissue laceration in the right forehead near midline without   underlying fracture.    The ventricles and cortical sulci are appropriate for the patient's   stated age.    There is no evidence for intracranial hemorrhage, large territorial   infarct, midline shift, or mass effect.    The calvarium is intact. Visualized paranasal sinuses and mastoids are   well-aerated. The visualized orbits are unremarkable.    CT CERVICAL SPINE:    There is no evidence of acute fracture, compression deformity or facet   subluxation of the cervical spine.    The atlantoaxial relationships are maintained.  The posterior elements   are intact. There is no significant prevertebral soft tissue swelling.      IMPRESSION:    CT HEAD:  Focal soft tissue laceration in the right forehead near midline without   underlying fracture.    No evidence for intracranial hemorrhage, large territorial infarct,   midline shift, or mass effect.    CT CERVICAL SPINE:  No evidence of a cervical spine fracture or subluxation.    --- End of Report ---          DEDRICK DASILVA MD; Resident Radiologist  This document has been electronically signed.  CLARA WALTERS MD; Attending Radiologist  This document has been electronicallysigned. Dec 10 2022  8:44PM (12-10-22 @ 19:53)      12-10-22 @ 22:05

## 2022-12-10 NOTE — ED PROVIDER NOTE - NSFOLLOWUPINSTRUCTIONS_ED_ALL_ED_FT
Laceration Care, Adult    KEEP DRESSING CLEAN AND DRY X 24 HRS THEN REMOVE CLEAN WITH SOAP/WATER, DRY AND APPLY BACITRACIN.  CLEAN EVERY DAY, APPLY BACITRACIN AND COVER  SUTURES MUST BE REMOVED IN 4-5 DAYS;   RETURN HERE OR GO TO YOUR DOCTOR;    ImageA laceration is a cut that goes through all of the layers of the skin and into the tissue that is right under the skin. Some lacerations heal on their own. Others need to be closed with stitches (sutures), staples, skin adhesive strips, or skin glue. Proper laceration care minimizes the risk of infection and helps the laceration to heal better.    How is this treated?  If sutures or staples were used:     Keep the wound clean and dry.  If you were given a bandage (dressing), you should change it at least one time per day or as told by your health care provider. You should also change it if it becomes wet or dirty.  Keep the wound completely dry for the first 24 hours or as told by your health care provider. After that time, you may shower or bathe. However, make sure that the wound is not soaked in water until after the sutures or staples have been removed.  Clean the wound one time each day or as told by your health care provider:    Wash the wound with soap and water.  Rinse the wound with water to remove all soap.  Pat the wound dry with a clean towel. Do not rub the wound.    After cleaning the wound, apply a thin layer of antibiotic ointment as told by your health care provider. This will help to prevent infection and keep the dressing from sticking to the wound.  Have the sutures or staples removed as told by your health care provider.  If skin adhesive strips were used:     Keep the wound clean and dry.  If you were given a bandage (dressing), you should change it at least one time per day or as told by your health care provider. You should also change it if it becomes dirty or wet.  Do not get the skin adhesive strips wet. You may shower or bathe, but be careful to keep the wound dry.  If the wound gets wet, pat it dry with a clean towel. Do not rub the wound.  Skin adhesive strips fall off on their own. You may trim the strips as the wound heals. Do not remove skin adhesive strips that are still stuck to the wound. They will fall off in time.  If skin glue was used:     Try to keep the wound dry, but you may briefly wet it in the shower or bath. Do not soak the wound in water, such as by swimming.  After you have showered or bathed, gently pat the wound dry with a clean towel. Do not rub the wound.  Do not do any activities that will make you sweat heavily until the skin glue has fallen off on its own.  Do not apply liquid, cream, or ointment medicine to the wound while the skin glue is in place. Using those may loosen the film before the wound has healed.  If you were given a bandage (dressing), you should change it at least one time per day or as told by your health care provider. You should also change it if it becomes dirty or wet.  If a dressing is placed over the wound, be careful not to apply tape directly over the skin glue. Doing that may cause the glue to be pulled off before the wound has healed.  Do not pick at the glue. The skin glue usually remains in place for 5–10 days, then it falls off of the skin.  General Instructions     Take over-the-counter and prescription medicines only as told by your health care provider.  If you were prescribed an antibiotic medicine or ointment, take or apply it as told by your doctor. Do not stop using it even if your condition improves.  To help prevent scarring, make sure to cover your wound with sunscreen whenever you are outside after stitches are removed, after adhesive strips are removed, or when glue remains in place and the wound is healed. Make sure to wear a sunscreen of at least 30 SPF.  Do not scratch or pick at the wound.  Keep all follow-up visits as told by your health care provider. This is important.  Check your wound every day for signs of infection. Watch for:    Redness, swelling, or pain.  Fluid, blood, or pus.    Raise (elevate) the injured area above the level of your heart while you are sitting or lying down, if possible.  Contact a health care provider if:  You received a tetanus shot and you have swelling, severe pain, redness, or bleeding at the injection site.  You have a fever.  A wound that was closed breaks open.  You notice a bad smell coming from your wound or your dressing.  You notice something coming out of the wound, such as wood or glass.  Your pain is not controlled with medicine.  You have increased redness, swelling, or pain at the site of your wound.  You have fluid, blood, or pus coming from your wound.  You notice a change in the color of your skin near your wound.  You need to change the dressing frequently due to fluid, blood, or pus draining from the wound.  You develop a new rash.  You develop numbness around the wound.  Get help right away if:  You develop severe swelling around the wound.  Your pain suddenly increases and is severe.  You develop painful lumps near the wound or on skin that is anywhere on your body.  You have a red streak going away from your wound.  The wound is on your hand or foot and you cannot properly move a finger or toe.  The wound is on your hand or foot and you notice that your fingers or toes look pale or bluish.  This information is not intended to replace advice given to you by your health care provider. Make sure you discuss any questions you have with your health care provider.        Kindly take all antiseizure medications as prescribed. It is very important that you never miss a dose to prevent you from having a breakthrough seizure. Be sure to look out for warnings or signs of seizure such as tongue biting, incontinence, shaking, eye rolling/ shaking, generalized shaking, twitching, and acute confusion or amnesia. If these things happen, please alert a healthcare professional immediately and/or come to the ER.

## 2022-12-10 NOTE — ED PROVIDER NOTE - CARE PROVIDER_API CALL
Shellie Braden)  Internal Medicine  [address] [place] [address], [address] [place] [address]  Phone: [phone]  Fax: [phone]  Established Patient  Follow Up Time: 1-3 Days

## 2022-12-10 NOTE — ED ADULT NURSE NOTE - NSIMPLEMENTINTERV_GEN_ALL_ED
Implemented All Fall Risk Interventions:  [place] to call system. Call bell, personal items and telephone within reach. Instruct patient to call for assistance. Room bathroom lighting operational. Non-slip footwear when patient is off stretcher. Physically safe environment: no spills, clutter or unnecessary equipment. Stretcher in lowest position, wheels locked, appropriate side rails in place. Provide visual cue, wrist band, yellow gown, etc. Monitor gait and stability. Monitor for mental status changes and reorient to person, place, and time. Review medications for side effects contributing to fall risk. Reinforce activity limits and safety measures with patient and family.

## 2022-12-15 ENCOUNTER — EMERGENCY (EMERGENCY)
Facility: HOSPITAL | Age: 37
LOS: 0 days | Discharge: HOME | End: 2022-12-15
Attending: EMERGENCY MEDICINE | Admitting: EMERGENCY MEDICINE

## 2022-12-15 VITALS
SYSTOLIC BLOOD PRESSURE: 118 MMHG | DIASTOLIC BLOOD PRESSURE: 70 MMHG | HEART RATE: 84 BPM | OXYGEN SATURATION: 99 % | RESPIRATION RATE: 18 BRPM | TEMPERATURE: 98 F

## 2022-12-15 PROCEDURE — L9995: CPT

## 2022-12-15 NOTE — ED PROVIDER NOTE - PATIENT PORTAL LINK FT
You can access the FollowMyHealth Patient Portal offered by [facility] by registering at the following website: [email or web address]. By joining [employer]’s FollowMyHealth portal, you will also be able to view your health information using other applications (apps) compatible with our system.

## 2022-12-15 NOTE — ED PROVIDER NOTE - IV ALTEPLASE ADMIN OUTSIDE HIDDEN
Spoke to pt to notify her that Dr. Aburto would like to schedule her surgery for 3/28 with PAT on 3/25. Told pt to stop eliquis tomorrow 3/23. Pt verbalized understanding and agreed with plan of care. Message sent to scheduling.   
show

## 2022-12-15 NOTE — ED PROVIDER NOTE - ATTENDING APP SHARED VISIT CONTRIBUTION OF CARE
38 y/o male s/p forehead suture repair after sz, presents for removal, vss, no signs / sx infection.

## 2022-12-15 NOTE — ED PROVIDER NOTE - NS ED ROS FT
Constitutional:  No fever, chills, lethargy, or abnormal weight loss  Eyes:  No eye pain or visual changes  ENMT: No nasal discharge, no toothache, no sore throat. No neck pain or stiffness  Cardiac:  No chest pain or palpitations  Respiratory:  No cough or respiratory distress.   GI:  No nausea, vomiting, diarrhea or abdominal pain.  :  No dysuria, frequency or burning.  MS:  No back or joint pain.  Neuro:  No headache. No numbness, weakness, or tingling.   Skin:  see HPI  Except as documented in the HPI,  all other systems are negative

## 2022-12-15 NOTE — ED ADULT NURSE NOTE - NSIMPLEMENTINTERV_GEN_ALL_ED
HPV shot order placed. Thanks.     Dr. Estrellita Pisano 6/7/2017 9:15 AM         Implemented All Universal Safety Interventions:  [place] to call system. Call bell, personal items and telephone within reach. Instruct patient to call for assistance. Room bathroom lighting operational. Non-slip footwear when patient is off stretcher. Physically safe environment: no spills, clutter or unnecessary equipment. Stretcher in lowest position, wheels locked, appropriate side rails in place.

## 2022-12-15 NOTE — ED PROVIDER NOTE - PHYSICAL EXAMINATION
Gen: Alert, NAD, well appearing  Head: NC, AT, EOMI  Skin: vertical, linear 3 inch scar on pt forehead, well approximated  Neuro: AAOx3, no sensory/motor deficits, CN 2-12 grossly intact

## 2022-12-15 NOTE — ED PROVIDER NOTE - OBJECTIVE STATEMENT
37-year-old male past medical history seizures coming in for suture removal.  Patient was here 5 days ago for placement of sutures after suffering a seizure episode where he fell down and hit his head.  Denies any fluctuance, fever, drainage, or any other symptoms.

## 2022-12-15 NOTE — ED ADULT NURSE NOTE - DRUG PRE-SCREENING (DAST -1)
Patient states she takes 3 25mg metoprolol tablets twice a day. Please advise refill. Statement Selected
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
## 2022-12-17 DIAGNOSIS — S01.81XD LACERATION WITHOUT FOREIGN BODY OF OTHER PART OF HEAD, SUBSEQUENT ENCOUNTER: ICD-10-CM

## 2023-02-27 ENCOUNTER — EMERGENCY (EMERGENCY)
Facility: HOSPITAL | Age: 38
LOS: 0 days | Discharge: HOME | End: 2023-02-27
Attending: EMERGENCY MEDICINE
Payer: MEDICAID

## 2023-02-27 VITALS
OXYGEN SATURATION: 98 % | HEART RATE: 56 BPM | DIASTOLIC BLOOD PRESSURE: 54 MMHG | RESPIRATION RATE: 18 BRPM | TEMPERATURE: 97 F | SYSTOLIC BLOOD PRESSURE: 93 MMHG

## 2023-02-27 VITALS
HEART RATE: 94 BPM | DIASTOLIC BLOOD PRESSURE: 71 MMHG | WEIGHT: 149.91 LBS | OXYGEN SATURATION: 100 % | SYSTOLIC BLOOD PRESSURE: 131 MMHG | RESPIRATION RATE: 19 BRPM | HEIGHT: 68 IN | TEMPERATURE: 97 F

## 2023-02-27 DIAGNOSIS — G40.909 EPILEPSY, UNSPECIFIED, NOT INTRACTABLE, WITHOUT STATUS EPILEPTICUS: ICD-10-CM

## 2023-02-27 DIAGNOSIS — F25.9 SCHIZOAFFECTIVE DISORDER, UNSPECIFIED: ICD-10-CM

## 2023-02-27 LAB
ALBUMIN SERPL ELPH-MCNC: 4.3 G/DL — SIGNIFICANT CHANGE UP (ref 3.5–5.2)
ALP SERPL-CCNC: 24 U/L — LOW (ref 30–115)
ALT FLD-CCNC: 9 U/L — SIGNIFICANT CHANGE UP (ref 0–41)
ANION GAP SERPL CALC-SCNC: 11 MMOL/L — SIGNIFICANT CHANGE UP (ref 7–14)
AST SERPL-CCNC: 13 U/L — SIGNIFICANT CHANGE UP (ref 0–41)
BASOPHILS # BLD AUTO: 0.02 K/UL — SIGNIFICANT CHANGE UP (ref 0–0.2)
BASOPHILS NFR BLD AUTO: 0.3 % — SIGNIFICANT CHANGE UP (ref 0–1)
BILIRUB SERPL-MCNC: 0.3 MG/DL — SIGNIFICANT CHANGE UP (ref 0.2–1.2)
BUN SERPL-MCNC: 13 MG/DL — SIGNIFICANT CHANGE UP (ref 10–20)
CALCIUM SERPL-MCNC: 10 MG/DL — SIGNIFICANT CHANGE UP (ref 8.4–10.5)
CHLORIDE SERPL-SCNC: 104 MMOL/L — SIGNIFICANT CHANGE UP (ref 98–110)
CO2 SERPL-SCNC: 23 MMOL/L — SIGNIFICANT CHANGE UP (ref 17–32)
CREAT SERPL-MCNC: 1.1 MG/DL — SIGNIFICANT CHANGE UP (ref 0.7–1.5)
EGFR: 88 ML/MIN/1.73M2 — SIGNIFICANT CHANGE UP
EOSINOPHIL # BLD AUTO: 0.06 K/UL — SIGNIFICANT CHANGE UP (ref 0–0.7)
EOSINOPHIL NFR BLD AUTO: 0.8 % — SIGNIFICANT CHANGE UP (ref 0–8)
GLUCOSE SERPL-MCNC: 81 MG/DL — SIGNIFICANT CHANGE UP (ref 70–99)
HCT VFR BLD CALC: 41.9 % — LOW (ref 42–52)
HGB BLD-MCNC: 14.3 G/DL — SIGNIFICANT CHANGE UP (ref 14–18)
IMM GRANULOCYTES NFR BLD AUTO: 0.3 % — SIGNIFICANT CHANGE UP (ref 0.1–0.3)
LITHIUM SERPL-MCNC: 0.75 MMOL/L — SIGNIFICANT CHANGE UP (ref 0.6–1.2)
LYMPHOCYTES # BLD AUTO: 1.35 K/UL — SIGNIFICANT CHANGE UP (ref 1.2–3.4)
LYMPHOCYTES # BLD AUTO: 17.6 % — LOW (ref 20.5–51.1)
MAGNESIUM SERPL-MCNC: 1.8 MG/DL — SIGNIFICANT CHANGE UP (ref 1.8–2.4)
MCHC RBC-ENTMCNC: 31.6 PG — HIGH (ref 27–31)
MCHC RBC-ENTMCNC: 34.1 G/DL — SIGNIFICANT CHANGE UP (ref 32–37)
MCV RBC AUTO: 92.7 FL — SIGNIFICANT CHANGE UP (ref 80–94)
MONOCYTES # BLD AUTO: 0.6 K/UL — SIGNIFICANT CHANGE UP (ref 0.1–0.6)
MONOCYTES NFR BLD AUTO: 7.8 % — SIGNIFICANT CHANGE UP (ref 1.7–9.3)
NEUTROPHILS # BLD AUTO: 5.62 K/UL — SIGNIFICANT CHANGE UP (ref 1.4–6.5)
NEUTROPHILS NFR BLD AUTO: 73.2 % — SIGNIFICANT CHANGE UP (ref 42.2–75.2)
NRBC # BLD: 0 /100 WBCS — SIGNIFICANT CHANGE UP (ref 0–0)
PLATELET # BLD AUTO: 197 K/UL — SIGNIFICANT CHANGE UP (ref 130–400)
POTASSIUM SERPL-MCNC: 4.2 MMOL/L — SIGNIFICANT CHANGE UP (ref 3.5–5)
POTASSIUM SERPL-SCNC: 4.2 MMOL/L — SIGNIFICANT CHANGE UP (ref 3.5–5)
PROT SERPL-MCNC: 6.6 G/DL — SIGNIFICANT CHANGE UP (ref 6–8)
RBC # BLD: 4.52 M/UL — LOW (ref 4.7–6.1)
RBC # FLD: 11.8 % — SIGNIFICANT CHANGE UP (ref 11.5–14.5)
SODIUM SERPL-SCNC: 138 MMOL/L — SIGNIFICANT CHANGE UP (ref 135–146)
WBC # BLD: 7.67 K/UL — SIGNIFICANT CHANGE UP (ref 4.8–10.8)
WBC # FLD AUTO: 7.67 K/UL — SIGNIFICANT CHANGE UP (ref 4.8–10.8)

## 2023-02-27 PROCEDURE — 80177 DRUG SCRN QUAN LEVETIRACETAM: CPT

## 2023-02-27 PROCEDURE — 99053 MED SERV 10PM-8AM 24 HR FAC: CPT

## 2023-02-27 PROCEDURE — 83735 ASSAY OF MAGNESIUM: CPT

## 2023-02-27 PROCEDURE — 99284 EMERGENCY DEPT VISIT MOD MDM: CPT

## 2023-02-27 PROCEDURE — 85025 COMPLETE CBC W/AUTO DIFF WBC: CPT

## 2023-02-27 PROCEDURE — 70450 CT HEAD/BRAIN W/O DYE: CPT | Mod: 26,MA

## 2023-02-27 PROCEDURE — 36415 COLL VENOUS BLD VENIPUNCTURE: CPT

## 2023-02-27 PROCEDURE — 99284 EMERGENCY DEPT VISIT MOD MDM: CPT | Mod: 25

## 2023-02-27 PROCEDURE — 70450 CT HEAD/BRAIN W/O DYE: CPT | Mod: MA

## 2023-02-27 PROCEDURE — 80053 COMPREHEN METABOLIC PANEL: CPT

## 2023-02-27 PROCEDURE — 80178 ASSAY OF LITHIUM: CPT

## 2023-02-27 RX ORDER — LEVETIRACETAM 250 MG/1
1000 TABLET, FILM COATED ORAL ONCE
Refills: 0 | Status: COMPLETED | OUTPATIENT
Start: 2023-02-27 | End: 2023-02-27

## 2023-02-27 RX ADMIN — LEVETIRACETAM 400 MILLIGRAM(S): 250 TABLET, FILM COATED ORAL at 03:13

## 2023-02-27 NOTE — ED PROVIDER NOTE - PATIENT PORTAL LINK FT
You can access the FollowMyHealth Patient Portal offered by [facility] by registering at the following website: [email or web address]. By joining [employer]’s FollowMyHealth portal, you will also be able to view your health information using other applications (apps) compatible with our system.

## 2023-02-27 NOTE — ED PROVIDER NOTE - OBJECTIVE STATEMENT
38 years old male history of seizure disorder on Keppra, schizoaffective sent from [facility] for possible seizure.  As per paperwork, patient was found on the floor in the kitchen.  Patient does not recall seizure episode tonight.  Denies postictal, headache, incontinence, tongue biting after the episode.  Further denies normal head injury.  Denies drug or alcohol use tonight.  Denies fever, chills, recent illness, chest pain, abdominal pain, vomiting or diarrhea.

## 2023-02-27 NOTE — ED PROVIDER NOTE - PHYSICAL EXAMINATION
CONSTITUTIONAL: Well-appearing; well-nourished; in no apparent distress.   EYES: PERRL; EOM intact.   CARDIOVASCULAR: Normal S1, S2; no murmurs, rubs, or gallops.   RESPIRATORY: Normal chest excursion with respiration; breath sounds clear and equal bilaterally; no wheezes, rhonchi, or rales.  GI/: Normal bowel sounds; non-distended; non-tender; no palpable organomegaly.   MS: No calf swelling and tenderness.  SKIN: Normal for age and race; warm; dry; good turgor; no apparent lesions or exudate.   NEURO/PSYCH: A & O x 4; CN II-XII grossly unremarkable. no drifting. strength equal to b/l upper and lower extremities. speaking coherently. nml cerebellum test. ambulate with nml and steady gait

## 2023-02-27 NOTE — ED ADULT NURSE NOTE - NSIMPLEMENTINTERV_GEN_ALL_ED
Implemented All Fall with Harm Risk Interventions:  [place] to call system. Call bell, personal items and telephone within reach. Instruct patient to call for assistance. Room bathroom lighting operational. Non-slip footwear when patient is off stretcher. Physically safe environment: no spills, clutter or unnecessary equipment. Stretcher in lowest position, wheels locked, appropriate side rails in place. Provide visual cue, wrist band, yellow gown, etc. Monitor gait and stability. Monitor for mental status changes and reorient to person, place, and time. Review medications for side effects contributing to fall risk. Reinforce activity limits and safety measures with patient and family. Provide visual clues: red socks.

## 2023-02-27 NOTE — ED ADULT NURSE NOTE - OBJECTIVE STATEMENT
BIBA with complaints of having seizure, found on kitchen floor, sent from [address] [place] [address] 1

## 2023-02-27 NOTE — ED PROVIDER NOTE - NSFOLLOWUPCLINICS_GEN_ALL_ED_FT
[facility]  Neurology  [address] [place] [address], [address]  [place] [address]  Phone: [phone]  Fax:

## 2023-02-27 NOTE — ED PROVIDER NOTE - ATTENDING APP SHARED VISIT CONTRIBUTION OF CARE
Patient is sent from [facility] for evaluation of seizure. Patient with h/o seizures and is on Keppra. Patient denies any symptoms. Patient states that, he feels fine and wanted to go back to [facility].   Vitals reviewed.   No external signs of trauma  noted.   No tenderness on the palpation of the head.   Neck: No midline vertebral column tenderness, no swelling, no ecchymosis, no crepitus, full ROM of the neck noted, no para spinal tenderness, no pain on ROM of the neck.  Lungs: CTA, no wheezing, no crackles.  Heart: s1, s2, rrr, no M/G.  Abd: +BS, NT, ND, soft, no rebound, no guarding. No CVA tenderness, no rash.  CNS: Sleeping comfortably, wakes up to loud verbal stimuli, follows simple commands.   No focal neurologic deficits.   A/P: S/P Seizure, found on the kitchen,   labs, CT, Keppra, reevaluation.

## 2023-02-27 NOTE — ED PROVIDER NOTE - CLINICAL SUMMARY MEDICAL DECISION MAKING FREE TEXT BOX
Endorsed from Dr. Rachel  39 yo man w/ hx of sz d/o here w/ sz and head trauma  pt w/ no complaints  c-spine cleared clinically  neuro intact    NCHCT negative  Pending Li level  Loaded w/ keppra

## 2023-02-27 NOTE — ED ADULT TRIAGE NOTE - CHIEF COMPLAINT QUOTE
VIKASH with complaints of having seizure, found on kitchen floor, sent from [facility]  [address] BIBA with complaints of having seizure, found on kitchen floor, sent from [address] [place] [address] 1

## 2023-02-27 NOTE — ED PROVIDER NOTE - NSDCPRINTRESULTS_ED_ALL_ED
Patient requests all Lab, Cardiology, and Radiology Results on their Discharge Instructions Terbinafine Counseling: Patient counseling regarding adverse effects of terbinafine including but not limited to headache, diarrhea, rash, upset stomach, liver function test abnormalities, itching, taste/smell disturbance, nausea, abdominal pain, and flatulence.  There is a rare possibility of liver failure that can occur when taking terbinafine.  The patient understands that a baseline LFT and kidney function test may be required. The patient verbalized understanding of the proper use and possible adverse effects of terbinafine.  All of the patient's questions and concerns were addressed.

## 2023-02-27 NOTE — ED ADULT NURSE NOTE - CHIEF COMPLAINT QUOTE
BIBA with complaints of having seizure, found on kitchen floor, sent from [address] [place] [address] 1

## 2023-03-01 LAB — LEVETIRACETAM SERPL-MCNC: 37.8 UG/ML — SIGNIFICANT CHANGE UP (ref 10–40)

## 2023-03-14 NOTE — ED PROVIDER NOTE - WET READ LAUNCH FT
Patient transported with a Registered Nurse. Patient transported to: PACU.    Spoke to Joseph in PACU to advise pt on the way to recovery and bed coordinator already called There are no Wet Read(s) to document.

## 2023-03-16 NOTE — DISCHARGE NOTE PROVIDER - REASON FOR ADMISSION
"Lawanda Dykes is a 52 y.o. female here today for follow-up of a left ovarian cyst.  This was originally noted on a CT scan in January.  Concurrent pelvic ultrasound showed a 3 cm cystic lesion of the left ovary.  She has not had a menstrual period since May 2022.  She is on anticoagulation with Plavix due to cardiac history.  She denies lower abdominal pain or vaginal bleeding.  She is having some vasomotor symptoms including night sweats.    Visit Vitals  /82 (BP Location: Left arm, Patient Position: Sitting)   Ht 165.1 cm (65\")   Wt 78.5 kg (173 lb)   BMI 28.79 kg/m²     Pleasant female no acute distress  Mood and affect normal  Breathing unlabored    A pelvic ultrasound was ordered and performed in the office today.  The 3 cm left ovarian cyst is stable in size and appearance.  It has the characteristic appearance of an endometrioma.    Assessment: Left ovarian cyst, night sweats    The cyst appears stable and likely represents endometrioma.  She is otherwise asymptomatic and going through menopause.  I recommend conservative management with follow-up imaging in 6 months.  In the meantime she can try black cohosh for vasomotor symptoms, as the use of hormone replacement is contraindicated.  Call with questions or concerns.    " V-EEG

## 2023-03-23 ENCOUNTER — EMERGENCY (EMERGENCY)
Facility: HOSPITAL | Age: 38
LOS: 0 days | Discharge: HOME | End: 2023-03-23
Attending: EMERGENCY MEDICINE
Payer: MEDICAID

## 2023-03-23 VITALS
OXYGEN SATURATION: 97 % | RESPIRATION RATE: 16 BRPM | HEART RATE: 55 BPM | DIASTOLIC BLOOD PRESSURE: 81 MMHG | SYSTOLIC BLOOD PRESSURE: 126 MMHG | TEMPERATURE: 99 F | HEIGHT: 68 IN

## 2023-03-23 DIAGNOSIS — H00.014 HORDEOLUM EXTERNUM LEFT UPPER EYELID: ICD-10-CM

## 2023-03-23 DIAGNOSIS — F20.9 SCHIZOPHRENIA, UNSPECIFIED: ICD-10-CM

## 2023-03-23 DIAGNOSIS — H57.89 OTHER SPECIFIED DISORDERS OF EYE AND ADNEXA: ICD-10-CM

## 2023-03-23 DIAGNOSIS — G40.909 EPILEPSY, UNSPECIFIED, NOT INTRACTABLE, WITHOUT STATUS EPILEPTICUS: ICD-10-CM

## 2023-03-23 PROBLEM — F31.9 BIPOLAR DISORDER, UNSPECIFIED: Chronic | Status: ACTIVE | Noted: 2023-02-27

## 2023-03-23 PROBLEM — F25.9 SCHIZOAFFECTIVE DISORDER, UNSPECIFIED: Chronic | Status: ACTIVE | Noted: 2023-02-27

## 2023-03-23 PROBLEM — R56.9 UNSPECIFIED CONVULSIONS: Chronic | Status: ACTIVE | Noted: 2023-02-27

## 2023-03-23 PROCEDURE — 99283 EMERGENCY DEPT VISIT LOW MDM: CPT

## 2023-03-23 PROCEDURE — 99284 EMERGENCY DEPT VISIT MOD MDM: CPT

## 2023-03-23 RX ORDER — POLYMYXIN B SULF/TRIMETHOPRIM 10000-1/ML
1 DROPS OPHTHALMIC (EYE) ONCE
Refills: 0 | Status: COMPLETED | OUTPATIENT
Start: 2023-03-23 | End: 2023-03-23

## 2023-03-23 RX ADMIN — Medication 1 DROP(S): at 13:14

## 2023-03-23 NOTE — ED PROVIDER NOTE - OBJECTIVE STATEMENT
Pt with SZ d/o presents with left eye redness and swelling and itching. No DC or change in vision. No contact lens use.

## 2023-03-23 NOTE — ED PROVIDER NOTE - ATTENDING APP SHARED VISIT CONTRIBUTION OF CARE
38-year-old male history of seizure disorder, schizophrenia, hep C presents with left eyelid pain, redness, itchy discomfort, tearing the past several days, denies modifying factors, denies fever, transient or persistent visual disturbances, floaters, flashers, light-sensitivity, discharge, swelling, headache, scalp tenderness or other associated complaints at present. Old chart reviewed. I have reviewed and agree with the initial nursing note, except as documented in my note.    VSS, awake, alert, VA – grossly normal, left lower eyelid stye, otherwise LLL normal, no fb noted with eversion of lids, no periorbital swelling, erythema, ecchymosis, bony deformities or tenderness, no rash or lesions noted to face or nose, no ocular d/c, SC anicteric and pink, PERRL, EOMI w/o significant pain, no proptosis or chemosis, no hyphema or hypopyon, no lesions, clear speech and steady gait.

## 2023-03-23 NOTE — ED PROVIDER NOTE - PHYSICAL EXAMINATION
left eye with swelling to upper lid with tenderness medially. no cellulitis. EOMI w/o pain. PERRL, No proptosis. Conjunctiva not injected VA 20/20 OS

## 2023-03-23 NOTE — ED PROVIDER NOTE - NSFOLLOWUPINSTRUCTIONS_ED_ALL_ED_FT
Sty:  Apply Polytrim eye drops to left eye. 2 drops to left eye every 6 hours x 7 days  Apply warm compresses to eye

## 2023-03-23 NOTE — ED PROVIDER NOTE - CLINICAL SUMMARY MEDICAL DECISION MAKING FREE TEXT BOX
38-year-old patient presenting with swollen eyelid. Otherwise well-appearing.No history of trauma. No urticarial rash to suggest allergic reaction. No airway swelling, wheezing, vomiting/diarrhea, or tachycardia/hypotension to suggest anaphylaxis. No proptosis, vision change, or pain with EOM to suggest orbital cellulitis. Ddx includes allergic reaction vs. stye. Will treat empirically with antibiotics and antihistamines. Discussed need for outpatient follow-up and return precautions for signs/symptoms of orbital cellulitis or anaphylaxis. Return precautions discussed.

## 2023-03-23 NOTE — ED PROVIDER NOTE - PATIENT PORTAL LINK FT
You can access the FollowMyHealth Patient Portal offered by [facility] by registering at the following website: [email or web address]. By joining [employer]’s FollowMyHealth portal, you will also be able to view your health information using other applications (apps) compatible with our system.

## 2023-03-23 NOTE — ED PROVIDER NOTE - NSFOLLOWUPCLINICS_GEN_ALL_ED_FT
[facility]  Ophthalmolgy  [address], [address]  [place] [address]  Phone: [phone]  Fax:   Follow Up Time: 4-6 Days

## 2023-04-14 NOTE — ED ADULT TRIAGE NOTE - BEFAST SCREENING
Outreach attempt was made to schedule a Medicare Wellness Visit. This was the second attempt. Contact was not made, left message.       Pt due for MWV and missed follow up on 3/30/23    Negative

## 2023-09-19 NOTE — ED ADULT NURSE NOTE - NS ED NURSE LEVEL OF CONSCIOUSNESS SPEECH
Detail Level: Detailed Size Of Lesion: 7 x 4 mm Instructions (Optional): Stable from prior examination and measurement Size Of Lesion: 10 x 6 mm Size Of Lesion: 10 x 7 mm Instructions (Optional): Stable from prior examination and measurement. Speaking Coherently Size Of Lesion: 4 x 5 mm Body Location Override (Optional - Billing Will Still Be Based On Selected Body Map Location If Applicable): left lateral neck Size Of Lesion: 4 x 3 mm

## 2023-11-13 NOTE — ED ADULT NURSE NOTE - NS ED NURSE IV DC DT
Met with patient to discuss d/c planning. He did confirm that although he is doing better he knows what he can and can't do. He wants to d/c to [facility]. Caleb Dyer is following ([phone]). She is going to check midweek per Dr Rogelio Flynn to determine d/c date and medical stability. She states normally precerts for this insurance are within a day. The Auth would be eligible for 72 hours. If d/c would be end of next week, she would initiate on Wednesday 11/15.     NUHA Pablo 10-Dec-2022 23:52

## 2023-12-26 NOTE — ED PROVIDER NOTE - NSTIMEPROVIDERCAREINITIATE_GEN_ER
Pt reports she has been having worsening left ankle/leg pain and swelling. Pt has has 2 recent tendon surgery's in left leg. Last surgery was in march. No known recent injury per patient. Pt with history of factor 5   27-Apr-2022 19:08

## 2024-01-24 NOTE — ED ADULT TRIAGE NOTE - MEANS OF ARRIVAL
Care Management Discharge Note    Discharge Date: 01/24/2024     Discharge Disposition: Home    Discharge Services: Home Care RN/PT    Discharge Transportation: Wife    Private pay costs discussed: Not applicable    Does the patient's insurance plan have a 3 day qualifying hospital stay waiver?  No    Patient/Family in Agreement with the Plan:  Yes    Handoff Referral Completed: No    Additional Information:  Plan for home with [facility] RN/PT. Discharge orders faxed. [facility] to complete info session at home. Wife to transport. SW will remain available for any further needs.     MILLI Lieberman, [facility]   Inpatient Care Coordination  [facility]   [phone]         stretcher

## 2024-03-13 NOTE — ED ADULT NURSE NOTE - NS ED PATIENT SAFETY CONCERN
PATIENT INSTRUCTIONS    Treatment:  PT Location  Physical Therapy     [facility] [place]   [address]  [address] [address]  [id number] [address] [facility]  [id number] [address] [facility]  [address] [address] [place]  [id number]          [phone]            Occupational Therapy (Hand/Upper extremity therapy) is offered at the [facility]  Please dress according to treatment area (ex: knee treatment needs to wear shorts)  Co-payments are due at the time of appointment  To ensure your visit goes as smooth as possible, please check your insurance benefits for coverage of physical therapy and bring a copy of your insurance card.    Ice Massage: Freeze water in [place] cups, then peel paper away, leaving some at the bottom for you to hold the ice, massage into the injured area for 8-10 minutes or until the skin turns and stays white. Do this 2 times a day.    Home Exercise Program: Complete daily, use tennis ball to roll under foot     Medications:   Ibuprofen: Take 600mg of ibuprofen with food two to three times a day as needed for pain. Avoid taking other anti-inflammatory medications.     Extra Strength Tylenol: Take two capsules up to three times a day as needed for pain. Do not exceed 3000 mg in one day       Follow-Up:  Please make an appointment with  Dr. Rito Harden in 6 weeks        Time left: 3/13/2024 2:18 PM     Please note: 24 hour notice for cancellation of appointment is required.    You may receive a survey in the mail, or via the e-mail address that you have provided.  We would appreciate if you could fill out the survey and provide us with any feedback on your experience regarding your visit today. Thank you for allowing us to provide you with your health care needs.     Do not hesitate to call if you are experiencing severe pain, worsening or change in your pain, have symptoms of  infection (fever, warmth, redness, increased drainage), or have any other problem that concerns you ~ [phone] (or [phone] after hours).    Please remember when requesting refills on pain medication that the request should be made by Thursday at the latest. [facility] is open Monday-Friday, 8am-5pm, and closed on the weekends.  No narcotic refills will be filled after hours.    Additional Educational Resources:  For additional resources regarding your symptoms, diagnosis, or further health information, please visit the Health Resources section on [email or web address] or the Online Health Resources section in I Do Now I Don'tt.        Unable to assess due to medical condition

## 2024-11-07 NOTE — ED ADULT NURSE NOTE - NSFALLRSKINDICATORS_ED_ALL_ED
JOHN:    Pt's phone was found (with linens)    Pt accepted for IV antibiotic infusions at home - [facility]    Education anticipated today at 14:15, with wife present    HH for SN (line care) and PT - in Careport following discussion of HH choice. [facility] accepted for SN and PT - SOC 11/8    Wife will transport    CM reviewed 2nd IMM letter with Pt and provided copy    Transition of Care Plan:    RUR: 10%  Prior Level of Functioning: home indep  Disposition: home with IV ABX  PARISH: 11/7  If SNF or IPR: Date FOC offered:   Date FOC received:   Accepting facility:   Date authorization started with reference number:   Date authorization received and expires:   Follow up appointments: [facility]   DME needed: none  Transportation at discharge: wife  IM/IMM Medicare/[facility] letter given: y  Is patient a [place] and connected with [facility]?    If yes, was  transfer form completed and [facility] notified?   Caregiver Contact: wife Alexandra You   [phone]   Discharge Caregiver contacted prior to discharge? y  Care Conference needed?   Barriers to discharge:  IV ABX edu, then go   yes

## 2024-11-21 NOTE — ED PROVIDER NOTE - NS ED ATTENDING STATEMENT MOD
I have personally seen and examined this patient.  I have fully participated in the care of this patient. I have reviewed all pertinent clinical information, including history, physical exam, plan and the Resident’s note and agree except as noted. Opt out

## 2025-01-13 NOTE — ED ADULT TRIAGE NOTE - PATIENT ON (OXYGEN DELIVERY METHOD)
Is the patient on isolation?: No   Do you expect the patient to require telemetry (informational-only for bed management): Yes   Do you expect the patient to require observation or inpt? (informational-only for bed management): Observation   Bed Type: Telemetry [3]   room air

## 2025-03-24 NOTE — ED PROVIDER NOTE - CARE PROVIDERS DIRECT ADDRESSES
No ,[email or web address]

## 2025-05-14 NOTE — PATIENT PROFILE ADULT - NSPROMUTANXFEARADDRESSFT_GEN_A_NUR
Copied from CRM #[id number]. Topic: MW Messaging - MW Patient Request  >> May 14, 2025  8:54 AM Isi ZHAO wrote:  --DO NOT REPLY - Sent from PACT - If sent to wrong pool, reroute to P ECO Reroute pool --    Request:  Change of hours at work, patient requesting to work no more than 10 hours a day. Employer needs letter from provider about change. Patient currently works 12 hours and would like to decrease hours due to back and abdominal pain.     Provider: Nikhil Baptiste MD, [facility]   Preferred Delivery Method: Input in Epic - Call when ready//Pay-Me portal  Callback #: [phone]  Can a detailed message be left? Yes - Voicemail   Caller has been advised this message will be addressed within:2-3 business days [routine]     none

## 2025-06-12 NOTE — PATIENT PROFILE ADULT - NSPROMEDSHERBAL_GEN_A_NUR
Additional Notes: Pt states she had 5 bumps to appear and she went to urgent care and they gave her doxycycline. She did a virtual visit with a friend that works at a dermatologist office and they said that it was cat scratch fever! Rash appears to be clear today and patient have three days left of doxycycline. Render Risk Assessment In Note?: no Detail Level: Simple no